# Patient Record
Sex: FEMALE | Race: WHITE | NOT HISPANIC OR LATINO | Employment: FULL TIME | URBAN - METROPOLITAN AREA
[De-identification: names, ages, dates, MRNs, and addresses within clinical notes are randomized per-mention and may not be internally consistent; named-entity substitution may affect disease eponyms.]

---

## 2017-06-26 ENCOUNTER — HOSPITAL ENCOUNTER (EMERGENCY)
Facility: HOSPITAL | Age: 47
Discharge: HOME | End: 2017-06-26
Payer: MEDICAID

## 2017-06-26 VITALS
HEART RATE: 105 BPM | HEIGHT: 63 IN | BODY MASS INDEX: 28.88 KG/M2 | DIASTOLIC BLOOD PRESSURE: 83 MMHG | OXYGEN SATURATION: 97 % | TEMPERATURE: 98 F | SYSTOLIC BLOOD PRESSURE: 166 MMHG | RESPIRATION RATE: 20 BRPM | WEIGHT: 163 LBS

## 2017-06-26 DIAGNOSIS — S20.211A CONTUSION OF RIB ON RIGHT SIDE, INITIAL ENCOUNTER: ICD-10-CM

## 2017-06-26 PROCEDURE — 99283 EMERGENCY DEPT VISIT LOW MDM: CPT

## 2017-06-26 PROCEDURE — 25000003 PHARM REV CODE 250: Performed by: PHYSICIAN ASSISTANT

## 2017-06-26 RX ORDER — IBUPROFEN 600 MG/1
600 TABLET ORAL
Status: COMPLETED | OUTPATIENT
Start: 2017-06-26 | End: 2017-06-26

## 2017-06-26 RX ORDER — ORPHENADRINE CITRATE 100 MG/1
100 TABLET, EXTENDED RELEASE ORAL 2 TIMES DAILY
Qty: 12 TABLET | Refills: 0 | Status: SHIPPED | OUTPATIENT
Start: 2017-06-26 | End: 2018-06-14

## 2017-06-26 RX ORDER — MELOXICAM 7.5 MG/1
7.5 TABLET ORAL DAILY
Qty: 10 TABLET | Refills: 0 | Status: SHIPPED | OUTPATIENT
Start: 2017-06-26 | End: 2018-06-14

## 2017-06-26 RX ADMIN — IBUPROFEN 600 MG: 600 TABLET ORAL at 05:06

## 2017-06-26 NOTE — ED PROVIDER NOTES
"   History      Chief Complaint   Patient presents with    Back Pain     Pt states, "Some boxes fell off af a cart and on to my back at [facility] and it took my breath away and my back is hurting."       Review of patient's allergies indicates:   Allergen Reactions    Morphine      Reports "headache"        HPI   HPI    2017, 5:16 PM   History obtained from the patient      History of Present Illness: Yue Serrato is a 47 y.o. female patient who presents to the Emergency Department for right upper back pain since boxes fell on her at [facility]. Symptoms are constant and moderate in severity.  The patient describes the symptoms as achy.  Denies head injury,  bladder/bowel dysfunction, fever, saddle anesthesia, or focal weakness.  No mitigating or exacerbating factors reported.   No further complaints or concerns at this time.           PCP: MARIYA Altamirano       Past Medical History:  Past Medical History:   Diagnosis Date    Anxiety     Depression     Diabetes mellitus type II     Diverticulitis     Hyperlipidemia     Hypertension          Past Surgical History:  Past Surgical History:   Procedure Laterality Date     SECTION      three    HYSTERECTOMY      2005    LIPOMA RESECTION             Family History:  Family History   Problem Relation Age of Onset    Heart disease Mother      CAD    Diabetes Mother     Hypertension Mother     Hyperlipidemia Mother     COPD Mother     COPD Father     Diabetes Father     Hyperlipidemia Father     Hypertension Father     Heart disease Father      MI           Social History:  Social History     Social History Main Topics    Smoking status: Never Smoker    Smokeless tobacco: Never Used    Alcohol use No    Drug use: No    Sexual activity: Not Currently     Partners: Male       ROS   Review of Systems   Constitutional: Negative for chills and fever.   HENT: Negative for sore throat.    Respiratory: Negative for shortness of breath.  "   Cardiovascular: Negative for chest pain.   Gastrointestinal: Negative for nausea and vomiting.   Genitourinary: Negative for decreased urine volume, difficulty urinating, dysuria and flank pain.   Musculoskeletal: Positive for upper back pain/rib pain. Negative for neck stiffness.   Skin: Negative for rash and wound.   Neurological: Negative for weakness and numbness.   Hematological: Does not bruise/bleed easily.   All other systems reviewed and are negative.    Review of Systems    Physical Exam      Initial Vitals [06/26/17 1653]   BP Pulse Resp Temp SpO2   (!) 166/83 105 20 98.1 °F (36.7 °C) 97 %      MAP       110.67         Physical Exam  Vital signs and nursing notes reviewed.  Constitutional: Patient is in NAD. Awake and alert. Well-developed and well-nourished.  Head: Atraumatic. Normocephalic.  Eyes: PERRL. EOM intact. Conjunctivae nl. No scleral icterus.  ENT: Mucous membranes are moist. Oropharynx is clear.  Neck: Supple. No JVD. No lymphadenopathy.  No meningismus.  Nontender  Cardiovascular: Regular rate and rhythm. No murmurs, rubs, or gallops. Distal pulses are 2+ and symmetric.  Pulmonary/Chest: No respiratory distress. Clear to auscultation bilaterally. No wheezing, rales, or rhonchi.  Abdominal: Soft. Non-distended. No TTP. No rebound, guarding, or rigidity. Good bowel sounds.  Genitourinary: No CVA tenderness  Musculoskeletal: Moves all extremities. No edema.   Non tender c/t/l/s spine.  Right upper back/ribs tender, no erythema, edema, ecchymosis, step or crep.  Skin: Warm and dry.  Neurological: Awake and alert. No acute focal neurological deficits are appreciated.  5/5 x 4 strength.  Strong and equal plantar and dorsiflexion.  Psychiatric: Normal affect. Good eye contact. Appropriate in content.      ED Course      Procedures  ED Vital Signs:  Vitals:    06/26/17 1653   BP: (!) 166/83   Pulse: 105   Resp: 20   Temp: 98.1 °F (36.7 °C)   TempSrc: Oral   SpO2: 97%   Weight: 73.9 kg (163 lb)  "  Height: 5' 2.5" (1.588 m)                 Imaging Results:  Imaging Results          X-Ray Ribs 2 View Right (Final result)  Result time 06/26/17 18:00:05    Final result by Cristina Garcia III, MD (06/26/17 18:00:05)                 Impression:     No evidence of rib fracture or other acute intrathoracic disease.      Electronically signed by: CRISTINA GARCIA MD  Date:     06/26/17  Time:    18:00              Narrative:    XR RIBS 2 VIEW RIGHT    Clinical history: S20.211A Contusion of right front wall of thorax, initial encounter    Findings: No rib fracture identified. No evidence of pneumothorax, effusion or acute infiltrate. Cardiomediastinal silhouette is within normal limits.                                 The Emergency Provider reviewed the vital signs and test results, which are outlined above.    ED Discussion         Medication(s) given in the ER:  Medications   ibuprofen tablet 600 mg (600 mg Oral Given 6/26/17 1740)           Follow-up Information     MARIYA Altamirano In 2 days.    Specialty:  Family Medicine  Contact information:  [address] [place] [address]  [address] [place] [id number] [phone]                       New Prescriptions    MELOXICAM (MOBIC) 7.5 MG TABLET    Take 1 tablet (7.5 mg total) by mouth once daily.    ORPHENADRINE (NORFLEX) 100 MG TABLET    Take 1 tablet (100 mg total) by mouth 2 (two) times daily.          Medical Decision Making        All findings were reviewed with the patient/family in detail.   All remaining questions and concerns were addressed at that time.  Patient/family has been counseled regarding the need for follow-up as well as the indication to return to the emergency room should new or worrisome developments occur.          MDM               Clinical Impression:        ICD-10-CM ICD-9-CM   1. Contusion of rib on right side, initial encounter S20.211A 922.1             Sybil Elias PA-C  06/26/17 1812    "

## 2017-09-16 ENCOUNTER — HOSPITAL ENCOUNTER (EMERGENCY)
Facility: HOSPITAL | Age: 47
Discharge: HOME | End: 2017-09-16
Attending: EMERGENCY MEDICINE
Payer: MEDICAID

## 2017-09-16 VITALS
HEIGHT: 62 IN | WEIGHT: 162 LBS | OXYGEN SATURATION: 100 % | DIASTOLIC BLOOD PRESSURE: 74 MMHG | RESPIRATION RATE: 16 BRPM | SYSTOLIC BLOOD PRESSURE: 139 MMHG | BODY MASS INDEX: 29.81 KG/M2 | TEMPERATURE: 98 F | HEART RATE: 79 BPM

## 2017-09-16 DIAGNOSIS — R73.9 HYPERGLYCEMIA: Primary | ICD-10-CM

## 2017-09-16 DIAGNOSIS — N39.0 URINARY TRACT INFECTION WITHOUT HEMATURIA, SITE UNSPECIFIED: ICD-10-CM

## 2017-09-16 DIAGNOSIS — E10.65 HYPERGLYCEMIA DUE TO TYPE 1 DIABETES MELLITUS: ICD-10-CM

## 2017-09-16 DIAGNOSIS — R53.1 WEAKNESS: ICD-10-CM

## 2017-09-16 LAB
ALBUMIN SERPL BCP-MCNC: 3.6 G/DL
ALLENS TEST: ABNORMAL
ALP SERPL-CCNC: 106 U/L
ALT SERPL W/O P-5'-P-CCNC: 21 U/L
ANION GAP SERPL CALC-SCNC: 13 MMOL/L
AST SERPL-CCNC: 14 U/L
B-OH-BUTYR BLD STRIP-SCNC: 0 MMOL/L
BACTERIA #/AREA URNS HPF: ABNORMAL /HPF
BASOPHILS # BLD AUTO: 0.02 K/UL
BASOPHILS NFR BLD: 0.2 %
BILIRUB SERPL-MCNC: 0.6 MG/DL
BILIRUB UR QL STRIP: NEGATIVE
BUN SERPL-MCNC: 17 MG/DL
CALCIUM SERPL-MCNC: 9.5 MG/DL
CHLORIDE SERPL-SCNC: 103 MMOL/L
CLARITY UR: CLEAR
CO2 SERPL-SCNC: 20 MMOL/L
COLOR UR: YELLOW
CREAT SERPL-MCNC: 1 MG/DL
DELSYS: ABNORMAL
DIFFERENTIAL METHOD: NORMAL
EOSINOPHIL # BLD AUTO: 0.1 K/UL
EOSINOPHIL NFR BLD: 1.3 %
ERYTHROCYTE [DISTWIDTH] IN BLOOD BY AUTOMATED COUNT: 12.5 %
EST. GFR  (AFRICAN AMERICAN): >60 ML/MIN/1.73 M^2
EST. GFR  (NON AFRICAN AMERICAN): >60 ML/MIN/1.73 M^2
GLUCOSE SERPL-MCNC: 428 MG/DL
GLUCOSE UR QL STRIP: ABNORMAL
HCO3 UR-SCNC: 21.8 MMOL/L (ref 24–28)
HCT VFR BLD AUTO: 39 %
HGB BLD-MCNC: 13.8 G/DL
HGB UR QL STRIP: ABNORMAL
KETONES UR QL STRIP: NEGATIVE
LEUKOCYTE ESTERASE UR QL STRIP: NEGATIVE
LIPASE SERPL-CCNC: 76 U/L
LYMPHOCYTES # BLD AUTO: 2.6 K/UL
LYMPHOCYTES NFR BLD: 27.6 %
MCH RBC QN AUTO: 30.4 PG
MCHC RBC AUTO-ENTMCNC: 35.4 G/DL
MCV RBC AUTO: 86 FL
MICROSCOPIC COMMENT: ABNORMAL
MODE: ABNORMAL
MONOCYTES # BLD AUTO: 0.5 K/UL
MONOCYTES NFR BLD: 5.2 %
NEUTROPHILS # BLD AUTO: 6.1 K/UL
NEUTROPHILS NFR BLD: 65.7 %
NITRITE UR QL STRIP: NEGATIVE
PCO2 BLDA: 34.9 MMHG (ref 35–45)
PH SMN: 7.4 [PH] (ref 7.35–7.45)
PH UR STRIP: 5 [PH] (ref 5–8)
PLATELET # BLD AUTO: 211 K/UL
PMV BLD AUTO: 9.9 FL
PO2 BLDA: 111 MMHG (ref 80–100)
POC BE: -3 MMOL/L
POC SATURATED O2: 98 % (ref 95–100)
POCT GLUCOSE: 247 MG/DL (ref 70–110)
POCT GLUCOSE: 389 MG/DL (ref 70–110)
POTASSIUM SERPL-SCNC: 4 MMOL/L
PROT SERPL-MCNC: 7.5 G/DL
PROT UR QL STRIP: NEGATIVE
RBC # BLD AUTO: 4.54 M/UL
RBC #/AREA URNS HPF: 5 /HPF (ref 0–4)
SAMPLE: ABNORMAL
SITE: ABNORMAL
SODIUM SERPL-SCNC: 136 MMOL/L
SP GR UR STRIP: 1.01 (ref 1–1.03)
TROPONIN I SERPL DL<=0.01 NG/ML-MCNC: 0.01 NG/ML
URN SPEC COLLECT METH UR: ABNORMAL
UROBILINOGEN UR STRIP-ACNC: NEGATIVE EU/DL
WBC # BLD AUTO: 9.24 K/UL
WBC #/AREA URNS HPF: 30 /HPF (ref 0–5)
YEAST URNS QL MICRO: ABNORMAL

## 2017-09-16 PROCEDURE — 99284 EMERGENCY DEPT VISIT MOD MDM: CPT | Mod: 25

## 2017-09-16 PROCEDURE — 99900035 HC TECH TIME PER 15 MIN (STAT)

## 2017-09-16 PROCEDURE — 80053 COMPREHEN METABOLIC PANEL: CPT

## 2017-09-16 PROCEDURE — 83690 ASSAY OF LIPASE: CPT

## 2017-09-16 PROCEDURE — 63600175 PHARM REV CODE 636 W HCPCS: Performed by: EMERGENCY MEDICINE

## 2017-09-16 PROCEDURE — 81000 URINALYSIS NONAUTO W/SCOPE: CPT

## 2017-09-16 PROCEDURE — 36600 WITHDRAWAL OF ARTERIAL BLOOD: CPT

## 2017-09-16 PROCEDURE — 87077 CULTURE AEROBIC IDENTIFY: CPT

## 2017-09-16 PROCEDURE — 82962 GLUCOSE BLOOD TEST: CPT

## 2017-09-16 PROCEDURE — 84484 ASSAY OF TROPONIN QUANT: CPT

## 2017-09-16 PROCEDURE — 82010 KETONE BODYS QUAN: CPT

## 2017-09-16 PROCEDURE — 25000003 PHARM REV CODE 250: Performed by: EMERGENCY MEDICINE

## 2017-09-16 PROCEDURE — 87186 SC STD MICRODIL/AGAR DIL: CPT

## 2017-09-16 PROCEDURE — 96361 HYDRATE IV INFUSION ADD-ON: CPT

## 2017-09-16 PROCEDURE — 93005 ELECTROCARDIOGRAM TRACING: CPT

## 2017-09-16 PROCEDURE — 93010 ELECTROCARDIOGRAM REPORT: CPT | Mod: ,,, | Performed by: INTERNAL MEDICINE

## 2017-09-16 PROCEDURE — 96365 THER/PROPH/DIAG IV INF INIT: CPT

## 2017-09-16 PROCEDURE — 87088 URINE BACTERIA CULTURE: CPT

## 2017-09-16 PROCEDURE — 87086 URINE CULTURE/COLONY COUNT: CPT

## 2017-09-16 PROCEDURE — 85025 COMPLETE CBC W/AUTO DIFF WBC: CPT

## 2017-09-16 RX ORDER — ATORVASTATIN CALCIUM 40 MG/1
40 TABLET, FILM COATED ORAL DAILY
COMMUNITY
End: 2018-06-14

## 2017-09-16 RX ORDER — SODIUM CHLORIDE 9 MG/ML
1000 INJECTION, SOLUTION INTRAVENOUS
Status: COMPLETED | OUTPATIENT
Start: 2017-09-16 | End: 2017-09-16

## 2017-09-16 RX ORDER — ESCITALOPRAM OXALATE 10 MG/1
10 TABLET ORAL DAILY
COMMUNITY
End: 2018-06-14

## 2017-09-16 RX ORDER — ACETAMINOPHEN 500 MG
1000 TABLET ORAL
Status: COMPLETED | OUTPATIENT
Start: 2017-09-16 | End: 2017-09-16

## 2017-09-16 RX ORDER — CIPROFLOXACIN 500 MG/1
500 TABLET ORAL 2 TIMES DAILY
Qty: 14 TABLET | Refills: 0 | Status: SHIPPED | OUTPATIENT
Start: 2017-09-16 | End: 2017-09-23

## 2017-09-16 RX ADMIN — ACETAMINOPHEN 1000 MG: 500 TABLET ORAL at 07:09

## 2017-09-16 RX ADMIN — CEFTRIAXONE 1 G: 1 INJECTION, SOLUTION INTRAVENOUS at 07:09

## 2017-09-16 RX ADMIN — SODIUM CHLORIDE 1000 ML: 0.9 INJECTION, SOLUTION INTRAVENOUS at 07:09

## 2017-09-16 NOTE — ED PROVIDER NOTES
"SCRIBE #1 NOTE: I, Shi Jarvis, am scribing for, and in the presence of, Josesito Jarvis Jr., MD. I have scribed the entire note.      History      Chief Complaint   Patient presents with    Hyperglycemia     Pt stated her Blood sugar high.599 at home       Review of patient's allergies indicates:   Allergen Reactions    Morphine      Reports "headache"        HPI   HPI    2017, 6:45 PM   History obtained from the patient      History of Present Illness: Yue Serrato is a 47 y.o. female patient with DM Type II, HTN, who presents to the Emergency Department for hyperglycemia which onset gradually PTA. Symptoms are constant and moderate in severity. She states that her blood glucose level was 599. She states that she then "took her shots" and rechecked her level but it did not register on the meter. No mitigating or exacerbating factors reported. She c/o having abd pain and BLE myalgias. Patient denies fever, chills, N/V, dizziness, CP, SOB, light-headedness, and all other sxs at this time. No further complaints or concerns at this time.       Arrival mode: Personal vehicle    PCP: MARIYA Altamirano       Past Medical History:  Past Medical History:   Diagnosis Date    Anxiety     Depression     Diabetes mellitus type II     Diverticulitis     Hyperlipidemia     Hypertension        Past Surgical History:  Past Surgical History:   Procedure Laterality Date     SECTION      three    HYSTERECTOMY      2005    LIPOMA RESECTION           Family History:  Family History   Problem Relation Age of Onset    Heart disease Mother      CAD    Diabetes Mother     Hypertension Mother     Hyperlipidemia Mother     COPD Mother     COPD Father     Diabetes Father     Hyperlipidemia Father     Hypertension Father     Heart disease Father      MI       Social History:  Social History     Social History Main Topics    Smoking status: Never Smoker    Smokeless tobacco: Never Used    Alcohol use " No    Drug use: No    Sexual activity: Not Currently     Partners: Male       ROS   Review of Systems   Constitutional: Negative for chills and fever.        (+) hyperglycemia   HENT: Negative for sore throat.    Respiratory: Negative for shortness of breath.    Cardiovascular: Negative for chest pain.   Gastrointestinal: Positive for abdominal pain. Negative for constipation, diarrhea, nausea and vomiting.   Genitourinary: Negative for dysuria.   Musculoskeletal: Positive for myalgias (LE). Negative for back pain.   Skin: Negative for rash.   Neurological: Negative for dizziness, syncope, weakness, light-headedness, numbness and headaches.   Hematological: Does not bruise/bleed easily.   All other systems reviewed and are negative.      Physical Exam      Initial Vitals [09/16/17 1842]   BP Pulse Resp Temp SpO2   (!) 145/75 94 18 98.2 °F (36.8 °C) 98 %      MAP       98.33          Physical Exam  Nursing Notes and Vital Signs Reviewed.  Constitutional: Patient is in no acute distress. Awake and alert. Well-developed and well-nourished.  Head: Atraumatic. Normocephalic.  Eyes: PERRL. EOM intact. Conjunctivae are not pale. No scleral icterus.  ENT: Mucous membranes are dry. Oropharynx is clear and symmetric.    Neck: Supple. Full ROM. No lymphadenopathy.  Cardiovascular: Tachycardic. Regular rhythm. No murmurs, rubs, or gallops. Distal pulses are 2+ and symmetric.  Pulmonary/Chest: No respiratory distress. Clear to auscultation bilaterally. No wheezing, rales, or rhonchi.  Abdominal: Soft and non-distended.  There is no tenderness.  No rebound, guarding, or rigidity.  Good bowel sounds.    : No CVA tenderness.  Musculoskeletal: Moves all extremities. No obvious deformities. No edema. No calf tenderness.  Skin: Warm and dry.  Neurological:  Alert, awake, and appropriate.  Normal speech.  No acute focal neurological deficits are appreciated.  Psychiatric: Normal affect. Good eye contact. Appropriate in  "content.    ED Course    Procedures  ED Vital Signs:  Vitals:    09/16/17 1842 09/16/17 1901 09/16/17 1924 09/16/17 2001   BP: (!) 145/75   138/76   Pulse: 94 89 90 85   Resp: 18  20    Temp: 98.2 °F (36.8 °C)      TempSrc: Oral      SpO2: 98%  97% 100%   Weight: 73.5 kg (162 lb)      Height: 5' 2" (1.575 m)          Abnormal Lab Results:  Labs Reviewed   COMPREHENSIVE METABOLIC PANEL - Abnormal; Notable for the following:        Result Value    CO2 20 (*)     Glucose 428 (*)     All other components within normal limits   LIPASE - Abnormal; Notable for the following:     Lipase 76 (*)     All other components within normal limits   URINALYSIS - Abnormal; Notable for the following:     Glucose, UA 3+ (*)     Occult Blood UA Trace (*)     All other components within normal limits   URINALYSIS MICROSCOPIC - Abnormal; Notable for the following:     RBC, UA 5 (*)     WBC, UA 30 (*)     Bacteria, UA Few (*)     All other components within normal limits   POCT GLUCOSE - Abnormal; Notable for the following:     POCT Glucose 389 (*)     All other components within normal limits   ISTAT PROCEDURE - Abnormal; Notable for the following:     POC PCO2 34.9 (*)     POC PO2 111 (*)     POC HCO3 21.8 (*)     All other components within normal limits   POCT GLUCOSE - Abnormal; Notable for the following:     POCT Glucose 247 (*)     All other components within normal limits   CULTURE, URINE   CULTURE, URINE   CBC W/ AUTO DIFFERENTIAL   TROPONIN I   BETA - HYDROXYBUTYRATE, SERUM   POCT GLUCOSE MONITORING CONTINUOUS        All Lab Results:  Results for orders placed or performed during the hospital encounter of 09/16/17   CBC auto differential   Result Value Ref Range    WBC 9.24 3.90 - 12.70 K/uL    RBC 4.54 4.00 - 5.40 M/uL    Hemoglobin 13.8 12.0 - 16.0 g/dL    Hematocrit 39.0 37.0 - 48.5 %    MCV 86 82 - 98 fL    MCH 30.4 27.0 - 31.0 pg    MCHC 35.4 32.0 - 36.0 g/dL    RDW 12.5 11.5 - 14.5 %    Platelets 211 150 - 350 K/uL    MPV " 9.9 9.2 - 12.9 fL    Gran # 6.1 1.8 - 7.7 K/uL    Lymph # 2.6 1.0 - 4.8 K/uL    Mono # 0.5 0.3 - 1.0 K/uL    Eos # 0.1 0.0 - 0.5 K/uL    Baso # 0.02 0.00 - 0.20 K/uL    Gran% 65.7 38.0 - 73.0 %    Lymph% 27.6 18.0 - 48.0 %    Mono% 5.2 4.0 - 15.0 %    Eosinophil% 1.3 0.0 - 8.0 %    Basophil% 0.2 0.0 - 1.9 %    Differential Method Automated    Comprehensive metabolic panel   Result Value Ref Range    Sodium 136 136 - 145 mmol/L    Potassium 4.0 3.5 - 5.1 mmol/L    Chloride 103 95 - 110 mmol/L    CO2 20 (L) 23 - 29 mmol/L    Glucose 428 (H) 70 - 110 mg/dL    BUN, Bld 17 6 - 20 mg/dL    Creatinine 1.0 0.5 - 1.4 mg/dL    Calcium 9.5 8.7 - 10.5 mg/dL    Total Protein 7.5 6.0 - 8.4 g/dL    Albumin 3.6 3.5 - 5.2 g/dL    Total Bilirubin 0.6 0.1 - 1.0 mg/dL    Alkaline Phosphatase 106 55 - 135 U/L    AST 14 10 - 40 U/L    ALT 21 10 - 44 U/L    Anion Gap 13 8 - 16 mmol/L    eGFR if African American >60 >60 mL/min/1.73 m^2    eGFR if non African American >60 >60 mL/min/1.73 m^2   Lipase   Result Value Ref Range    Lipase 76 (H) 4 - 60 U/L   Urinalysis   Result Value Ref Range    Specimen UA Urine, Clean Catch     Color, UA Yellow Yellow, Straw, Naa    Appearance, UA Clear Clear    pH, UA 5.0 5.0 - 8.0    Specific Gravity, UA 1.015 1.005 - 1.030    Protein, UA Negative Negative    Glucose, UA 3+ (A) Negative    Ketones, UA Negative Negative    Bilirubin (UA) Negative Negative    Occult Blood UA Trace (A) Negative    Nitrite, UA Negative Negative    Urobilinogen, UA Negative <2.0 EU/dL    Leukocytes, UA Negative Negative   Troponin I   Result Value Ref Range    Troponin I 0.008 0.000 - 0.026 ng/mL   Beta - Hydroxybutyrate, Serum   Result Value Ref Range    Beta-Hydroxybutyrate 0.0 0.0 - 0.5 mmol/L   Urinalysis Microscopic   Result Value Ref Range    RBC, UA 5 (H) 0 - 4 /hpf    WBC, UA 30 (H) 0 - 5 /hpf    Bacteria, UA Few (A) None-Occ /hpf    Yeast, UA None None    Microscopic Comment SEE COMMENT    POCT glucose   Result Value  Ref Range    POCT Glucose 389 (H) 70 - 110 mg/dL   ISTAT PROCEDURE   Result Value Ref Range    POC PH 7.404 7.35 - 7.45    POC PCO2 34.9 (L) 35 - 45 mmHg    POC PO2 111 (H) 80 - 100 mmHg    POC HCO3 21.8 (L) 24 - 28 mmol/L    POC BE -3 -2 to 2 mmol/L    POC SATURATED O2 98 95 - 100 %    Sample ARTERIAL     Site RR     Allens Test Pass     DelSys Room Air     Mode SPONT    POCT glucose   Result Value Ref Range    POCT Glucose 247 (H) 70 - 110 mg/dL       Imaging Results:  Imaging Results          X-Ray Chest AP Portable (Final result)  Result time 09/16/17 19:28:58    Final result by Ninoska Bueno MD (Timothy) (09/16/17 19:28:58)                 Impression:     Normal sized heart.Clear lungs. No change compared to 01/08/2016.      Electronically signed by: NINOSKA BUENO MD  Date:     09/16/17  Time:    19:28              Narrative:    Chest, 1 view.    Clinical History: Weakness                             The EKG was ordered, reviewed, and independently interpreted by the ED provider.  Interpretation time: 19:01  Rate: 89 BPM  Rhythm: normal sinus rhythm  Interpretation: Possible left atrial enlargement. Anteroseptal infarct. No STEMI.    The Emergency Provider reviewed the vital signs and test results, which are outlined above.    ED Discussion     8:40 PM: Reassessed pt at this time.  Pt's condition has improved at this time.  Her blood glucose level is 247. Discussed with pt all pertinent ED information and results. Discussed pt dx and plan of tx. Gave pt all f/u and return to the ED instructions. All questions and concerns were addressed at this time. Pt expresses understanding of information and instructions, and is comfortable with plan to discharge. Pt is stable for discharge.    I discussed with patient and/or family/caretaker that evaluation in the ED does not suggest any emergent or life threatening medical conditions requiring immediate intervention beyond what was provided in the ED, and I believe  patient is safe for discharge.  Regardless, an unremarkable evaluation in the ED does not preclude the development or presence of a serious of life threatening condition. As such, patient was instructed to return immediately for any worsening or change in current symptoms.    ED Medication(s):  Medications   0.9%  NaCl infusion (0 mLs Intravenous Stopped 9/16/17 2019)   0.9%  NaCl infusion (0 mLs Intravenous Stopped 9/16/17 1949)   cefTRIAXone (ROCEPHIN) 1 g in dextrose 5 % 50 mL IVPB (0 g Intravenous Stopped 9/16/17 2017)   acetaminophen tablet 1,000 mg (1,000 mg Oral Given 9/16/17 1953)       New Prescriptions    CIPROFLOXACIN HCL (CIPRO) 500 MG TABLET    Take 1 tablet (500 mg total) by mouth 2 (two) times daily.       Follow-up Information     MARIYA Altamirano. Call in 2 days.    Specialty:  Family Medicine  Why:  to schedule appt for recheck  Contact information:  [address] [address]  [address] [place] [id number] [phone]                    Medical Decision Making    Medical Decision Making:   Clinical Tests:   Lab Tests: Reviewed and Ordered  Radiological Study: Reviewed and Ordered  Medical Tests: Ordered and Reviewed           Scribe Attestation:   Scribe #1: I performed the above scribed service and the documentation accurately describes the services I performed. I attest to the accuracy of the note.    Attending:   Physician Attestation Statement for Scribe #1: I, Josesito Jarvis Jr., MD, personally performed the services described in this documentation, as scribed by Shi Jarvis, in my presence, and it is both accurate and complete.          Clinical Impression       ICD-10-CM ICD-9-CM   1. Hyperglycemia R73.9 790.29   2. Hyperglycemia due to type 1 diabetes mellitus E10.65 250.01   3. Weakness R53.1 780.79   4. Urinary tract infection without hematuria, site unspecified N39.0 599.0       Disposition:   Disposition: Discharged  Condition: Stable         Josesito Jarvis Jr., MD  09/16/17 2282

## 2017-09-19 LAB — BACTERIA UR CULT: NORMAL

## 2017-09-20 ENCOUNTER — TELEPHONE (OUTPATIENT)
Dept: EMERGENCY MEDICINE | Facility: HOSPITAL | Age: 47
End: 2017-09-20

## 2017-09-20 NOTE — TELEPHONE ENCOUNTER
Spoke to patient about her urine culture sensitivity. Prescription for Bactrim DS 1 PO BIDx 7days has been called into the [facility] in [place].

## 2017-09-20 NOTE — TELEPHONE ENCOUNTER
----- Message from Carl Johnson MD sent at 9/20/2017 11:14 AM CDT -----  Patient with UTI sensitive to Bactrim.  Please call in Bactrim DS 1 po BID for 7 days.

## 2018-06-14 ENCOUNTER — HOSPITAL ENCOUNTER (EMERGENCY)
Facility: HOSPITAL | Age: 48
Discharge: HOME | End: 2018-06-14
Attending: EMERGENCY MEDICINE
Payer: COMMERCIAL

## 2018-06-14 VITALS
RESPIRATION RATE: 18 BRPM | HEART RATE: 74 BPM | HEIGHT: 62 IN | BODY MASS INDEX: 31.28 KG/M2 | TEMPERATURE: 98 F | SYSTOLIC BLOOD PRESSURE: 179 MMHG | WEIGHT: 170 LBS | DIASTOLIC BLOOD PRESSURE: 87 MMHG | OXYGEN SATURATION: 100 %

## 2018-06-14 DIAGNOSIS — R06.00 DYSPNEA, UNSPECIFIED TYPE: Primary | ICD-10-CM

## 2018-06-14 DIAGNOSIS — R06.02 SHORTNESS OF BREATH: ICD-10-CM

## 2018-06-14 DIAGNOSIS — R00.2 PALPITATIONS: ICD-10-CM

## 2018-06-14 LAB
ALBUMIN SERPL BCP-MCNC: 3.8 G/DL
ALP SERPL-CCNC: 114 U/L
ALT SERPL W/O P-5'-P-CCNC: 20 U/L
ANION GAP SERPL CALC-SCNC: 14 MMOL/L
APTT BLDCRRT: 25.7 SEC
AST SERPL-CCNC: 16 U/L
BASOPHILS # BLD AUTO: 0.02 K/UL
BASOPHILS NFR BLD: 0.3 %
BILIRUB SERPL-MCNC: 0.9 MG/DL
BNP SERPL-MCNC: 73 PG/ML
BUN SERPL-MCNC: 11 MG/DL
CALCIUM SERPL-MCNC: 9.8 MG/DL
CHLORIDE SERPL-SCNC: 105 MMOL/L
CO2 SERPL-SCNC: 20 MMOL/L
CREAT SERPL-MCNC: 0.8 MG/DL
D DIMER PPP IA.FEU-MCNC: <0.19 MG/L FEU
DIFFERENTIAL METHOD: NORMAL
EOSINOPHIL # BLD AUTO: 0.2 K/UL
EOSINOPHIL NFR BLD: 2.2 %
ERYTHROCYTE [DISTWIDTH] IN BLOOD BY AUTOMATED COUNT: 12.7 %
EST. GFR  (AFRICAN AMERICAN): >60 ML/MIN/1.73 M^2
EST. GFR  (NON AFRICAN AMERICAN): >60 ML/MIN/1.73 M^2
GLUCOSE SERPL-MCNC: 271 MG/DL
HCT VFR BLD AUTO: 41.5 %
HGB BLD-MCNC: 14.4 G/DL
INR PPP: 0.9
LYMPHOCYTES # BLD AUTO: 2.3 K/UL
LYMPHOCYTES NFR BLD: 33.8 %
MCH RBC QN AUTO: 29.1 PG
MCHC RBC AUTO-ENTMCNC: 34.7 G/DL
MCV RBC AUTO: 84 FL
MONOCYTES # BLD AUTO: 0.4 K/UL
MONOCYTES NFR BLD: 6.1 %
NEUTROPHILS # BLD AUTO: 4 K/UL
NEUTROPHILS NFR BLD: 57.6 %
PLATELET # BLD AUTO: 207 K/UL
PMV BLD AUTO: 9.9 FL
POCT GLUCOSE: 248 MG/DL (ref 70–110)
POTASSIUM SERPL-SCNC: 4.2 MMOL/L
PROT SERPL-MCNC: 7.8 G/DL
PROTHROMBIN TIME: 9.6 SEC
RBC # BLD AUTO: 4.94 M/UL
SODIUM SERPL-SCNC: 139 MMOL/L
TROPONIN I SERPL DL<=0.01 NG/ML-MCNC: <0.006 NG/ML
WBC # BLD AUTO: 6.89 K/UL

## 2018-06-14 PROCEDURE — 93010 ELECTROCARDIOGRAM REPORT: CPT | Mod: ,,, | Performed by: INTERNAL MEDICINE

## 2018-06-14 PROCEDURE — 80053 COMPREHEN METABOLIC PANEL: CPT

## 2018-06-14 PROCEDURE — 85610 PROTHROMBIN TIME: CPT

## 2018-06-14 PROCEDURE — 25000003 PHARM REV CODE 250: Performed by: EMERGENCY MEDICINE

## 2018-06-14 PROCEDURE — 85379 FIBRIN DEGRADATION QUANT: CPT

## 2018-06-14 PROCEDURE — 85025 COMPLETE CBC W/AUTO DIFF WBC: CPT

## 2018-06-14 PROCEDURE — 83880 ASSAY OF NATRIURETIC PEPTIDE: CPT

## 2018-06-14 PROCEDURE — 84484 ASSAY OF TROPONIN QUANT: CPT

## 2018-06-14 PROCEDURE — 82962 GLUCOSE BLOOD TEST: CPT

## 2018-06-14 PROCEDURE — 99284 EMERGENCY DEPT VISIT MOD MDM: CPT | Mod: 25

## 2018-06-14 PROCEDURE — 93005 ELECTROCARDIOGRAM TRACING: CPT

## 2018-06-14 PROCEDURE — 85730 THROMBOPLASTIN TIME PARTIAL: CPT

## 2018-06-14 RX ORDER — ACETAMINOPHEN 325 MG/1
650 TABLET ORAL
Status: COMPLETED | OUTPATIENT
Start: 2018-06-14 | End: 2018-06-14

## 2018-06-14 RX ADMIN — ACETAMINOPHEN 650 MG: 325 TABLET, FILM COATED ORAL at 07:06

## 2018-06-14 NOTE — ED PROVIDER NOTES
"SCRIBE #1 NOTE: I, Matheus Lucero, am scribing for, and in the presence of, Noris De Paz MD. I have scribed the entire note.      History      Chief Complaint   Patient presents with    Shortness of Breath     feels a "fluttering in chest"       Review of patient's allergies indicates:   Allergen Reactions    Morphine      Reports "headache"        HPI   HPI    2018, 6:28 AM   History obtained from the patient      History of Present Illness: Yue Serrato is a 48 y.o. female patient w/ PMhx of Anxiety, DM, and HTN presents to the Emergency Department for SOB which onset gradually last PM, worsening this AM. Symptoms are intermittent and moderate in severity.  No mitigating or exacerbating factors reported. Associated sxs include palpitations, described as "flutering/ racing." Patient denies any chest pain, diaphoresis, cough, n/v/d, focal weakness/ numbness, leg swelling/ pain, and all other sxs at this time. Pt reports that her PCP recently discontinued her BP medication. Pt reports that she has an appointment w/ her cardiologist in July. No further complaints or concerns at this time.       Arrival mode: Personal vehicle     PCP: MARIYA Altamirano       Past Medical History:  Past Medical History:   Diagnosis Date    Anxiety     Depression     Diabetes mellitus type II     Diverticulitis     Hyperlipidemia     Hypertension        Past Surgical History:  Past Surgical History:   Procedure Laterality Date     SECTION      three    HYSTERECTOMY      2005    LIPOMA RESECTION           Family History:  Family History   Problem Relation Age of Onset    Heart disease Mother         CAD    Diabetes Mother     Hypertension Mother     Hyperlipidemia Mother     COPD Mother     COPD Father     Diabetes Father     Hyperlipidemia Father     Hypertension Father     Heart disease Father         MI       Social History:  Social History     Social History Main Topics    Smoking " status: Never Smoker    Smokeless tobacco: Never Used    Alcohol use No    Drug use: No    Sexual activity: Not Currently     Partners: Male       ROS   Review of Systems   Constitutional: Negative for chills, diaphoresis and fever.   Respiratory: Positive for shortness of breath. Negative for cough.    Cardiovascular: Positive for palpitations. Negative for chest pain and leg swelling.   Gastrointestinal: Negative for abdominal pain, diarrhea, nausea and vomiting.   Genitourinary: Negative for difficulty urinating and urgency.   Musculoskeletal:        - leg pain   Neurological: Negative for dizziness, syncope, weakness, light-headedness, numbness and headaches.   All other systems reviewed and are negative.      Physical Exam      Initial Vitals [06/14/18 0614]   BP Pulse Resp Temp SpO2   (!) 172/98 78 18 97.8 °F (36.6 °C) 98 %      MAP       --          Physical Exam  Nursing Notes and Vital Signs Reviewed.  Constitutional: Patient is in no apparent distress. Well-developed and well-nourished.  Head: Atraumatic. Normocephalic.  Eyes: PERRL. EOM intact. Conjunctivae are not pale. No scleral icterus.  ENT: Mucous membranes are moist. Oropharynx is clear and symmetric.    Neck: Supple. Full ROM. No lymphadenopathy.  Cardiovascular: Regular rate. Regular rhythm. No murmurs, rubs, or gallops. Distal pulses are 2+ and symmetric.  Pulmonary/Chest: No respiratory distress. Clear to auscultation bilaterally. No wheezing or rales.  Abdominal: Soft and non-distended.  There is no tenderness.  No rebound, guarding, or rigidity. Good bowel sounds.  Musculoskeletal: Moves all extremities. No obvious deformities. No edema. No calf tenderness.  Skin: Warm and dry.  Neurological:  Alert, awake, and appropriate.  Normal speech.  No acute focal neurological deficits are appreciated.  Psychiatric: Normal affect. Good eye contact. Appropriate in content.    ED Course    Procedures  ED Vital Signs:  Vitals:    06/14/18 0614  "06/14/18 0654 06/14/18 0702 06/14/18 0703   BP: (!) 172/98   (!) 182/83   Pulse: 78 78 78 79   Resp: 18   20   Temp: 97.8 °F (36.6 °C)      TempSrc: Oral      SpO2: 98%   99%   Weight: 77.1 kg (170 lb)      Height: 5' 2" (1.575 m)       06/14/18 0732   BP: (!) 143/77   Pulse: 80   Resp: (!) 21   Temp:    TempSrc:    SpO2: 100%   Weight:    Height:        Abnormal Lab Results:  Labs Reviewed   COMPREHENSIVE METABOLIC PANEL - Abnormal; Notable for the following:        Result Value    CO2 20 (*)     Glucose 271 (*)     All other components within normal limits   POCT GLUCOSE - Abnormal; Notable for the following:     POCT Glucose 248 (*)     All other components within normal limits   CBC W/ AUTO DIFFERENTIAL   TROPONIN I   B-TYPE NATRIURETIC PEPTIDE   PROTIME-INR   APTT   D DIMER, QUANTITATIVE        All Lab Results:  Results for orders placed or performed during the hospital encounter of 06/14/18   CBC auto differential   Result Value Ref Range    WBC 6.89 3.90 - 12.70 K/uL    RBC 4.94 4.00 - 5.40 M/uL    Hemoglobin 14.4 12.0 - 16.0 g/dL    Hematocrit 41.5 37.0 - 48.5 %    MCV 84 82 - 98 fL    MCH 29.1 27.0 - 31.0 pg    MCHC 34.7 32.0 - 36.0 g/dL    RDW 12.7 11.5 - 14.5 %    Platelets 207 150 - 350 K/uL    MPV 9.9 9.2 - 12.9 fL    Gran # (ANC) 4.0 1.8 - 7.7 K/uL    Lymph # 2.3 1.0 - 4.8 K/uL    Mono # 0.4 0.3 - 1.0 K/uL    Eos # 0.2 0.0 - 0.5 K/uL    Baso # 0.02 0.00 - 0.20 K/uL    Gran% 57.6 38.0 - 73.0 %    Lymph% 33.8 18.0 - 48.0 %    Mono% 6.1 4.0 - 15.0 %    Eosinophil% 2.2 0.0 - 8.0 %    Basophil% 0.3 0.0 - 1.9 %    Differential Method Automated    Comprehensive metabolic panel   Result Value Ref Range    Sodium 139 136 - 145 mmol/L    Potassium 4.2 3.5 - 5.1 mmol/L    Chloride 105 95 - 110 mmol/L    CO2 20 (L) 23 - 29 mmol/L    Glucose 271 (H) 70 - 110 mg/dL    BUN, Bld 11 6 - 20 mg/dL    Creatinine 0.8 0.5 - 1.4 mg/dL    Calcium 9.8 8.7 - 10.5 mg/dL    Total Protein 7.8 6.0 - 8.4 g/dL    Albumin 3.8 3.5 - " 5.2 g/dL    Total Bilirubin 0.9 0.1 - 1.0 mg/dL    Alkaline Phosphatase 114 55 - 135 U/L    AST 16 10 - 40 U/L    ALT 20 10 - 44 U/L    Anion Gap 14 8 - 16 mmol/L    eGFR if African American >60 >60 mL/min/1.73 m^2    eGFR if non African American >60 >60 mL/min/1.73 m^2   Troponin I #1   Result Value Ref Range    Troponin I <0.006 0.000 - 0.026 ng/mL   B-Type natriuretic peptide (BNP)   Result Value Ref Range    BNP 73 0 - 99 pg/mL   Protime-INR   Result Value Ref Range    Prothrombin Time 9.6 9.0 - 12.5 sec    INR 0.9 0.8 - 1.2   APTT   Result Value Ref Range    aPTT 25.7 21.0 - 32.0 sec   POCT glucose   Result Value Ref Range    POCT Glucose 248 (H) 70 - 110 mg/dL         Imaging Results:  Imaging Results          X-Ray Chest PA And Lateral (Final result)  Result time 06/14/18 07:28:34    Final result by OCRRY Aquino Sr., MD (06/14/18 07:28:34)                 Impression:      Normal study.      Electronically signed by: Bro Aquino MD  Date:    06/14/2018  Time:    07:28             Narrative:    EXAMINATION:  XR CHEST PA AND LATERAL    CLINICAL HISTORY:  Chest Pain;    COMPARISON:  09/16/2017    FINDINGS:  The size and contour of the heart are normal. The lungs are clear. There is no pneumothorax or pleural effusion.                               The EKG was ordered, reviewed, and independently interpreted by the ED provider.  Interpretation time: 6:39  Rate: 78 BPM  Rhythm: normal sinus rhythm  Interpretation: Possible left atrial enlargement. Anterior infarct. No STEMI.           The Emergency Provider reviewed the vital signs and test results, which are outlined above.    ED Discussion     9:43 AM: Reassessed pt at this time. Instructed patient to f/u with her cardiologist as an outpatient. Patient expresses understanding at this time. Discussed with pt all pertinent ED information and results. Discussed pt dx and plan of tx. Gave pt all f/u and return to the ED instructions. All questions and  concerns were addressed at this time. Pt expresses understanding of information and instructions, and is comfortable with plan to discharge. Pt is stable for discharge.    I discussed with patient and/or family/caretaker that evaluation in the ED does not suggest any emergent or life threatening medical conditions requiring immediate intervention beyond what was provided in the ED, and I believe patient is safe for discharge.  Regardless, an unremarkable evaluation in the ED does not preclude the development or presence of a serious of life threatening condition. As such, patient was instructed to return immediately for any worsening or change in current symptoms.      ED Medication(s):  Medications   acetaminophen tablet 650 mg (650 mg Oral Given 6/14/18 0728)       New Prescriptions    No medications on file       Follow-up Information     MARIYA Altamirano. Schedule an appointment as soon as possible for a visit in 1 day.    Specialty:  Family Medicine  Why:  Return to the Emergency Room, If symptoms worsen  Contact information:  [address]  [address]  [facility] [address]  [phone]                     Medical Decision Making    Medical Decision Making:   Clinical Tests:   Lab Tests: Reviewed and Ordered  Radiological Study: Reviewed and Ordered  Medical Tests: Reviewed and Ordered           Scribe Attestation:   Scribe #1: I performed the above scribed service and the documentation accurately describes the services I performed. I attest to the accuracy of the note.    Attending:   Physician Attestation Statement for Scribe #1: I, Noris De Paz MD, personally performed the services described in this documentation, as scribed by Matheus Lucero, in my presence, and it is both accurate and complete.        Clinical Impression       ICD-10-CM ICD-9-CM   1. Dyspnea, unspecified type R06.00 786.09   2. Shortness of breath R06.02 786.05   3. Palpitations R00.2 785.1       Disposition:   Disposition:  Discharged  Condition: Stable         Noris De Paz MD  06/18/18 6468

## 2019-02-28 ENCOUNTER — HOSPITAL ENCOUNTER (EMERGENCY)
Facility: HOSPITAL | Age: 49
Discharge: HOME | End: 2019-02-28
Attending: EMERGENCY MEDICINE
Payer: COMMERCIAL

## 2019-02-28 VITALS
HEIGHT: 62 IN | HEART RATE: 88 BPM | BODY MASS INDEX: 32.54 KG/M2 | OXYGEN SATURATION: 100 % | DIASTOLIC BLOOD PRESSURE: 70 MMHG | SYSTOLIC BLOOD PRESSURE: 142 MMHG | TEMPERATURE: 99 F | RESPIRATION RATE: 18 BRPM | WEIGHT: 176.81 LBS

## 2019-02-28 DIAGNOSIS — S20.211A RIB CONTUSION, RIGHT, INITIAL ENCOUNTER: Primary | ICD-10-CM

## 2019-02-28 LAB — POCT GLUCOSE: 227 MG/DL (ref 70–110)

## 2019-02-28 PROCEDURE — 82962 GLUCOSE BLOOD TEST: CPT

## 2019-02-28 PROCEDURE — 99284 EMERGENCY DEPT VISIT MOD MDM: CPT | Mod: 25

## 2019-02-28 RX ORDER — ETODOLAC 400 MG/1
400 TABLET, FILM COATED ORAL 2 TIMES DAILY
Qty: 20 TABLET | Refills: 0 | Status: SHIPPED | OUTPATIENT
Start: 2019-02-28 | End: 2019-10-30

## 2019-02-28 RX ORDER — ORPHENADRINE CITRATE 100 MG/1
100 TABLET, EXTENDED RELEASE ORAL 2 TIMES DAILY
Qty: 20 TABLET | Refills: 0 | Status: SHIPPED | OUTPATIENT
Start: 2019-02-28 | End: 2019-03-10

## 2019-03-01 NOTE — ED PROVIDER NOTES
"SCRIBE #1 NOTE: I, So Arnaud, am scribing for, and in the presence of, RICARDO Pandya . I have scribed the entire note.       History     Chief Complaint   Patient presents with    Abdominal Pain     States pain to RUQ radiating to back for several days with increase in pain with cough, deep breath or movement. Tender to palpation     Review of patient's allergies indicates:   Allergen Reactions    Morphine      Reports "headache"         History of Present Illness     HPI    2019, 7:17 PM  History obtained from the patient      History of Present Illness: Yue Serrato is a 49 y.o. female patient with a PMHx of DM, dyslipidemia, HAD, HTN, and obesity and a PSHx of hysterectomy who presents to the Emergency Department for evaluation of R upper rib pain radiating to R lower back which onset gradually x3-4 days ago right after she sneezed. Pt states she has never experienced this pain before. Symptoms are constant and moderate in severity. Sxs mitigated by staying still. Sxs exacerbated by palpation, cough, deep breathe, or movement. No other associated sxs reported. Patient denies any hematuria, dysuria, abd pain, blood in stool, n/v/d, fever, chills, numbness, weakness, and all other sxs at this time. No prior tx reported. No further complaints or concerns at this time.         Arrival mode: Personal vehicle     PCP: MARIYA Altamirano        Past Medical History:  Past Medical History:   Diagnosis Date    Anxiety     Depression     Diabetes mellitus type II     Diverticulitis     Hyperlipidemia     Hypertension        Past Surgical History:  Past Surgical History:   Procedure Laterality Date     SECTION      three    HYSTERECTOMY      2005    LIPOMA RESECTION           Family History:  Family History   Problem Relation Age of Onset    Heart disease Mother         CAD    Diabetes Mother     Hypertension Mother     Hyperlipidemia Mother     COPD Mother     COPD Father     " Diabetes Father     Hyperlipidemia Father     Hypertension Father     Heart disease Father         MI       Social History:  Social History     Tobacco Use    Smoking status: Never Smoker    Smokeless tobacco: Never Used   Substance and Sexual Activity    Alcohol use: No    Drug use: No    Sexual activity: Not Currently     Partners: Male        Review of Systems     Review of Systems   Constitutional: Negative for chills and fever.   HENT: Positive for sneezing. Negative for sore throat.    Respiratory: Negative for shortness of breath.    Cardiovascular: Negative for chest pain.   Gastrointestinal: Negative for abdominal pain, blood in stool, diarrhea, nausea and vomiting.   Genitourinary: Negative for dysuria and hematuria.   Musculoskeletal:        (+) R upper rib pain radiating to R lower back   Skin: Negative for rash.   Neurological: Negative for weakness and numbness.   Hematological: Does not bruise/bleed easily.   All other systems reviewed and are negative.     Physical Exam     Initial Vitals [02/28/19 1912]   BP Pulse Resp Temp SpO2   (!) 146/81 81 20 97.7 °F (36.5 °C) 99 %      MAP       --          Physical Exam  Nursing Notes and Vital Signs Reviewed.  Constitutional: Patient is in no acute distress. Well-developed and well-nourished.  Head: Atraumatic. Normocephalic.  Eyes: PERRL. EOM intact. Conjunctivae are not pale. No scleral icterus.  ENT: Mucous membranes are moist. Oropharynx is clear and symmetric.    Neck: Supple. Full ROM. No lymphadenopathy.  Cardiovascular: Regular rate. Regular rhythm. No murmurs, rubs, or gallops. Distal pulses are 2+ and symmetric.  Pulmonary/Chest: No respiratory distress. Clear to auscultation bilaterally. No wheezing or rales.  Abdominal: Soft and non-distended.  There is no tenderness.  No rebound, guarding, or rigidity. Good bowel sounds.  Genitourinary: No CVA tenderness  Musculoskeletal: Moves all extremities. No obvious deformities. No edema. No calf  "tenderness. R upper rib TTP.  Skin: Warm and dry.  Neurological:  Alert, awake, and appropriate.  Normal speech.  No acute focal neurological deficits are appreciated.  Psychiatric: Normal affect. Good eye contact. Appropriate in content.     ED Course   Procedures  ED Vital Signs:  Vitals:    02/28/19 1912   BP: (!) 146/81   Pulse: 81   Resp: 20   Temp: 97.7 °F (36.5 °C)   TempSrc: Oral   SpO2: 99%   Weight: 80.2 kg (176 lb 12.9 oz)   Height: 5' 2" (1.575 m)       Abnormal Lab Results:  Labs Reviewed   POCT GLUCOSE - Abnormal; Notable for the following components:       Result Value    POCT Glucose 227 (*)     All other components within normal limits   POCT GLUCOSE MONITORING CONTINUOUS        All Lab Results:  Results for orders placed or performed during the hospital encounter of 02/28/19   POCT glucose   Result Value Ref Range    POCT Glucose 227 (H) 70 - 110 mg/dL         Imaging Results:  Imaging Results          XR Ribs Min 3 Views w/PA Chest Right (Final result)  Result time 02/28/19 19:43:36   Procedure changed from XR Ribs Min 3 views w/PA Chest Left     Final result by Vernon Cervantes MD (02/28/19 19:43:36)                 Impression:      As above.      Electronically signed by: Vernon Cervantes  Date:    02/28/2019  Time:    19:43             Narrative:    EXAMINATION:  XR RIBS MIN 3 VIEWS W/ PA CHEST RIGHT    CLINICAL HISTORY:  pain;    TECHNIQUE:  Two views of the right ribs were performed.    COMPARISON:  06/14/2018    FINDINGS:  No displaced rib fracture identified.  No pneumothorax or pleural fluid.                                    The Emergency Provider reviewed the vital signs and test results, which are outlined above.     ED Discussion     8:13 PM: Reassessed pt at this time.  Pt states her condition has improved at this time. Discussed with pt all pertinent ED information and results. Discussed pt dx and plan of tx. Gave pt all f/u and return to the ED instructions. All questions and concerns " were addressed at this time. Pt expresses understanding of information and instructions, and is comfortable with plan to discharge. Pt is stable for discharge.    I discussed with patient and/or family/caretaker that negative X-ray does not rule out occult fracture or other soft tissue injury.  Persistent pain greater than 7-10 days or increased pain requires follow up, specifically with orthopedics.     I discussed with patient and/or family/caretaker that evaluation in the ED does not suggest any emergent or life threatening medical conditions requiring immediate intervention beyond what was provided in the ED, and I believe patient is safe for discharge.  Regardless, an unremarkable evaluation in the ED does not preclude the development or presence of a serious of life threatening condition. As such, patient was instructed to return immediately for any worsening or change in current symptoms.    ED Medication(s):  Medications - No data to display    New Prescriptions    ETODOLAC (LODINE) 400 MG TABLET    Take 1 tablet (400 mg total) by mouth 2 (two) times daily.    ORPHENADRINE (NORFLEX) 100 MG TABLET    Take 1 tablet (100 mg total) by mouth 2 (two) times daily. for 10 days       Follow-up Information     MARIYA Altamirano. Schedule an appointment as soon as possible for a visit in 3 days.    Specialty:  Family Medicine  Why:  If symptoms worsen  Contact information:  [address] [address]  [address] [place] [address]  [phone]                         Medical Decision Making:   Clinical Tests:   Lab Tests: Reviewed and Ordered  Radiological Study: Reviewed and Ordered             Scribe Attestation:   Scribe #1: I performed the above scribed service and the documentation accurately describes the services I performed. I attest to the accuracy of the note.     Attending:   Physician Attestation Statement for Scribe #1: I, RICARDO Pandya , personally performed the services described in this documentation, as  scribed by So Lares, in my presence, and it is both accurate and complete.           Clinical Impression       ICD-10-CM ICD-9-CM   1. Rib contusion, right, initial encounter S20.211A 922.1       Disposition:   Disposition: Discharged  Condition: Stable       RICARDO Schaffer  03/01/19 0133

## 2019-10-19 ENCOUNTER — OFFICE VISIT (OUTPATIENT)
Dept: URGENT CARE | Facility: CLINIC | Age: 49
End: 2019-10-19
Payer: COMMERCIAL

## 2019-10-19 VITALS
HEIGHT: 63 IN | BODY MASS INDEX: 31.23 KG/M2 | SYSTOLIC BLOOD PRESSURE: 145 MMHG | WEIGHT: 176.25 LBS | DIASTOLIC BLOOD PRESSURE: 83 MMHG | TEMPERATURE: 98 F | HEART RATE: 83 BPM

## 2019-10-19 DIAGNOSIS — J02.0 PHARYNGITIS DUE TO STREPTOCOCCUS SPECIES: Primary | ICD-10-CM

## 2019-10-19 DIAGNOSIS — I15.2 HYPERTENSION ASSOCIATED WITH DIABETES: ICD-10-CM

## 2019-10-19 DIAGNOSIS — E11.59 HYPERTENSION ASSOCIATED WITH DIABETES: ICD-10-CM

## 2019-10-19 DIAGNOSIS — R05.9 COUGH: ICD-10-CM

## 2019-10-19 LAB
CTP QC/QA: YES
S PYO RRNA THROAT QL PROBE: NEGATIVE

## 2019-10-19 PROCEDURE — 3077F SYST BP >= 140 MM HG: CPT | Mod: CPTII,S$GLB,, | Performed by: NURSE PRACTITIONER

## 2019-10-19 PROCEDURE — 87880 STREP A ASSAY W/OPTIC: CPT | Mod: QW,S$GLB,, | Performed by: NURSE PRACTITIONER

## 2019-10-19 PROCEDURE — 3008F BODY MASS INDEX DOCD: CPT | Mod: CPTII,S$GLB,, | Performed by: NURSE PRACTITIONER

## 2019-10-19 PROCEDURE — 3077F PR MOST RECENT SYSTOLIC BLOOD PRESSURE >= 140 MM HG: ICD-10-PCS | Mod: CPTII,S$GLB,, | Performed by: NURSE PRACTITIONER

## 2019-10-19 PROCEDURE — 99214 PR OFFICE/OUTPT VISIT, EST, LEVL IV, 30-39 MIN: ICD-10-PCS | Mod: S$GLB,,, | Performed by: NURSE PRACTITIONER

## 2019-10-19 PROCEDURE — 87880 POCT RAPID STREP A: ICD-10-PCS | Mod: QW,S$GLB,, | Performed by: NURSE PRACTITIONER

## 2019-10-19 PROCEDURE — 99999 PR PBB SHADOW E&M-EST. PATIENT-LVL IV: ICD-10-PCS | Mod: PBBFAC,,, | Performed by: NURSE PRACTITIONER

## 2019-10-19 PROCEDURE — 3079F PR MOST RECENT DIASTOLIC BLOOD PRESSURE 80-89 MM HG: ICD-10-PCS | Mod: CPTII,S$GLB,, | Performed by: NURSE PRACTITIONER

## 2019-10-19 PROCEDURE — 99214 OFFICE O/P EST MOD 30 MIN: CPT | Mod: S$GLB,,, | Performed by: NURSE PRACTITIONER

## 2019-10-19 PROCEDURE — 3079F DIAST BP 80-89 MM HG: CPT | Mod: CPTII,S$GLB,, | Performed by: NURSE PRACTITIONER

## 2019-10-19 PROCEDURE — 3008F PR BODY MASS INDEX (BMI) DOCUMENTED: ICD-10-PCS | Mod: CPTII,S$GLB,, | Performed by: NURSE PRACTITIONER

## 2019-10-19 PROCEDURE — 99999 PR PBB SHADOW E&M-EST. PATIENT-LVL IV: CPT | Mod: PBBFAC,,, | Performed by: NURSE PRACTITIONER

## 2019-10-19 RX ORDER — INSULIN DEGLUDEC 200 U/ML
INJECTION, SOLUTION SUBCUTANEOUS
Refills: 5 | COMMUNITY
Start: 2019-10-06

## 2019-10-19 RX ORDER — DULAGLUTIDE 1.5 MG/.5ML
INJECTION, SOLUTION SUBCUTANEOUS
Refills: 5 | COMMUNITY
Start: 2019-09-28

## 2019-10-19 RX ORDER — AMOXICILLIN 875 MG/1
875 TABLET, FILM COATED ORAL 2 TIMES DAILY
Refills: 0 | COMMUNITY
Start: 2019-10-14 | End: 2021-09-16

## 2019-10-19 RX ORDER — PROMETHAZINE HYDROCHLORIDE AND DEXTROMETHORPHAN HYDROBROMIDE 6.25; 15 MG/5ML; MG/5ML
5 SYRUP ORAL
Qty: 120 ML | Refills: 0 | Status: SHIPPED | OUTPATIENT
Start: 2019-10-19 | End: 2021-09-16

## 2019-10-19 RX ORDER — FENOFIBRATE 48 MG/1
48 TABLET, FILM COATED ORAL
COMMUNITY
End: 2021-09-16

## 2019-10-19 NOTE — PROGRESS NOTES
CHIEF COMPLAINT/REASON FOR VISIT: Sore throat, cough, body aches     HISTORY OF PRESENT ILLNESS:   49  year-old  female complains of sore throat, nasal congestion, cough and body aches onset Monday.  Seen and treated per primary care provider at [facility]  with a diagnosis of strep throat, given amoxicillin with no relief.  Patient insisting on an injection and changing antibiotics.  Discussed with patient the need for further evaluation with exam and repeat strep screen.  Patient agrees with plan of therapy.  Denies chest pain, shortness of breath, nausea, vomiting, diarrhea, fever.  Denies trying to telephone or reach out to primary care provider.          Past Medical History:   Diagnosis Date    Anxiety     Depression     Diabetes mellitus type II     Diverticulitis     Hyperlipidemia     Hypertension          .  Past Surgical History:   Procedure Laterality Date     SECTION      three    HYSTERECTOMY      2005    LIPOMA RESECTION           Social History     Socioeconomic History    Marital status:      Spouse name: Not on file    Number of children: 3    Years of education: Not on file    Highest education level: Not on file   Occupational History     Employer: [employer]    Social Needs    Financial resource strain: Not on file    Food insecurity:     Worry: Not on file     Inability: Not on file    Transportation needs:     Medical: Not on file     Non-medical: Not on file   Tobacco Use    Smoking status: Never Smoker    Smokeless tobacco: Never Used   Substance and Sexual Activity    Alcohol use: No    Drug use: No    Sexual activity: Not Currently     Partners: Male   Lifestyle    Physical activity:     Days per week: Not on file     Minutes per session: Not on file    Stress: Not on file   Relationships    Social connections:     Talks on phone: Not on file     Gets together: Not on file     Attends [employer] service: Not on file     Active member of club or  organization: Not on file     Attends meetings of clubs or organizations: Not on file     Relationship status: Not on file   Other Topics Concern    Not on file   Social History Narrative    Not on file       Family History   Problem Relation Age of Onset    Heart disease Mother         CAD    Diabetes Mother     Hypertension Mother     Hyperlipidemia Mother     COPD Mother     COPD Father     Diabetes Father     Hyperlipidemia Father     Hypertension Father     Heart disease Father         MI         ROS:  GENERAL:  Body aches  SKIN: No rashes, itching or changes in color or texture of skin.   HEENT:  Reports sore  throat,  nasal congestion, headache  NODES: No masses or lesions. Denies swollen glands.   CHEST: reports cough and sputum production.   CARDIOVASCULAR: Denies chest pain, shortness of breath.  ABDOMEN: Appetite fine. No weight loss. Denies diarrhea, abdominal pain.  MUSCULOSKELETAL: No joint stiffness or swelling. Denies back pain.  NEUROLOGIC: No history of seizures, paralysis, alteration of gait or coordination.  PSYCHIATRIC: Denies mood swings, depression or suicidal thoughts.    PE:   APPEARANCE: Well nourished, well developed, in mild distress.   V/S: Reviewed.  SKIN: Normal skin turgor, no lesions.  HEENT: Turbinates injected, minimal red pharynx. TM's poor light reflex bilateral,   minimal facial tenderness.  CHEST: Lungs clear to auscultation.  No wheezing  CARDIOVASCULAR: Regular rate and rhythm.  NEUROLOGIC: No sensory deficits. Gait & Posture: Normal, No cerebellar signs.  MENTAL STATUS: Patient alert, oriented x 3 & conversant.    PLAN: Lab work POCT rapid strep screen, positive strep on Monday a non [facility]  Advise increase p.o. fluids-- water/juice & rest  Meds:  Phenergan DM  / no refills  Advise complete antibiotics  Simply saline nasal wash to irrigate sinuses and for congestion/runny nose.  Cool mist humidifier/vaporizer.  Practice good handwashing.  Advise hot tea  with honey  Tylenol or Ibuprofen for fever, headache and body aches.  Warm salt water gargles for throat comfort.  Chloraseptic spray or lozenges for throat comfort.  Advise follow up with PCP in 1-2 days for recheck  Advise go to ER if symptoms worsen or fail to improve with treatment.  AVS provided and reviewed with patient including supportive care, follow up, and red flag symptoms.   Patient verbalizes understanding and agrees with treatment plan. Discharged from Urgent Care in stable condition.      DIAGNOSIS:  Cough  Streptococcal Pharyngitis  Hypertension associated with type 2 diabetes

## 2019-10-30 ENCOUNTER — OFFICE VISIT (OUTPATIENT)
Dept: URGENT CARE | Facility: CLINIC | Age: 49
End: 2019-10-30
Payer: COMMERCIAL

## 2019-10-30 ENCOUNTER — HOSPITAL ENCOUNTER (OUTPATIENT)
Dept: RADIOLOGY | Facility: HOSPITAL | Age: 49
Discharge: HOME | End: 2019-10-30
Attending: PHYSICIAN ASSISTANT
Payer: COMMERCIAL

## 2019-10-30 VITALS
HEIGHT: 63 IN | DIASTOLIC BLOOD PRESSURE: 74 MMHG | SYSTOLIC BLOOD PRESSURE: 137 MMHG | WEIGHT: 176.56 LBS | OXYGEN SATURATION: 99 % | BODY MASS INDEX: 31.29 KG/M2 | HEART RATE: 104 BPM | TEMPERATURE: 100 F | RESPIRATION RATE: 16 BRPM

## 2019-10-30 DIAGNOSIS — M71.9 BURSITIS, UNSPECIFIED SITE: ICD-10-CM

## 2019-10-30 DIAGNOSIS — N30.01 ACUTE CYSTITIS WITH HEMATURIA: ICD-10-CM

## 2019-10-30 DIAGNOSIS — M25.552 LEFT HIP PAIN: ICD-10-CM

## 2019-10-30 DIAGNOSIS — R35.0 URINARY FREQUENCY: Primary | ICD-10-CM

## 2019-10-30 PROCEDURE — 3075F SYST BP GE 130 - 139MM HG: CPT | Mod: CPTII,S$GLB,, | Performed by: PHYSICIAN ASSISTANT

## 2019-10-30 PROCEDURE — 99214 PR OFFICE/OUTPT VISIT, EST, LEVL IV, 30-39 MIN: ICD-10-PCS | Mod: S$GLB,,, | Performed by: PHYSICIAN ASSISTANT

## 2019-10-30 PROCEDURE — 3008F PR BODY MASS INDEX (BMI) DOCUMENTED: ICD-10-PCS | Mod: CPTII,S$GLB,, | Performed by: PHYSICIAN ASSISTANT

## 2019-10-30 PROCEDURE — 3078F PR MOST RECENT DIASTOLIC BLOOD PRESSURE < 80 MM HG: ICD-10-PCS | Mod: CPTII,S$GLB,, | Performed by: PHYSICIAN ASSISTANT

## 2019-10-30 PROCEDURE — 73502 XR HIP 2 VIEW LEFT: ICD-10-PCS | Mod: 26,LT,, | Performed by: RADIOLOGY

## 2019-10-30 PROCEDURE — 99214 OFFICE O/P EST MOD 30 MIN: CPT | Mod: S$GLB,,, | Performed by: PHYSICIAN ASSISTANT

## 2019-10-30 PROCEDURE — 3078F DIAST BP <80 MM HG: CPT | Mod: CPTII,S$GLB,, | Performed by: PHYSICIAN ASSISTANT

## 2019-10-30 PROCEDURE — 99999 PR PBB SHADOW E&M-EST. PATIENT-LVL IV: ICD-10-PCS | Mod: PBBFAC,,, | Performed by: PHYSICIAN ASSISTANT

## 2019-10-30 PROCEDURE — 73502 X-RAY EXAM HIP UNI 2-3 VIEWS: CPT | Mod: TC,PO,LT

## 2019-10-30 PROCEDURE — 73502 X-RAY EXAM HIP UNI 2-3 VIEWS: CPT | Mod: 26,LT,, | Performed by: RADIOLOGY

## 2019-10-30 PROCEDURE — 3008F BODY MASS INDEX DOCD: CPT | Mod: CPTII,S$GLB,, | Performed by: PHYSICIAN ASSISTANT

## 2019-10-30 PROCEDURE — 3075F PR MOST RECENT SYSTOLIC BLOOD PRESS GE 130-139MM HG: ICD-10-PCS | Mod: CPTII,S$GLB,, | Performed by: PHYSICIAN ASSISTANT

## 2019-10-30 PROCEDURE — 99999 PR PBB SHADOW E&M-EST. PATIENT-LVL IV: CPT | Mod: PBBFAC,,, | Performed by: PHYSICIAN ASSISTANT

## 2019-10-30 RX ORDER — NAPROXEN 500 MG/1
500 TABLET ORAL 2 TIMES DAILY WITH MEALS
Qty: 20 TABLET | Refills: 0 | Status: SHIPPED | OUTPATIENT
Start: 2019-10-30 | End: 2020-02-01 | Stop reason: SDUPTHER

## 2019-10-30 RX ORDER — NITROFURANTOIN (MACROCRYSTALS) 100 MG/1
100 CAPSULE ORAL EVERY 12 HOURS
Qty: 14 CAPSULE | Refills: 0 | Status: SHIPPED | OUTPATIENT
Start: 2019-10-30 | End: 2019-11-06

## 2019-10-30 NOTE — PATIENT INSTRUCTIONS
Macrobid sent to pharmacy.  Naproxen sent to pharmacy.  Follow up with Primary care physician and/or ortho if symptoms do not resolve.  Report to ER with new or worsening symptoms.    Bursitis  You have bursitis. This is an inflammation of the bursa. These are small, fluid-filled sacs that surround the larger joints of the body. The bursa help the muscles and tendons move smoothly over the joints.  Bursitis often happens in the shoulder. But it can also affect the elbows, hips, pelvis, knees, toes, and heels. Bursitis can be caused by injury, overuse of the joint, or infection of the bursa. Symptoms include pain and tenderness over a joint. Symptoms get worse with movement.  Bursitis is treated with an anti-inflammatory medicine and by resting the joint. More severe cases require injection of medicine directly into the bursa.    Home care  · Rest the painful joint and protect it from movement. This will allow the inflammation to heal faster.  · Apply an ice pack over the injured area for no more than 15 to 20 minutes. Do this every 3 to 6 hours for the first 24 to 48 hours. Keep using ice packs 3 to 4 times a day until the pain and swelling improves.   · To make an ice pack, put ice cubes in a sealed plastic zip-lock bag. Wrap the bag in a clean, thin towel or cloth. Never put ice or an ice pack directly on the skin. As the ice melts, be careful to avoid getting any wrap or splint wet.  · You may take over-the-counter pain medicine to treat pain and inflammation, unless another medicine was prescribed. Anti-inflammatory pain medicines may be more effective. Talk with your provider beforeusing these medicines if you have chronic liver or kidney disease, or ever had a stomach ulcer or GI (gastrointestinal) bleeding.  · As your symptoms improve, slowly begin to move the joint. Do not overuse the joint. This may cause the symptoms to flare up again.  When to seek medical advice  Call your healthcare provider right away  if any of these occur:  · Redness over the painful area  · Increasing pain or swelling at the joint  · Fever of 100.4°F (38°C) or above lasting for 24 to 48 hours  Date Last Reviewed: 11/21/2015  © 9229-1767 [employer]. [address] [place], [place] [address]. All rights reserved. This information is not intended as a substitute for professional medical care. Always follow your healthcare professional's instructions.

## 2019-10-30 NOTE — PROGRESS NOTES
"Yue Serrato is a 49 year old female who presents today with complaints of left hip pain for a few weeks.  No fall or injury.  No history of arthritis.  No numbness or tingling.  She states the pain is located to the lateral aspect of hip and is a sharp and shooting pain.  She is able to ambulate but states bending movements and prolonged standing worsen the pain.  She has tried tylenol and Aleve with minimal relief.  She also started to experience abnormal smelling urine and urinary frequency that started this weekend.    All areas of patients chart reviewed including past medical history, past surgical history, medications, allergies, family history, and social history.    Review of Systems   Constitutional: Negative for fever.   HENT: Negative for sore throat.    Respiratory: Negative for shortness of breath.    Cardiovascular: Negative for chest pain.   Gastrointestinal: Negative for abdominal pain and nausea.   Genitourinary: Positive for frequency. Negative for dysuria, flank pain, hematuria and urgency.   Musculoskeletal: Positive for joint pain. Negative for back pain and falls.   Neurological: Negative for headaches.   All other systems reviewed and are negative.    Objective:  /74 (BP Location: Left arm)   Pulse 104   Temp 99.6 °F (37.6 °C)   Resp 16   Ht 5' 3" (1.6 m)   Wt 80.1 kg (176 lb 9.4 oz)   SpO2 99%   BMI 31.28 kg/m²   Physical Exam   Constitutional: She is oriented to person, place, and time and well-developed, well-nourished, and in no distress.   HENT:   Head: Normocephalic.   Right Ear: External ear normal.   Left Ear: External ear normal.   Mouth/Throat: No oropharyngeal exudate.   Neck: Normal range of motion.   Cardiovascular: Normal rate and normal heart sounds.   Pulmonary/Chest: Effort normal and breath sounds normal. No respiratory distress.   Abdominal: Soft. Normal appearance. There is no tenderness. There is no CVA tenderness.   Musculoskeletal:        Left hip: She " exhibits tenderness. She exhibits normal range of motion, normal strength and no deformity.        Legs:  Neurological: She is alert and oriented to person, place, and time.   Skin: Skin is warm.   Psychiatric: Affect normal.     Assessment:  Encounter Diagnoses   Name Primary?    Urinary frequency Yes    Bursitis, unspecified site     Left hip pain      Plan:  U/A shows 1+ leukocytes.  Nitrite negative.  Trace blood.  Xray shows no acute findings on wet read.  Patient declined Toradol injection.  Macrobid and naproxen sent to pharmacy.    Follow up with Primary care physician and/or ortho if symptoms do not resolve.  Report to ER with new or worsening symptoms.

## 2020-02-01 ENCOUNTER — OFFICE VISIT (OUTPATIENT)
Dept: URGENT CARE | Facility: CLINIC | Age: 50
End: 2020-02-01
Payer: COMMERCIAL

## 2020-02-01 ENCOUNTER — HOSPITAL ENCOUNTER (OUTPATIENT)
Dept: RADIOLOGY | Facility: HOSPITAL | Age: 50
Discharge: HOME | End: 2020-02-01
Attending: PHYSICIAN ASSISTANT
Payer: COMMERCIAL

## 2020-02-01 VITALS
WEIGHT: 178.56 LBS | DIASTOLIC BLOOD PRESSURE: 65 MMHG | SYSTOLIC BLOOD PRESSURE: 129 MMHG | BODY MASS INDEX: 31.64 KG/M2 | HEART RATE: 93 BPM | OXYGEN SATURATION: 99 % | HEIGHT: 63 IN | TEMPERATURE: 99 F

## 2020-02-01 DIAGNOSIS — M25.552 PAIN OF LEFT HIP JOINT: ICD-10-CM

## 2020-02-01 DIAGNOSIS — M71.9 BURSITIS, UNSPECIFIED SITE: ICD-10-CM

## 2020-02-01 DIAGNOSIS — M54.32 SCIATICA OF LEFT SIDE: Primary | ICD-10-CM

## 2020-02-01 PROCEDURE — 99999 PR PBB SHADOW E&M-EST. PATIENT-LVL III: CPT | Mod: PBBFAC,,, | Performed by: PHYSICIAN ASSISTANT

## 2020-02-01 PROCEDURE — 3008F BODY MASS INDEX DOCD: CPT | Mod: CPTII,S$GLB,, | Performed by: PHYSICIAN ASSISTANT

## 2020-02-01 PROCEDURE — 99214 OFFICE O/P EST MOD 30 MIN: CPT | Mod: S$GLB,,, | Performed by: PHYSICIAN ASSISTANT

## 2020-02-01 PROCEDURE — 3078F DIAST BP <80 MM HG: CPT | Mod: CPTII,S$GLB,, | Performed by: PHYSICIAN ASSISTANT

## 2020-02-01 PROCEDURE — 73502 XR HIP 2 VIEW LEFT: ICD-10-PCS | Mod: 26,LT,, | Performed by: RADIOLOGY

## 2020-02-01 PROCEDURE — 73502 X-RAY EXAM HIP UNI 2-3 VIEWS: CPT | Mod: TC,PO,LT

## 2020-02-01 PROCEDURE — 3008F PR BODY MASS INDEX (BMI) DOCUMENTED: ICD-10-PCS | Mod: CPTII,S$GLB,, | Performed by: PHYSICIAN ASSISTANT

## 2020-02-01 PROCEDURE — 3074F PR MOST RECENT SYSTOLIC BLOOD PRESSURE < 130 MM HG: ICD-10-PCS | Mod: CPTII,S$GLB,, | Performed by: PHYSICIAN ASSISTANT

## 2020-02-01 PROCEDURE — 99214 PR OFFICE/OUTPT VISIT, EST, LEVL IV, 30-39 MIN: ICD-10-PCS | Mod: S$GLB,,, | Performed by: PHYSICIAN ASSISTANT

## 2020-02-01 PROCEDURE — 3074F SYST BP LT 130 MM HG: CPT | Mod: CPTII,S$GLB,, | Performed by: PHYSICIAN ASSISTANT

## 2020-02-01 PROCEDURE — 99999 PR PBB SHADOW E&M-EST. PATIENT-LVL III: ICD-10-PCS | Mod: PBBFAC,,, | Performed by: PHYSICIAN ASSISTANT

## 2020-02-01 PROCEDURE — 3078F PR MOST RECENT DIASTOLIC BLOOD PRESSURE < 80 MM HG: ICD-10-PCS | Mod: CPTII,S$GLB,, | Performed by: PHYSICIAN ASSISTANT

## 2020-02-01 PROCEDURE — 73502 X-RAY EXAM HIP UNI 2-3 VIEWS: CPT | Mod: 26,LT,, | Performed by: RADIOLOGY

## 2020-02-01 RX ORDER — FAMOTIDINE 20 MG/1
20 TABLET, FILM COATED ORAL
COMMUNITY
Start: 2019-12-23 | End: 2021-10-19 | Stop reason: ALTCHOICE

## 2020-02-01 RX ORDER — NAPROXEN 500 MG/1
500 TABLET ORAL 2 TIMES DAILY WITH MEALS
Qty: 20 TABLET | Refills: 0 | Status: SHIPPED | OUTPATIENT
Start: 2020-02-01 | End: 2020-02-01

## 2020-02-01 NOTE — PROGRESS NOTES
"Yue Serrato is a 49 year old female who presents with complaints of left leg/hip pain that started a few days ago when she leaned over and heard a popping noise.  She is experiencing pain that starts at her left low back/buttocks and radiates down the back of her leg.  She wants an xray today to evaluate for any injury of her hip.  She is able to ambulate on the leg but it does reproduce pain.  No numbness/tingling sensations.    All areas of patients chart reviewed including past medical history, past surgical history, medications, allergies, family history, and social history.    Review of Systems   Constitutional: Negative for fever.   HENT: Negative for sore throat.    Respiratory: Negative for shortness of breath.    Cardiovascular: Negative for chest pain.   Gastrointestinal: Negative for abdominal pain and nausea.   Genitourinary: Negative for dysuria and frequency.   Musculoskeletal: Positive for back pain and joint pain.   Neurological: Negative for headaches.   All other systems reviewed and are negative.    Objective:  /65   Pulse 93   Temp 98.8 °F (37.1 °C) (Tympanic)   Ht 5' 3" (1.6 m)   Wt 81 kg (178 lb 9.2 oz)   SpO2 99%   BMI 31.63 kg/m²   Physical Exam   Constitutional: She is oriented to person, place, and time and well-developed, well-nourished, and in no distress.   HENT:   Head: Normocephalic.   Right Ear: External ear normal.   Left Ear: External ear normal.   Mouth/Throat: No oropharyngeal exudate.   Neck: Normal range of motion.   Cardiovascular: Normal rate and normal heart sounds.   Pulmonary/Chest: Effort normal and breath sounds normal. No respiratory distress.   Musculoskeletal:        Left hip: Normal. She exhibits normal range of motion, normal strength, no tenderness, no bony tenderness and no deformity.   Neurovascularly intact   Neurological: She is alert and oriented to person, place, and time.   Skin: Skin is warm.   Psychiatric: Affect normal. "     Assessment:  1. Pain of left hip joint  - X-Ray Hip 2 View Left; Future  - naproxen (EC NAPROSYN) 500 MG EC tablet; Take 1 tablet (500 mg total) by mouth 2 (two) times daily with meals.  Dispense: 20 tablet; Refill: 0    2. Sciatica of left side  - naproxen (EC NAPROSYN) 500 MG EC tablet; Take 1 tablet (500 mg total) by mouth 2 (two) times daily with meals.  Dispense: 20 tablet; Refill: 0    3. Bursitis, unspecified site      Plan:  Patient Instructions   Naproxen sent to pharmacy for pain.  Follow up with physical therapy for treatment.  Rest of the leg when at home.  Heating pad to back as needed for 20 minute intervals.  Report to ER with new or worsening symptoms.

## 2020-02-01 NOTE — PATIENT INSTRUCTIONS
Naproxen sent to pharmacy for pain.  Follow up with physical therapy for treatment.  Rest of the leg when at home.  Heating pad to back as needed for 20 minute intervals.  Report to ER with new or worsening symptoms.

## 2021-05-06 ENCOUNTER — PATIENT MESSAGE (OUTPATIENT)
Dept: RESEARCH | Facility: HOSPITAL | Age: 51
End: 2021-05-06

## 2021-09-15 ENCOUNTER — TELEPHONE (OUTPATIENT)
Dept: HEPATOLOGY | Facility: CLINIC | Age: 51
End: 2021-09-15

## 2021-09-16 ENCOUNTER — OFFICE VISIT (OUTPATIENT)
Dept: GASTROENTEROLOGY | Facility: CLINIC | Age: 51
End: 2021-09-16
Payer: MEDICAID

## 2021-09-16 VITALS
WEIGHT: 172.06 LBS | DIASTOLIC BLOOD PRESSURE: 80 MMHG | HEART RATE: 96 BPM | OXYGEN SATURATION: 98 % | SYSTOLIC BLOOD PRESSURE: 150 MMHG | BODY MASS INDEX: 30.49 KG/M2 | HEIGHT: 63 IN

## 2021-09-16 DIAGNOSIS — R13.19 ESOPHAGEAL DYSPHAGIA: Primary | ICD-10-CM

## 2021-09-16 DIAGNOSIS — K21.9 GASTROESOPHAGEAL REFLUX DISEASE, UNSPECIFIED WHETHER ESOPHAGITIS PRESENT: ICD-10-CM

## 2021-09-16 PROCEDURE — 99999 PR PBB SHADOW E&M-EST. PATIENT-LVL III: CPT | Mod: PBBFAC,,, | Performed by: PHYSICIAN ASSISTANT

## 2021-09-16 PROCEDURE — 99204 PR OFFICE/OUTPT VISIT, NEW, LEVL IV, 45-59 MIN: ICD-10-PCS | Mod: S$PBB,,, | Performed by: PHYSICIAN ASSISTANT

## 2021-09-16 PROCEDURE — 99213 OFFICE O/P EST LOW 20 MIN: CPT | Mod: PBBFAC | Performed by: PHYSICIAN ASSISTANT

## 2021-09-16 PROCEDURE — 99999 PR PBB SHADOW E&M-EST. PATIENT-LVL III: ICD-10-PCS | Mod: PBBFAC,,, | Performed by: PHYSICIAN ASSISTANT

## 2021-09-16 PROCEDURE — 99204 OFFICE O/P NEW MOD 45 MIN: CPT | Mod: S$PBB,,, | Performed by: PHYSICIAN ASSISTANT

## 2021-09-16 RX ORDER — PANTOPRAZOLE SODIUM 40 MG/1
40 TABLET, DELAYED RELEASE ORAL DAILY
COMMUNITY
End: 2021-10-19 | Stop reason: SDUPTHER

## 2021-09-16 RX ORDER — GABAPENTIN 300 MG/1
300 CAPSULE ORAL 2 TIMES DAILY
COMMUNITY
End: 2024-02-07

## 2021-09-20 ENCOUNTER — ANESTHESIA (OUTPATIENT)
Dept: ENDOSCOPY | Facility: HOSPITAL | Age: 51
End: 2021-09-20
Payer: MEDICAID

## 2021-09-20 ENCOUNTER — ANESTHESIA EVENT (OUTPATIENT)
Dept: ENDOSCOPY | Facility: HOSPITAL | Age: 51
End: 2021-09-20
Payer: MEDICAID

## 2021-09-20 ENCOUNTER — HOSPITAL ENCOUNTER (OUTPATIENT)
Facility: HOSPITAL | Age: 51
Discharge: HOME | End: 2021-09-20
Attending: INTERNAL MEDICINE | Admitting: INTERNAL MEDICINE
Payer: MEDICAID

## 2021-09-20 DIAGNOSIS — R13.12 OROPHARYNGEAL DYSPHAGIA: Primary | ICD-10-CM

## 2021-09-20 LAB — POCT GLUCOSE: 178 MG/DL (ref 70–110)

## 2021-09-20 PROCEDURE — 00731 ANES UPR GI NDSC PX NOS: CPT | Performed by: INTERNAL MEDICINE

## 2021-09-20 PROCEDURE — 63600175 PHARM REV CODE 636 W HCPCS: Performed by: STUDENT IN AN ORGANIZED HEALTH CARE EDUCATION/TRAINING PROGRAM

## 2021-09-20 PROCEDURE — 88312 SPECIAL STAINS GROUP 1: CPT | Performed by: PATHOLOGY

## 2021-09-20 PROCEDURE — 43239 EGD BIOPSY SINGLE/MULTIPLE: CPT | Mod: ,,, | Performed by: INTERNAL MEDICINE

## 2021-09-20 PROCEDURE — 27200946 HC BRUSH, CYTOLOGY: Performed by: INTERNAL MEDICINE

## 2021-09-20 PROCEDURE — 88112 PR  CYTOPATH, CELL ENHANCE TECH: ICD-10-PCS | Mod: 26,,, | Performed by: PATHOLOGY

## 2021-09-20 PROCEDURE — 43239 EGD BIOPSY SINGLE/MULTIPLE: CPT | Performed by: INTERNAL MEDICINE

## 2021-09-20 PROCEDURE — 88112 CYTOPATH CELL ENHANCE TECH: CPT | Performed by: PATHOLOGY

## 2021-09-20 PROCEDURE — 88312 PR  SPECIAL STAINS,GROUP I: ICD-10-PCS | Mod: 26,,, | Performed by: PATHOLOGY

## 2021-09-20 PROCEDURE — 88112 CYTOPATH CELL ENHANCE TECH: CPT | Mod: 26,,, | Performed by: PATHOLOGY

## 2021-09-20 PROCEDURE — 43239 PR EGD, FLEX, W/BIOPSY, SGL/MULTI: ICD-10-PCS | Mod: ,,, | Performed by: INTERNAL MEDICINE

## 2021-09-20 PROCEDURE — 88305 TISSUE EXAM BY PATHOLOGIST: ICD-10-PCS | Mod: 26,,, | Performed by: PATHOLOGY

## 2021-09-20 PROCEDURE — 25000003 PHARM REV CODE 250: Performed by: STUDENT IN AN ORGANIZED HEALTH CARE EDUCATION/TRAINING PROGRAM

## 2021-09-20 PROCEDURE — 88312 SPECIAL STAINS GROUP 1: CPT | Mod: 26,,, | Performed by: PATHOLOGY

## 2021-09-20 PROCEDURE — 37000008 HC ANESTHESIA 1ST 15 MINUTES: Performed by: INTERNAL MEDICINE

## 2021-09-20 PROCEDURE — 27201012 HC FORCEPS, HOT/COLD, DISP: Performed by: INTERNAL MEDICINE

## 2021-09-20 PROCEDURE — 88305 TISSUE EXAM BY PATHOLOGIST: CPT | Performed by: PATHOLOGY

## 2021-09-20 PROCEDURE — 88305 TISSUE EXAM BY PATHOLOGIST: CPT | Mod: 26,,, | Performed by: PATHOLOGY

## 2021-09-20 RX ORDER — SODIUM CHLORIDE, SODIUM LACTATE, POTASSIUM CHLORIDE, CALCIUM CHLORIDE 600; 310; 30; 20 MG/100ML; MG/100ML; MG/100ML; MG/100ML
INJECTION, SOLUTION INTRAVENOUS CONTINUOUS
Status: DISCONTINUED | OUTPATIENT
Start: 2021-09-20 | End: 2021-09-20 | Stop reason: HOSPADM

## 2021-09-20 RX ORDER — PROPOFOL 10 MG/ML
VIAL (ML) INTRAVENOUS
Status: DISCONTINUED | OUTPATIENT
Start: 2021-09-20 | End: 2021-09-20

## 2021-09-20 RX ORDER — SODIUM CHLORIDE, SODIUM LACTATE, POTASSIUM CHLORIDE, CALCIUM CHLORIDE 600; 310; 30; 20 MG/100ML; MG/100ML; MG/100ML; MG/100ML
INJECTION, SOLUTION INTRAVENOUS CONTINUOUS PRN
Status: DISCONTINUED | OUTPATIENT
Start: 2021-09-20 | End: 2021-09-20

## 2021-09-20 RX ORDER — LIDOCAINE HYDROCHLORIDE 10 MG/ML
INJECTION, SOLUTION EPIDURAL; INFILTRATION; INTRACAUDAL; PERINEURAL
Status: DISCONTINUED | OUTPATIENT
Start: 2021-09-20 | End: 2021-09-20

## 2021-09-20 RX ORDER — FENOFIBRATE 145 MG/1
48 TABLET, FILM COATED ORAL DAILY
COMMUNITY
End: 2023-02-14 | Stop reason: SDUPTHER

## 2021-09-20 RX ADMIN — PROPOFOL 50 MG: 10 INJECTION, EMULSION INTRAVENOUS at 10:09

## 2021-09-20 RX ADMIN — SODIUM CHLORIDE, SODIUM LACTATE, POTASSIUM CHLORIDE, AND CALCIUM CHLORIDE: 600; 310; 30; 20 INJECTION, SOLUTION INTRAVENOUS at 10:09

## 2021-09-20 RX ADMIN — PROPOFOL 100 MG: 10 INJECTION, EMULSION INTRAVENOUS at 10:09

## 2021-09-20 RX ADMIN — LIDOCAINE HYDROCHLORIDE 50 MG: 10 INJECTION, SOLUTION EPIDURAL; INFILTRATION; INTRACAUDAL; PERINEURAL at 10:09

## 2021-09-22 LAB
FINAL PATHOLOGIC DIAGNOSIS: NORMAL
GROSS: NORMAL
Lab: NORMAL

## 2021-09-23 VITALS
BODY MASS INDEX: 31.65 KG/M2 | OXYGEN SATURATION: 95 % | SYSTOLIC BLOOD PRESSURE: 120 MMHG | TEMPERATURE: 97 F | HEART RATE: 74 BPM | DIASTOLIC BLOOD PRESSURE: 72 MMHG | WEIGHT: 172 LBS | HEIGHT: 62 IN | RESPIRATION RATE: 16 BRPM

## 2021-09-24 LAB
FINAL PATHOLOGIC DIAGNOSIS: NORMAL
Lab: NORMAL

## 2021-10-02 DIAGNOSIS — B37.81 CANDIDA ESOPHAGITIS: Primary | ICD-10-CM

## 2021-10-02 RX ORDER — FLUCONAZOLE 100 MG/1
100 TABLET ORAL DAILY
Qty: 14 TABLET | Refills: 0 | Status: SHIPPED | OUTPATIENT
Start: 2021-10-02 | End: 2021-10-16

## 2021-10-19 ENCOUNTER — OFFICE VISIT (OUTPATIENT)
Dept: GASTROENTEROLOGY | Facility: CLINIC | Age: 51
End: 2021-10-19
Payer: MEDICAID

## 2021-10-19 VITALS
HEART RATE: 86 BPM | OXYGEN SATURATION: 97 % | DIASTOLIC BLOOD PRESSURE: 76 MMHG | SYSTOLIC BLOOD PRESSURE: 140 MMHG | HEIGHT: 62 IN | BODY MASS INDEX: 31.46 KG/M2 | WEIGHT: 170.94 LBS

## 2021-10-19 DIAGNOSIS — K21.00 GASTROESOPHAGEAL REFLUX DISEASE WITH ESOPHAGITIS WITHOUT HEMORRHAGE: Primary | ICD-10-CM

## 2021-10-19 DIAGNOSIS — B37.81 CANDIDA INFECTION, ESOPHAGEAL: ICD-10-CM

## 2021-10-19 PROCEDURE — 99999 PR PBB SHADOW E&M-EST. PATIENT-LVL IV: CPT | Mod: PBBFAC,,, | Performed by: PHYSICIAN ASSISTANT

## 2021-10-19 PROCEDURE — 99213 PR OFFICE/OUTPT VISIT, EST, LEVL III, 20-29 MIN: ICD-10-PCS | Mod: S$PBB,,, | Performed by: PHYSICIAN ASSISTANT

## 2021-10-19 PROCEDURE — 99214 OFFICE O/P EST MOD 30 MIN: CPT | Mod: PBBFAC | Performed by: PHYSICIAN ASSISTANT

## 2021-10-19 PROCEDURE — 99999 PR PBB SHADOW E&M-EST. PATIENT-LVL IV: ICD-10-PCS | Mod: PBBFAC,,, | Performed by: PHYSICIAN ASSISTANT

## 2021-10-19 PROCEDURE — 99213 OFFICE O/P EST LOW 20 MIN: CPT | Mod: S$PBB,,, | Performed by: PHYSICIAN ASSISTANT

## 2021-10-19 RX ORDER — PANTOPRAZOLE SODIUM 40 MG/1
40 TABLET, DELAYED RELEASE ORAL DAILY
Qty: 30 TABLET | Refills: 3 | Status: SHIPPED | OUTPATIENT
Start: 2021-10-19

## 2021-12-27 ENCOUNTER — TELEPHONE (OUTPATIENT)
Dept: SPORTS MEDICINE | Facility: CLINIC | Age: 51
End: 2021-12-27
Payer: MEDICAID

## 2021-12-28 ENCOUNTER — TELEPHONE (OUTPATIENT)
Dept: SPORTS MEDICINE | Facility: CLINIC | Age: 51
End: 2021-12-28
Payer: MEDICAID

## 2021-12-28 ENCOUNTER — TELEPHONE (OUTPATIENT)
Dept: GASTROENTEROLOGY | Facility: CLINIC | Age: 51
End: 2021-12-28
Payer: MEDICAID

## 2021-12-28 ENCOUNTER — TELEPHONE (OUTPATIENT)
Dept: PODIATRY | Facility: CLINIC | Age: 51
End: 2021-12-28
Payer: MEDICAID

## 2021-12-28 ENCOUNTER — HOSPITAL ENCOUNTER (EMERGENCY)
Facility: HOSPITAL | Age: 51
Discharge: HOME | End: 2021-12-28
Attending: EMERGENCY MEDICINE
Payer: MEDICAID

## 2021-12-28 VITALS
HEART RATE: 82 BPM | DIASTOLIC BLOOD PRESSURE: 74 MMHG | OXYGEN SATURATION: 95 % | TEMPERATURE: 98 F | BODY MASS INDEX: 31.83 KG/M2 | SYSTOLIC BLOOD PRESSURE: 158 MMHG | WEIGHT: 174 LBS

## 2021-12-28 DIAGNOSIS — S92.302G: Primary | ICD-10-CM

## 2021-12-28 PROCEDURE — 99281 EMR DPT VST MAYX REQ PHY/QHP: CPT

## 2021-12-28 NOTE — ED PROVIDER NOTES
"Encounter Date: 2021       History     Chief Complaint   Patient presents with    Foot Injury     Pt has broken bones in her foot. She needs her foot evaluated and treatment for the pain.     Pt presents with broken foot requesting referral to podiatry.  Left foot broken on .  Denies any new injury.  No distress noted the son.        Review of patient's allergies indicates:   Allergen Reactions    Morphine      Reports "headache"     Past Medical History:   Diagnosis Date    Anxiety     Depression     Diabetes mellitus type II     Diverticulitis     Hyperlipidemia     Hypertension      Past Surgical History:   Procedure Laterality Date     SECTION      three    ESOPHAGOGASTRODUODENOSCOPY N/A 2021    Procedure: EGD (ESOPHAGOGASTRODUODENOSCOPY);  Surgeon: Prerna Poole MD;  Location[facility];  Service: Endoscopy;  Laterality: N/A;    HYSTERECTOMY      2005    LIPOMA RESECTION       Family History   Problem Relation Age of Onset    Heart disease Mother         CAD    Diabetes Mother     Hypertension Mother     Hyperlipidemia Mother     COPD Mother     COPD Father     Diabetes Father     Hyperlipidemia Father     Hypertension Father     Heart disease Father         MI    Colon cancer Neg Hx      Social History     Tobacco Use    Smoking status: Never Smoker    Smokeless tobacco: Never Used   Substance Use Topics    Alcohol use: No     Comment: occaisionally    Drug use: No     Review of Systems   Constitutional: Negative for fever.   HENT: Negative for sore throat.    Respiratory: Negative for shortness of breath.    Cardiovascular: Negative for chest pain.   Gastrointestinal: Negative for nausea.   Genitourinary: Negative for dysuria.   Musculoskeletal: Positive for arthralgias (Left foot). Negative for back pain.   Skin: Negative for rash.   Neurological: Negative for weakness.   Hematological: Does not bruise/bleed easily.       Physical Exam     Initial " Vitals [12/28/21 1408]   BP Pulse Resp Temp SpO2   (!) 158/74 82 -- 97.9 °F (36.6 °C) 95 %      MAP       --         Physical Exam    Nursing note and vitals reviewed.  Constitutional: She appears well-developed and well-nourished.   HENT:   Head: Normocephalic and atraumatic.   Eyes: EOM are normal. Pupils are equal, round, and reactive to light.   Neck: Neck supple.   Normal range of motion.  Cardiovascular: Normal rate, regular rhythm, normal heart sounds and intact distal pulses.   Pulmonary/Chest: Breath sounds normal.   Abdominal: Abdomen is soft. Bowel sounds are normal.   Musculoskeletal:         General: Tenderness (Left foot) present. Normal range of motion.      Cervical back: Normal range of motion and neck supple.     Neurological: She is alert and oriented to person, place, and time. She has normal strength and normal reflexes.   Skin: Skin is warm and dry.         ED Course   Procedures  Labs Reviewed - No data to display       Imaging Results    None          Medications - No data to display  Medical Decision Making:   ED Management:  Patient given referral to podiatrist.  Patient instructed to return to the ER with any worsening symptoms.  No distress noted at this time.                      Clinical Impression:   Final diagnoses:  [S92.302G] Closed avulsion fracture of metatarsal bone of left foot with delayed healing, subsequent encounter (Primary)          ED Disposition Condition    Discharge Stable        ED Prescriptions     None        Follow-up Information     Follow up With Specialties Details Why Contact Info    Homero Clark DPM Podiatry In 2 days  [address] [place] [address] [address] FUNG [id number]  [phone]             Saravanan Louise NP  12/31/21 2040

## 2021-12-29 DIAGNOSIS — M79.672 LEFT FOOT PAIN: Primary | ICD-10-CM

## 2021-12-30 ENCOUNTER — HOSPITAL ENCOUNTER (EMERGENCY)
Facility: HOSPITAL | Age: 51
Discharge: HOME | End: 2021-12-30
Attending: EMERGENCY MEDICINE
Payer: MEDICAID

## 2021-12-30 VITALS
TEMPERATURE: 98 F | BODY MASS INDEX: 30.83 KG/M2 | DIASTOLIC BLOOD PRESSURE: 76 MMHG | SYSTOLIC BLOOD PRESSURE: 140 MMHG | HEIGHT: 62 IN | RESPIRATION RATE: 16 BRPM | WEIGHT: 167.56 LBS | OXYGEN SATURATION: 99 % | HEART RATE: 99 BPM

## 2021-12-30 DIAGNOSIS — N93.9 VAGINAL BLEEDING: ICD-10-CM

## 2021-12-30 DIAGNOSIS — S39.93XA VAGINAL TRAUMA, INITIAL ENCOUNTER: Primary | ICD-10-CM

## 2021-12-30 PROCEDURE — 63600175 PHARM REV CODE 636 W HCPCS: Performed by: EMERGENCY MEDICINE

## 2021-12-30 PROCEDURE — 99285 EMERGENCY DEPT VISIT HI MDM: CPT | Mod: 25

## 2021-12-30 PROCEDURE — 96372 THER/PROPH/DIAG INJ SC/IM: CPT

## 2021-12-30 PROCEDURE — 25000003 PHARM REV CODE 250: Performed by: EMERGENCY MEDICINE

## 2021-12-30 RX ORDER — HYDROCODONE BITARTRATE AND ACETAMINOPHEN 5; 325 MG/1; MG/1
1 TABLET ORAL EVERY 4 HOURS PRN
Qty: 15 TABLET | Refills: 0 | Status: SHIPPED | OUTPATIENT
Start: 2021-12-30 | End: 2022-01-04

## 2021-12-30 RX ORDER — ONDANSETRON 4 MG/1
4 TABLET, ORALLY DISINTEGRATING ORAL
Status: COMPLETED | OUTPATIENT
Start: 2021-12-30 | End: 2021-12-30

## 2021-12-30 RX ORDER — HYDROMORPHONE HYDROCHLORIDE 1 MG/ML
1 INJECTION, SOLUTION INTRAMUSCULAR; INTRAVENOUS; SUBCUTANEOUS
Status: COMPLETED | OUTPATIENT
Start: 2021-12-30 | End: 2021-12-30

## 2021-12-30 RX ADMIN — HYDROMORPHONE HYDROCHLORIDE 1 MG: 1 INJECTION, SOLUTION INTRAMUSCULAR; INTRAVENOUS; SUBCUTANEOUS at 07:12

## 2021-12-30 RX ADMIN — ONDANSETRON 4 MG: 4 TABLET, ORALLY DISINTEGRATING ORAL at 07:12

## 2021-12-30 NOTE — Clinical Note
"Yue "Chele Serrato was seen and treated in our emergency department on 12/30/2021.  She may return to work on 01/03/2022.       If you have any questions or concerns, please don't hesitate to call.      Scooby Kelly MD"

## 2021-12-30 NOTE — ED PROVIDER NOTES
"SCRIBE #1 NOTE: I, Alicja Muhammad, am scribing for, and in the presence of, Scooby Kelly MD. I have scribed the entire note.      History      Chief Complaint   Patient presents with    Vaginal Bleeding     Pt CO vag bleeding x1 day. Pt hysterectomy approx 15 yrs prior       Review of patient's allergies indicates:   Allergen Reactions    Morphine      Reports "headache"        HPI   HPI    2021, 7:40 AM   History obtained from the patient      History of Present Illness: Yue Serrato is a 51 y.o. female patient with PMHx of DM, HLD, HTN, and diverticulitis who presents to the Emergency Department for vaginal bleeding which onset gradually 24 hours ago. Pt has had significant bleeding and has soil 3 pads in the last 10 hours. Pt has also had suprapubic abdominal pain. The pain does not radiate. Symptoms are constant and moderate in severity. No mitigating or exacerbating factors reported. Patient denies any fever, chills, SOB, CP, N/V/D, HA, numbness, weakness,flank pain, dysuria, hematuria, rectal bleeding, hematochezia, vaginal discharge , and all other sxs at this time. Pt states have placed an object within the vagina 2-3 weeks ago and has not been removed. Prior to the onset of sxs pt was sexual activity and partner notice an object placed inside. Pt has a hysterectomy 15 years ago. No further complaints or concerns at this time.        Arrival mode: Personal vehicle     PCP: MARIYA Altamirano       Past Medical History:  Past Medical History:   Diagnosis Date    Anxiety     Depression     Diabetes mellitus type II     Diverticulitis     Hyperlipidemia     Hypertension        Past Surgical History:  Past Surgical History:   Procedure Laterality Date     SECTION      three    ESOPHAGOGASTRODUODENOSCOPY N/A 2021    Procedure: EGD (ESOPHAGOGASTRODUODENOSCOPY);  Surgeon: Prerna Poole MD;  Location: [facility];  Service: Endoscopy;  Laterality: N/A;    HYSTERECTOMY      " 11/2005    LIPOMA RESECTION           Family History:  Family History   Problem Relation Age of Onset    Heart disease Mother         CAD    Diabetes Mother     Hypertension Mother     Hyperlipidemia Mother     COPD Mother     COPD Father     Diabetes Father     Hyperlipidemia Father     Hypertension Father     Heart disease Father         MI    Colon cancer Neg Hx        Social History:  Social History     Tobacco Use    Smoking status: Never Smoker    Smokeless tobacco: Never Used   Substance and Sexual Activity    Alcohol use: No     Comment: occaisionally    Drug use: No    Sexual activity: Not Currently     Partners: Male       ROS   Review of Systems   Constitutional: Negative for chills and fever.   HENT: Negative for congestion and sore throat.    Eyes: Negative for visual disturbance.   Respiratory: Negative for cough and shortness of breath.    Cardiovascular: Negative for chest pain.   Gastrointestinal: Positive for abdominal pain (suprapubic). Negative for anal bleeding, blood in stool, diarrhea, nausea and vomiting.   Genitourinary: Positive for vaginal bleeding and vaginal pain. Negative for dysuria, flank pain and hematuria.   Musculoskeletal: Negative for back pain.   Skin: Negative for rash.   Neurological: Negative for dizziness, weakness, numbness and headaches.   Hematological: Does not bruise/bleed easily.       Physical Exam      Initial Vitals [12/30/21 0423]   BP Pulse Resp Temp SpO2   (!) 184/82 103 12 97.7 °F (36.5 °C) 97 %      MAP       --          Physical Exam  Nursing Notes and Vital Signs Reviewed.  Constitutional: Patient is in no acute distress. Well-developed and well-nourished.  Head: Atraumatic. Normocephalic.  Eyes: PERRL. EOM intact. Conjunctivae are not pale. No scleral icterus.  ENT: Mucous membranes are moist. Oropharynx is clear and symmetric.    Neck: Supple. Full ROM.   Cardiovascular: Regular rate. Regular rhythm. No murmurs, rubs, or gallops. Distal  "pulses are 2+ and symmetric.  Pulmonary/Chest: No respiratory distress. Clear to auscultation bilaterally. No wheezing or rales.  Abdominal: Soft and non-distended.  There is no tenderness.  No rebound, guarding, or rigidity.Genitourinary: No CVA tenderness  Pelvic: A female chaperone was present for this examination. Nl external inspection. No lesions or abnormalities were visible on the labia majora or minora. There is moderate amount of blood in the vaginal vault. Moderate sized blood clot removed. No foreign body palpated or visualized upon speculum exam. No discharge.   Musculoskeletal: Moves all extremities. No obvious deformities. No edema.   Skin: Warm and dry.  Neurological:  Alert, awake, and appropriate.  Normal speech.  No acute focal neurological deficits are appreciated.  Psychiatric: Normal affect. Good eye contact. Appropriate in content.    ED Course    Procedures  ED Vital Signs:  Vitals:    12/30/21 0423 12/30/21 0700 12/30/21 0745 12/30/21 0800   BP: (!) 184/82 (!) 176/80  (!) 146/85   Pulse: 103 101  98   Resp: 12 20 20 18   Temp: 97.7 °F (36.5 °C)      TempSrc: Oral      SpO2: 97% 98%  100%   Weight: 76 kg (167 lb 8.8 oz)      Height: 5' 2" (1.575 m)       12/30/21 0900   BP: (!) 140/76   Pulse: 99   Resp: 16   Temp: 98.1 °F (36.7 °C)   TempSrc: Oral   SpO2: 99%   Weight:    Height:        Abnormal Lab Results:  Labs Reviewed - No data to display             Imaging Results:  Imaging Results          CT Abdomen Pelvis  Without Contrast (Final result)  Result time 12/30/21 08:35:48    Final result by Esvin Perrin MD (12/30/21 08:35:48)                 Impression:      No foreign body is seen within the vagina.    All CT scans at this facility use dose modulation, iterative reconstruction, and/or weight based dosing when appropriate to reduce radiation dose to as low as reasonable achievable.      Electronically signed by: Esvin Perrin MD  Date:    12/30/2021  Time:    08:35             " Narrative:    EXAMINATION:  CT ABDOMEN PELVIS WITHOUT CONTRAST    CLINICAL HISTORY:  possible foreign body deep vaginal placement, not seen on speculum exam;    TECHNIQUE:  Low dose axial images, sagittal and coronal reformations were obtained from the lung bases to the pubic symphysis.  Oral contrast was not administered.    COMPARISON:  09/21/2016    FINDINGS:  Heart: Normal size. No effusion.    Lung Bases: Clear.    Liver: Normal size.  Decreased attenuation liver consistent with hepatic steatosis.  Multiple small granuloma are noted..  No focal lesions.    Gallbladder: No calcified gallstones.    Bile Ducts: No dilatation.    Pancreas: No obvious mass. No peripancreatic fat stranding.    Spleen: Normal.    Adrenals: Normal.    Kidneys/Ureters: No mass, hydroureteronephrosis, or nephroureterolithiasis.    Bladder: Mild nonspecific wall thickening.  Air within the bladder thought to reflect instrumentation.    Reproductive organs: Post hysterectomy.    GI Tract/Mesentery: No evidence of bowel obstruction or inflammation.  Diverticulosis throughout the large bowel.  Mild constipation normal appendix.    Peritoneal Space: No ascites or free air.    Retroperitoneum: No significant adenopathy.    Abdominal wall: Small fat containing umbilical hernia.    Vasculature: No aneurysm. Aorta demonstrates atherosclerotic disease.    Bones: No acute fracture.  Mild degenerative changes lower lumbar spine.  No suspicious lytic or sclerotic lesions.                                            The Emergency Provider reviewed the vital signs and test results, which are outlined above.    ED Discussion     9:01 AM: Reassessed pt at this time.   Discussed with pt all pertinent ED information and results. Discussed pt dx and plan of tx. Gave pt all f/u and return to the ED instructions. All questions and concerns were addressed at this time. Pt expresses understanding of information and instructions, and is comfortable with plan to  discharge. Pt is stable for discharge.    I discussed with patient and/or family/caretaker that evaluation in the ED does not suggest any emergent or life threatening medical conditions requiring immediate intervention beyond what was provided in the ED, and I believe patient is safe for discharge.  Regardless, an unremarkable evaluation in the ED does not preclude the development or presence of a serious of life threatening condition. As such, patient was instructed to return immediately for any worsening or change in current symptoms.           ED Medication(s):  Medications   HYDROmorphone injection 1 mg (1 mg Intramuscular Given 12/30/21 0745)   ondansetron disintegrating tablet 4 mg (4 mg Oral Given 12/30/21 0744)     Discharge Medication List as of 12/30/2021  8:44 AM          Follow-up Information     Inocencia Garcia MD. Schedule an appointment as soon as possible for a visit in 2 days.    Specialties: Obstetrics, Obstetrics and Gynecology  Contact information:  [address]  [place] [address]  [phone]             [facility] - Emergency Dept..    Specialty: Emergency Medicine  Why: As needed, If symptoms worsen  Contact information:  [address] [facility] [id number] [phone]                         Medical Decision Making    Medical Decision Making:   Clinical Tests:   Radiological Study: Ordered and Reviewed           Scribe Attestation:   Scribe #1: I performed the above scribed service and the documentation accurately describes the services I performed. I attest to the accuracy of the note.    Attending:   Physician Attestation Statement for Scribe #1: I, Scooby Kelly MD, personally performed the services described in this documentation, as scribed by Alicja Muhammad, in my presence, and it is both accurate and complete.          Clinical Impression       ICD-10-CM ICD-9-CM   1. Vaginal trauma, initial encounter  S39.93XA 959.14   2. Vaginal bleeding  N93.9  623.8       Disposition:   Disposition: Discharged  Condition: Stable         Scooby Kelly MD  12/31/21 0804

## 2022-01-12 ENCOUNTER — PES CALL (OUTPATIENT)
Dept: ADMINISTRATIVE | Facility: CLINIC | Age: 52
End: 2022-01-12
Payer: MEDICAID

## 2022-01-21 ENCOUNTER — PES CALL (OUTPATIENT)
Dept: ADMINISTRATIVE | Facility: CLINIC | Age: 52
End: 2022-01-21
Payer: MEDICAID

## 2022-02-16 ENCOUNTER — HOSPITAL ENCOUNTER (EMERGENCY)
Facility: HOSPITAL | Age: 52
Discharge: HOME | End: 2022-02-16
Attending: EMERGENCY MEDICINE
Payer: MEDICAID

## 2022-02-16 VITALS
SYSTOLIC BLOOD PRESSURE: 142 MMHG | RESPIRATION RATE: 20 BRPM | OXYGEN SATURATION: 99 % | BODY MASS INDEX: 29.84 KG/M2 | HEART RATE: 74 BPM | HEIGHT: 63 IN | DIASTOLIC BLOOD PRESSURE: 67 MMHG | WEIGHT: 168.44 LBS | TEMPERATURE: 98 F

## 2022-02-16 DIAGNOSIS — R19.7 NAUSEA VOMITING AND DIARRHEA: Primary | ICD-10-CM

## 2022-02-16 DIAGNOSIS — R11.2 NAUSEA VOMITING AND DIARRHEA: Primary | ICD-10-CM

## 2022-02-16 DIAGNOSIS — K57.90 DIVERTICULOSIS: ICD-10-CM

## 2022-02-16 DIAGNOSIS — R10.9 ABDOMINAL PAIN: ICD-10-CM

## 2022-02-16 LAB
ALBUMIN SERPL BCP-MCNC: 4 G/DL (ref 3.5–5.2)
ALP SERPL-CCNC: 59 U/L (ref 55–135)
ALT SERPL W/O P-5'-P-CCNC: 21 U/L (ref 10–44)
ANION GAP SERPL CALC-SCNC: 8 MMOL/L (ref 8–16)
AST SERPL-CCNC: 20 U/L (ref 10–40)
BASOPHILS # BLD AUTO: 0.02 K/UL (ref 0–0.2)
BASOPHILS NFR BLD: 0.3 % (ref 0–1.9)
BILIRUB SERPL-MCNC: 0.5 MG/DL (ref 0.1–1)
BILIRUB UR QL STRIP: NEGATIVE
BUN SERPL-MCNC: 22 MG/DL (ref 6–20)
CALCIUM SERPL-MCNC: 9.5 MG/DL (ref 8.7–10.5)
CHLORIDE SERPL-SCNC: 108 MMOL/L (ref 95–110)
CLARITY UR: CLEAR
CO2 SERPL-SCNC: 25 MMOL/L (ref 23–29)
COLOR UR: YELLOW
CREAT SERPL-MCNC: 0.8 MG/DL (ref 0.5–1.4)
CTP QC/QA: YES
DIFFERENTIAL METHOD: ABNORMAL
EOSINOPHIL # BLD AUTO: 0.2 K/UL (ref 0–0.5)
EOSINOPHIL NFR BLD: 3 % (ref 0–8)
ERYTHROCYTE [DISTWIDTH] IN BLOOD BY AUTOMATED COUNT: 12.4 % (ref 11.5–14.5)
EST. GFR  (AFRICAN AMERICAN): >60 ML/MIN/1.73 M^2
EST. GFR  (NON AFRICAN AMERICAN): >60 ML/MIN/1.73 M^2
GLUCOSE SERPL-MCNC: 73 MG/DL (ref 70–110)
GLUCOSE UR QL STRIP: NEGATIVE
HCT VFR BLD AUTO: 39.6 % (ref 37–48.5)
HGB BLD-MCNC: 13.1 G/DL (ref 12–16)
HGB UR QL STRIP: ABNORMAL
IMM GRANULOCYTES # BLD AUTO: 0.06 K/UL (ref 0–0.04)
IMM GRANULOCYTES NFR BLD AUTO: 0.8 % (ref 0–0.5)
KETONES UR QL STRIP: NEGATIVE
LEUKOCYTE ESTERASE UR QL STRIP: NEGATIVE
LIPASE SERPL-CCNC: 60 U/L (ref 4–60)
LYMPHOCYTES # BLD AUTO: 2 K/UL (ref 1–4.8)
LYMPHOCYTES NFR BLD: 25.9 % (ref 18–48)
MCH RBC QN AUTO: 29.2 PG (ref 27–31)
MCHC RBC AUTO-ENTMCNC: 33.1 G/DL (ref 32–36)
MCV RBC AUTO: 88 FL (ref 82–98)
MONOCYTES # BLD AUTO: 0.5 K/UL (ref 0.3–1)
MONOCYTES NFR BLD: 6.8 % (ref 4–15)
NEUTROPHILS # BLD AUTO: 4.9 K/UL (ref 1.8–7.7)
NEUTROPHILS NFR BLD: 63.2 % (ref 38–73)
NITRITE UR QL STRIP: NEGATIVE
NRBC BLD-RTO: 0 /100 WBC
PH UR STRIP: 6 [PH] (ref 5–8)
PLATELET # BLD AUTO: 300 K/UL (ref 150–450)
PMV BLD AUTO: 9.2 FL (ref 9.2–12.9)
POTASSIUM SERPL-SCNC: 4 MMOL/L (ref 3.5–5.1)
PROT SERPL-MCNC: 7.6 G/DL (ref 6–8.4)
PROT UR QL STRIP: NEGATIVE
RBC # BLD AUTO: 4.49 M/UL (ref 4–5.4)
SARS-COV-2 RDRP RESP QL NAA+PROBE: NEGATIVE
SODIUM SERPL-SCNC: 141 MMOL/L (ref 136–145)
SP GR UR STRIP: 1.02 (ref 1–1.03)
URN SPEC COLLECT METH UR: ABNORMAL
UROBILINOGEN UR STRIP-ACNC: NEGATIVE EU/DL
WBC # BLD AUTO: 7.69 K/UL (ref 3.9–12.7)

## 2022-02-16 PROCEDURE — 93005 ELECTROCARDIOGRAM TRACING: CPT

## 2022-02-16 PROCEDURE — 96360 HYDRATION IV INFUSION INIT: CPT

## 2022-02-16 PROCEDURE — 25000003 PHARM REV CODE 250: Performed by: EMERGENCY MEDICINE

## 2022-02-16 PROCEDURE — 93010 EKG 12-LEAD: ICD-10-PCS | Mod: ,,, | Performed by: INTERNAL MEDICINE

## 2022-02-16 PROCEDURE — 93010 ELECTROCARDIOGRAM REPORT: CPT | Mod: ,,, | Performed by: INTERNAL MEDICINE

## 2022-02-16 PROCEDURE — U0002 COVID-19 LAB TEST NON-CDC: HCPCS | Performed by: EMERGENCY MEDICINE

## 2022-02-16 PROCEDURE — 83690 ASSAY OF LIPASE: CPT | Performed by: EMERGENCY MEDICINE

## 2022-02-16 PROCEDURE — 85025 COMPLETE CBC W/AUTO DIFF WBC: CPT | Performed by: EMERGENCY MEDICINE

## 2022-02-16 PROCEDURE — 99285 EMERGENCY DEPT VISIT HI MDM: CPT | Mod: 25

## 2022-02-16 PROCEDURE — 80053 COMPREHEN METABOLIC PANEL: CPT | Performed by: EMERGENCY MEDICINE

## 2022-02-16 PROCEDURE — 81003 URINALYSIS AUTO W/O SCOPE: CPT | Performed by: EMERGENCY MEDICINE

## 2022-02-16 RX ORDER — ONDANSETRON HYDROCHLORIDE 4 MG/5ML
4 SOLUTION ORAL ONCE
Status: COMPLETED | OUTPATIENT
Start: 2022-02-16 | End: 2022-02-16

## 2022-02-16 RX ORDER — DICYCLOMINE HYDROCHLORIDE 20 MG/1
20 TABLET ORAL
Status: COMPLETED | OUTPATIENT
Start: 2022-02-16 | End: 2022-02-16

## 2022-02-16 RX ORDER — ONDANSETRON 4 MG/1
4 TABLET, ORALLY DISINTEGRATING ORAL EVERY 6 HOURS PRN
Qty: 12 TABLET | Refills: 0 | Status: SHIPPED | OUTPATIENT
Start: 2022-02-16 | End: 2023-02-02

## 2022-02-16 RX ORDER — DICYCLOMINE HYDROCHLORIDE 20 MG/1
20 TABLET ORAL 4 TIMES DAILY
Qty: 16 TABLET | Refills: 0 | Status: SHIPPED | OUTPATIENT
Start: 2022-02-16 | End: 2023-02-02

## 2022-02-16 RX ADMIN — SODIUM CHLORIDE 1000 ML: 0.9 INJECTION, SOLUTION INTRAVENOUS at 09:02

## 2022-02-16 RX ADMIN — DICYCLOMINE HYDROCHLORIDE 20 MG: 20 TABLET ORAL at 09:02

## 2022-02-16 RX ADMIN — ONDANSETRON 4 MG: 4 SOLUTION ORAL at 09:02

## 2022-02-16 NOTE — Clinical Note
"Yue "Yue" Ama was seen and treated in our emergency department on 2/16/2022.  She may return to work on 02/16/2022.       If you have any questions or concerns, please don't hesitate to call.      Fartun Song, DO"

## 2022-02-16 NOTE — DISCHARGE INSTRUCTIONS
Take frequent sips of Pedialyte, Powerade, Gatorade.  Popsicles.  Sublette diet, bananas, breads, rice.  Avoid red sauces, fruit juices, and dairy products as can irritate your stomach.  If drinking water, be sure to have something salty such as crackers to help restore electrolytes.  Return to emergency department for: Weakness, passing out, blood in stool, blood in vomit, fever, worsening abdominal pain, or other concerns.

## 2022-02-16 NOTE — Clinical Note
"Yue "Yue" Ama was seen and treated in our emergency department on 2/16/2022.  She may return to work on 02/17/2022.       If you have any questions or concerns, please don't hesitate to call.      Fartun Song, DO"

## 2022-02-16 NOTE — ED PROVIDER NOTES
"SCRIBE #1 NOTE: I, Monica Sepulveda, am scribing for, and in the presence of, Fartun Song DO. I have scribed the entire note.      History      Chief Complaint   Patient presents with    Abdominal Pain     Pt PMH diverticulosis. CO Abd pain since  becoming worse till now. Reports N/V/D since Sun       Review of patient's allergies indicates:   Allergen Reactions    Morphine      Reports "headache"        HPI   HPI    2022, 8:46 AM   History obtained from the patient      History of Present Illness: Yue Serrato is a 51 y.o. female patient with a PMHx of DM2, diverticulitis, HSV infection, HLD, and HTN who presents to the Emergency Department for generalized abdominal pain which onset gradually 2 days PTA. She describes the pain as "gas pains." The pt reports that her pain is similar to when she was hospitalized for diverticulosis in . Symptoms are constant and moderate in severity. No mitigating or exacerbating factors reported. Associated sxs include N/V/D, fatigue, generalized myalgias. She reports that she has had 2 episodes of vomiting and 3 episodes of diarrhea since . Patient denies any hematemesis, blood in stool, fever, loss of taste, loss of smell, cough, CP, dysuria, urgency, SOB, weakness, headaches, and all other sxs at this time. Prior Tx includes Tylenol and Ibuprofen with no relief of sxs. The pt reports that she was on abx last week for a bladder infection, and she denies  sxs at this time. She denies EtOH usage. No further complaints or concerns at this time.         Arrival mode: Personal vehicle    PCP: MARIYA Altamirano       Past Medical History:  Past Medical History:   Diagnosis Date    Anxiety     Depression     Diabetes mellitus type II     Diverticulitis     HSV infection     Hyperlipidemia     Hypertension        Past Surgical History:  Past Surgical History:   Procedure Laterality Date     SECTION      x3    COLONOSCOPY  2019    normal "    ESOPHAGOGASTRODUODENOSCOPY N/A 9/20/2021    Procedure: EGD (ESOPHAGOGASTRODUODENOSCOPY);  Surgeon: Prerna Poole MD;  Location: [facility];  Service: Endoscopy;  Laterality: N/A;    HYSTERECTOMY  11/2005    with BSO, dysplasia    LIPOMA RESECTION           Family History:  Family History   Problem Relation Age of Onset    Heart disease Mother         CAD    Diabetes Mother     Hypertension Mother     Hyperlipidemia Mother     COPD Mother     COPD Father     Diabetes Father     Hyperlipidemia Father     Hypertension Father     Heart disease Father         MI    Colon cancer Neg Hx        Social History:  Social History     Tobacco Use    Smoking status: Never Smoker    Smokeless tobacco: Never Used   Substance and Sexual Activity    Alcohol use: No     Comment: occasionally    Drug use: No    Sexual activity: Not Currently     Partners: Male     Birth control/protection: See Surgical Hx     Comment: NIGEL/BSO       ROS   Review of Systems   Constitutional: Positive for fatigue. Negative for chills and fever.   HENT: Negative for sore throat.         (-) loss of taste  (-) loss of smell   Respiratory: Negative for cough and shortness of breath.    Cardiovascular: Negative for chest pain.   Gastrointestinal: Positive for abdominal pain (generalized), diarrhea, nausea and vomiting. Negative for blood in stool.        (-) hematemesis   Genitourinary: Negative for dysuria and urgency.   Musculoskeletal: Positive for myalgias (generalized). Negative for back pain.   Skin: Negative for rash.   Neurological: Negative for weakness and headaches.   Hematological: Does not bruise/bleed easily.   All other systems reviewed and are negative.    Physical Exam      Initial Vitals [02/16/22 0630]   BP Pulse Resp Temp SpO2   (!) 148/73 83 16 97.9 °F (36.6 °C) 97 %      MAP       --          Physical Exam  Nursing Notes and Vital Signs Reviewed.  Constitutional: Patient is in no acute distress. Well-developed  "and well-nourished.  Head: Atraumatic. Normocephalic.  Eyes: PERRL. EOM intact. Conjunctivae are not pale. No scleral icterus.  ENT: Mucous membranes are moist. Oropharynx is clear and symmetric.    Neck: Supple. Full ROM. No lymphadenopathy.  Cardiovascular: Regular rate. Regular rhythm. No murmurs, rubs, or gallops. Distal pulses are 2+ and symmetric.  Pulmonary/Chest: No respiratory distress. Clear to auscultation bilaterally. No wheezing or rales.  Abdominal: Soft and non-distended.  Negative McBurney's sign. Negative Murrieta's sign. Hyperactive bowel sounds.  Musculoskeletal: Moves all extremities. No obvious deformities. No edema.  Skin: Warm and dry.  Neurological:  Alert, awake, and appropriate.  Normal speech.  No acute focal neurological deficits are appreciated.  Psychiatric: Normal affect. Good eye contact. Appropriate in content.    ED Course    Procedures  ED Vital Signs:  Vitals:    02/16/22 0630 02/16/22 0830 02/16/22 1035   BP: (!) 148/73 (!) 152/70 131/67   Pulse: 83 80 74   Resp: 16 20 20   Temp: 97.9 °F (36.6 °C) 98 °F (36.7 °C)    TempSrc: Oral Oral    SpO2: 97% 100% 100%   Weight: 76.4 kg (168 lb 6.9 oz)     Height: 5' 3" (1.6 m)         Abnormal Lab Results:  Labs Reviewed   CBC W/ AUTO DIFFERENTIAL - Abnormal; Notable for the following components:       Result Value    Immature Granulocytes 0.8 (*)     Immature Grans (Abs) 0.06 (*)     All other components within normal limits   COMPREHENSIVE METABOLIC PANEL - Abnormal; Notable for the following components:    BUN 22 (*)     All other components within normal limits   URINALYSIS, REFLEX TO URINE CULTURE - Abnormal; Notable for the following components:    Occult Blood UA Trace (*)     All other components within normal limits    Narrative:     Specimen Source->Urine   LIPASE   SARS-COV-2 RDRP GENE    Narrative:     This test utilizes isothermal nucleic acid amplification   technology to detect the SARS-CoV-2 RdRp nucleic acid segment.   The " analytical sensitivity (limit of detection) is 125 genome   equivalents/mL.   A POSITIVE result implies infection with the SARS-CoV-2 virus;   the patient is presumed to be contagious.     A NEGATIVE result means that SARS-CoV-2 nucleic acids are not   present above the limit of detection. A NEGATIVE result should be   treated as presumptive. It does not rule out the possibility of   COVID-19 and should not be the sole basis for treatment decisions.   If COVID-19 is strongly suspected based on clinical and exposure   history, re-testing using an alternate molecular assay should be   considered.   This test is only for use under the Food and Drug   Administration s Emergency Use Authorization (EUA).   Commercial kits are provided by [employer].   Performance characteristics of the EUA have been independently   verified by [facility] Department of   Pathology and Laboratory Medicine.   _________________________________________________________________   The authorized Fact Sheet for Healthcare Providers and the authorized Fact   Sheet for Patients of the ID NOW COVID-19 are available on the FDA   website:     https://www.fda.gov/media/546211/download  https://www.fda.gov/media/003950/download              All Lab Results:  Results for orders placed or performed during the hospital encounter of 02/16/22   CBC W/ AUTO DIFFERENTIAL   Result Value Ref Range    WBC 7.69 3.90 - 12.70 K/uL    RBC 4.49 4.00 - 5.40 M/uL    Hemoglobin 13.1 12.0 - 16.0 g/dL    Hematocrit 39.6 37.0 - 48.5 %    MCV 88 82 - 98 fL    MCH 29.2 27.0 - 31.0 pg    MCHC 33.1 32.0 - 36.0 g/dL    RDW 12.4 11.5 - 14.5 %    Platelets 300 150 - 450 K/uL    MPV 9.2 9.2 - 12.9 fL    Immature Granulocytes 0.8 (H) 0.0 - 0.5 %    Gran # (ANC) 4.9 1.8 - 7.7 K/uL    Immature Grans (Abs) 0.06 (H) 0.00 - 0.04 K/uL    Lymph # 2.0 1.0 - 4.8 K/uL    Mono # 0.5 0.3 - 1.0 K/uL    Eos # 0.2 0.0 - 0.5 K/uL    Baso # 0.02 0.00 - 0.20 K/uL    nRBC 0 0 /100  WBC    Gran % 63.2 38.0 - 73.0 %    Lymph % 25.9 18.0 - 48.0 %    Mono % 6.8 4.0 - 15.0 %    Eosinophil % 3.0 0.0 - 8.0 %    Basophil % 0.3 0.0 - 1.9 %    Differential Method Automated    Comp. Metabolic Panel   Result Value Ref Range    Sodium 141 136 - 145 mmol/L    Potassium 4.0 3.5 - 5.1 mmol/L    Chloride 108 95 - 110 mmol/L    CO2 25 23 - 29 mmol/L    Glucose 73 70 - 110 mg/dL    BUN 22 (H) 6 - 20 mg/dL    Creatinine 0.8 0.5 - 1.4 mg/dL    Calcium 9.5 8.7 - 10.5 mg/dL    Total Protein 7.6 6.0 - 8.4 g/dL    Albumin 4.0 3.5 - 5.2 g/dL    Total Bilirubin 0.5 0.1 - 1.0 mg/dL    Alkaline Phosphatase 59 55 - 135 U/L    AST 20 10 - 40 U/L    ALT 21 10 - 44 U/L    Anion Gap 8 8 - 16 mmol/L    eGFR if African American >60 >60 mL/min/1.73 m^2    eGFR if non African American >60 >60 mL/min/1.73 m^2   Lipase   Result Value Ref Range    Lipase 60 4 - 60 U/L   Urinalysis, Reflex to Urine Culture Urine, Clean Catch    Specimen: Urine   Result Value Ref Range    Specimen UA Urine, Clean Catch     Color, UA Yellow Yellow, Straw, Naa    Appearance, UA Clear Clear    pH, UA 6.0 5.0 - 8.0    Specific Gravity, UA 1.025 1.005 - 1.030    Protein, UA Negative Negative    Glucose, UA Negative Negative    Ketones, UA Negative Negative    Bilirubin (UA) Negative Negative    Occult Blood UA Trace (A) Negative    Nitrite, UA Negative Negative    Urobilinogen, UA Negative <2.0 EU/dL    Leukocytes, UA Negative Negative   POCT COVID-19 Rapid Screening   Result Value Ref Range    POC Rapid COVID Negative Negative     Acceptable Yes          Imaging Results:  Imaging Results          CT Abdomen Pelvis  Without Contrast (Final result)  Result time 02/16/22 09:19:33    Final result by Vernon Cervantes MD (02/16/22 09:19:33)                 Impression:      Small volume fluid in the small bowel raising question of enteritis or impending diarrheal illness. Moderate large volume stool throughout the colon, sigmoid, and rectum.   Descending colonic and sigmoid diverticulosis without evidence of diverticulitis.      Electronically signed by: Vernon Cervantes  Date:    02/16/2022  Time:    09:19             Narrative:    EXAMINATION:  CT ABDOMEN PELVIS WITHOUT CONTRAST    CLINICAL HISTORY:  Nausea/vomiting;    TECHNIQUE:  Low dose axial images, sagittal and coronal reformations were obtained from the lung bases to the pubic symphysis.  Contrast was not administered.    All CT scans at this location are performed using dose modulation techniques as appropriate to a performed exam including the following: Automated exposure control; adjustment of the mA and/or kV according to patient size.    COMPARISON:  12/30/2021    FINDINGS:  Heart: Normal in size. No pericardial effusion.    Lung Bases: Well aerated, without consolidation or pleural fluid.    Liver: Calcified granulomas in the liver    Gallbladder: No calcified gallstones.    Bile Ducts: No evidence of dilated ducts.    Pancreas: No mass or peripancreatic fat stranding.    Spleen: Unremarkable.    Adrenals: Unremarkable.    Kidneys/ Ureters: Unremarkable.    Bladder: No evidence of wall thickening.    Reproductive organs: Uterus and ovaries surgically absent    GI Tract/Mesentery: Distal esophagus, stomach, duodenum, unremarkable.  Small volume fluid in the small bowel raising question of enteritis or impending diarrheal illness.  Moderate large volume stool throughout the colon, sigmoid, and rectum.  Descending colonic and sigmoid diverticulosis without evidence of diverticulitis..  Normal appendix.    Peritoneal Space: No ascites. No free air.    Retroperitoneum: No significant adenopathy.    Abdominal wall: Unremarkable.    Vasculature: No significant atherosclerosis or aneurysm.    Bones: No acute fracture.                               The EKG was ordered, reviewed, and independently interpreted by the ED provider.  Interpretation time: 7:48  Rate: 77 BPM  Rhythm: normal sinus  rhythm  Interpretation: Anteroseptal infarct (cited on or before 08-JAN-2016). No STEMI.  When compared to EKG performed on 6/14/2018, there are no significant changes.             The Emergency Provider reviewed the vital signs and test results, which are outlined above.    ED Discussion   Due to the hospital being at capacity, there may be a delay in seeing and treating patients.    10:03 AM: Re-evaluated pt. Pt is resting comfortably and is in no acute distress.  D/w pt all pertinent results. D/w pt any concerns expressed at this time. Answered all questions. Pt expresses understanding at this time.  Abdominal: Soft. No rebound. No guarding. No masses.    11:29 AM: Reassessed pt at this time. Discussed with pt all pertinent ED information and results. Discussed pt dx and plan of tx. Gave pt all f/u and return to the ED instructions. All questions and concerns were addressed at this time. Pt expresses understanding of information and instructions, and is comfortable with plan to discharge. Pt is stable for discharge.    I discussed with patient and/or family/caretaker that evaluation in the ED does not suggest any emergent or life threatening medical conditions requiring immediate intervention beyond what was provided in the ED, and I believe patient is safe for discharge.  Regardless, an unremarkable evaluation in the ED does not preclude the development or presence of a serious of life threatening condition. As such, patient was instructed to return immediately for any worsening or change in current symptoms.           ED Medication(s):  Medications   ondansetron 4 mg/5 mL solution 4 mg (4 mg Oral Given 2/16/22 0937)   dicyclomine tablet 20 mg (20 mg Oral Given 2/16/22 0937)   sodium chloride 0.9% bolus 1,000 mL (0 mLs Intravenous Stopped 2/16/22 1045)        Follow-up Information     MARIYA Altamirano In 2 days.    Specialty: Family Medicine  Why: Return to emergency department for worsening weakness, nausea,  vomiting, abdominal pain, fever, or other concerns.  Contact information:  [address]  [address] [place] [id number]  [phone]                         New Prescriptions    DICYCLOMINE (BENTYL) 20 MG TABLET    Take 1 tablet (20 mg total) by mouth 4 (four) times daily.    ONDANSETRON (ZOFRAN-ODT) 4 MG TBDL    Take 1 tablet (4 mg total) by mouth every 6 (six) hours as needed (nausea).        Medication List      START taking these medications    dicyclomine 20 mg tablet  Commonly known as: BENTYL  Take 1 tablet (20 mg total) by mouth 4 (four) times daily.     ondansetron 4 MG Tbdl  Commonly known as: ZOFRAN-ODT  Take 1 tablet (4 mg total) by mouth every 6 (six) hours as needed (nausea).        ASK your doctor about these medications    atorvastatin 10 MG tablet  Commonly known as: LIPITOR     blood sugar diagnostic Strp  Commonly known as: ACCU-CHEK SMARTVIEW TEST STRIP  Check BS fastin and 2 hrs after biggest meal     estradioL 1 MG tablet  Commonly known as: ESTRACE  Take 1 tablet (1 mg total) by mouth once daily.     fenofibrate 145 MG tablet  Commonly known as: TRICOR     fluticasone propionate 50 mcg/actuation nasal spray  Commonly known as: FLONASE     gabapentin 300 MG capsule  Commonly known as: NEURONTIN     * lancets Misc  Commonly known as: ACCU-CHEK SOFTCLIX LANCETS  Check BS fastin and 2 hrs after biggest meal     * ONETOUCH DELICA PLUS LANCET 33 gauge Misc  Generic drug: lancets     naproxen sodium 220 MG tablet  Commonly known as: ANAPROX     ONETOUCH VERIO FLEX METER Misc  Generic drug: blood-glucose meter     pantoprazole 40 MG tablet  Commonly known as: PROTONIX  Take 1 tablet (40 mg total) by mouth once daily.     PROZAC ORAL     TRESIBA FLEXTOUCH U-200 200 unit/mL (3 mL) insulin pen  Generic drug: insulin degludec     TRULICITY 1.5 mg/0.5 mL pen injector  Generic drug: dulaglutide     valACYclovir 1000 MG tablet  Commonly known as: VALTREX         * This list has 2 medication(s) that  are the same as other medications prescribed for you. Read the directions carefully, and ask your doctor or other care provider to review them with you.               Where to Get Your Medications      You can get these medications from any pharmacy    Bring a paper prescription for each of these medications  · dicyclomine 20 mg tablet  · ondansetron 4 MG Tbdl           Medical Decision Making    Medical Decision Making:   Clinical Tests:   Lab Tests: Ordered and Reviewed  Radiological Study: Ordered and Reviewed  Medical Tests: Ordered and Reviewed           Scribe Attestation:   Scribe #1: I performed the above scribed service and the documentation accurately describes the services I performed. I attest to the accuracy of the note.    Attending:   Physician Attestation Statement for Scribe #1: I, Fartun Song DO, personally performed the services described in this documentation, as scribed by Monica Sepulveda, in my presence, and it is both accurate and complete.          Clinical Impression       ICD-10-CM ICD-9-CM   1. Nausea vomiting and diarrhea  R11.2 787.91    R19.7 787.01   2. Abdominal pain  R10.9 789.00       Disposition:   Disposition: Discharged  Condition: Stable         Fartun Song DO  02/16/22 1648

## 2022-06-24 ENCOUNTER — HOSPITAL ENCOUNTER (EMERGENCY)
Facility: HOSPITAL | Age: 52
Discharge: HOME | End: 2022-06-24
Attending: EMERGENCY MEDICINE
Payer: MEDICAID

## 2022-06-24 VITALS
OXYGEN SATURATION: 97 % | TEMPERATURE: 98 F | HEART RATE: 86 BPM | DIASTOLIC BLOOD PRESSURE: 74 MMHG | BODY MASS INDEX: 30.44 KG/M2 | WEIGHT: 165.44 LBS | RESPIRATION RATE: 18 BRPM | SYSTOLIC BLOOD PRESSURE: 179 MMHG | HEIGHT: 62 IN

## 2022-06-24 DIAGNOSIS — J10.1 INFLUENZA A: Primary | ICD-10-CM

## 2022-06-24 DIAGNOSIS — R05.9 COUGH: ICD-10-CM

## 2022-06-24 DIAGNOSIS — B34.9 VIRAL SYNDROME: ICD-10-CM

## 2022-06-24 DIAGNOSIS — M79.10 MYALGIA: ICD-10-CM

## 2022-06-24 DIAGNOSIS — R52 BODY ACHES: ICD-10-CM

## 2022-06-24 LAB
CTP QC/QA: YES
CTP QC/QA: YES
POC MOLECULAR INFLUENZA A AGN: POSITIVE
POC MOLECULAR INFLUENZA B AGN: NEGATIVE
SARS-COV-2 RDRP RESP QL NAA+PROBE: NEGATIVE

## 2022-06-24 PROCEDURE — 99284 EMERGENCY DEPT VISIT MOD MDM: CPT | Mod: 25

## 2022-06-24 PROCEDURE — U0002 COVID-19 LAB TEST NON-CDC: HCPCS | Performed by: EMERGENCY MEDICINE

## 2022-06-24 PROCEDURE — 87502 INFLUENZA DNA AMP PROBE: CPT

## 2022-06-24 RX ORDER — PROMETHAZINE HYDROCHLORIDE AND DEXTROMETHORPHAN HYDROBROMIDE 6.25; 15 MG/5ML; MG/5ML
5 SYRUP ORAL EVERY 6 HOURS PRN
Qty: 120 ML | Refills: 0 | Status: SHIPPED | OUTPATIENT
Start: 2022-06-24 | End: 2022-07-04

## 2022-06-24 RX ORDER — OSELTAMIVIR PHOSPHATE 75 MG/1
75 CAPSULE ORAL 2 TIMES DAILY
Qty: 10 CAPSULE | Refills: 0 | Status: SHIPPED | OUTPATIENT
Start: 2022-06-24 | End: 2022-06-29

## 2022-06-24 NOTE — Clinical Note
"Yue "Yuepat Serrato was seen and treated in our emergency department on 6/24/2022.  She may return to work on 06/27/2022.       If you have any questions or concerns, please don't hesitate to call.      Carl Johnson MD"

## 2022-06-24 NOTE — ED PROVIDER NOTES
"SCRIBE #1 NOTE: I, Monica Sepulveda, am scribing for, and in the presence of, Carl Johnson MD. I have scribed the entire note.      History      Chief Complaint   Patient presents with    Generalized Body Aches     X several days, cough cold congestion fatigue and sore throat        Review of patient's allergies indicates:   Allergen Reactions    Morphine      Reports "headache"        HPI   HPI    2022, 10:48 AM   History obtained from the patient      History of Present Illness: Yue Serrato is a 52 y.o. female patient who presents to the Emergency Department for generalized myalgias which onset gradually 5 days PTA. Symptoms are constant and moderate in severity. No mitigating or exacerbating factors reported. Associated sxs include headache, cough, congestion, fatigue, and sore throat. Patient denies any fever, chills, CP, SOB, weakness, dizziness, and all other sxs at this time. No prior Tx reported. When asked if she had any sick contacts, the pt reported that she recently got back from a trip to [place] [facility]. No further complaints or concerns at this time.         Arrival mode: Personal vehicle     PCP: MARIYA Altamirano       Past Medical History:  Past Medical History:   Diagnosis Date    Anxiety     Depression     Diabetes mellitus type II     Diverticulitis     History of abnormal cervical Pap smear     HSV infection     Hyperlipidemia     Hypertension        Past Surgical History:  Past Surgical History:   Procedure Laterality Date     SECTION      x3    COLONOSCOPY  2019    normal    ESOPHAGOGASTRODUODENOSCOPY N/A 2021    Procedure: EGD (ESOPHAGOGASTRODUODENOSCOPY);  Surgeon: Prerna Poole MD;  [facility];  Service: Endoscopy;  Laterality: N/A;    LIPOMA RESECTION      TOTAL ABDOMINAL HYSTERECTOMY W/ BILATERAL SALPINGOOPHORECTOMY Bilateral 2005    dyplasia         Family History:  Family History   Problem Relation Age of Onset    Heart " disease Mother         CAD    Diabetes Mother     Hypertension Mother     Hyperlipidemia Mother     COPD Mother     COPD Father     Diabetes Father     Hyperlipidemia Father     Hypertension Father     Heart disease Father         MI    Colon cancer Neg Hx        Social History:  Social History     Tobacco Use    Smoking status: Never Smoker    Smokeless tobacco: Never Used   Substance and Sexual Activity    Alcohol use: No     Comment: occasionally    Drug use: No    Sexual activity: Not Currently     Partners: Male     Birth control/protection: See Surgical Hx     Comment: NIGEL/BSO       ROS   Review of Systems   Constitutional: Positive for fatigue. Negative for chills and fever.   HENT: Positive for congestion and sore throat.    Respiratory: Positive for cough. Negative for shortness of breath.    Cardiovascular: Negative for chest pain.   Gastrointestinal: Negative for nausea.   Genitourinary: Negative for dysuria.   Musculoskeletal: Positive for myalgias (generalized). Negative for back pain.   Skin: Negative for rash.   Neurological: Positive for headaches. Negative for dizziness and weakness.   Hematological: Does not bruise/bleed easily.   All other systems reviewed and are negative.    Physical Exam      Initial Vitals [06/24/22 1044]   BP Pulse Resp Temp SpO2   (!) 179/74 86 18 98.4 °F (36.9 °C) 97 %      MAP       --          Physical Exam  Nursing Notes and Vital Signs Reviewed.  Constitutional: Patient is in no acute distress. Well-developed and well-nourished.  Head: Atraumatic. Normocephalic.  Eyes: PERRL. EOM intact. Conjunctivae are not pale. No scleral icterus.  ENT: Mucous membranes are moist. Oropharynx is clear and symmetric.    Neck: Supple. Full ROM. No lymphadenopathy.  Cardiovascular: Regular rate. Regular rhythm. No murmurs, rubs, or gallops. Distal pulses are 2+ and symmetric.  Pulmonary/Chest: No respiratory distress. Clear to auscultation bilaterally. No wheezing or  "rales.  Abdominal: Soft and non-distended.  There is no tenderness.  No rebound, guarding, or rigidity.   Musculoskeletal: Moves all extremities. No obvious deformities. No edema.   Skin: Warm and dry.  Neurological:  Alert, awake, and appropriate.  Normal speech.  No acute focal neurological deficits are appreciated.  Psychiatric: Normal affect. Good eye contact. Appropriate in content.    ED Course    Procedures  ED Vital Signs:  Vitals:    06/24/22 1044   BP: (!) 179/74   Pulse: 86   Resp: 18   Temp: 98.4 °F (36.9 °C)   TempSrc: Oral   SpO2: 97%   Weight: 75 kg (165 lb 7.3 oz)   Height: 5' 2" (1.575 m)       Abnormal Lab Results:  Labs Reviewed   POCT INFLUENZA A/B MOLECULAR - Abnormal; Notable for the following components:       Result Value    POC Molecular Influenza A Ag Positive (*)     All other components within normal limits   SARS-COV-2 RDRP GENE    Narrative:     This test utilizes isothermal nucleic acid amplification   technology to detect the SARS-CoV-2 RdRp nucleic acid segment.   The analytical sensitivity (limit of detection) is 125 genome   equivalents/mL.   A POSITIVE result implies infection with the SARS-CoV-2 virus;   the patient is presumed to be contagious.     A NEGATIVE result means that SARS-CoV-2 nucleic acids are not   present above the limit of detection. A NEGATIVE result should be   treated as presumptive. It does not rule out the possibility of   COVID-19 and should not be the sole basis for treatment decisions.   If COVID-19 is strongly suspected based on clinical and exposure   history, re-testing using an alternate molecular assay should be   considered.   This test is only for use under the Food and Drug   Administration s Emergency Use Authorization (EUA).   Commercial kits are provided by [employer].   Performance characteristics of the EUA have been independently   verified by [facility] Department of   Pathology and Laboratory Medicine. "   _________________________________________________________________   The authorized Fact Sheet for Healthcare Providers and the authorized Fact   Sheet for Patients of the ID NOW COVID-19 are available on the FDA   website:     https://www.fda.gov/media/172780/download  https://www.fda.gov/media/777399/download               All Lab Results:  Results for orders placed or performed during the hospital encounter of 06/24/22   POCT COVID-19 Rapid Screening   Result Value Ref Range    POC Rapid COVID Negative Negative     Acceptable Yes    POCT Influenza A/B Molecular   Result Value Ref Range    POC Molecular Influenza A Ag Positive (A) Negative, Not Reported    POC Molecular Influenza B Ag Negative Negative, Not Reported     Acceptable Yes          Imaging Results:  Imaging Results    None                 The Emergency Provider reviewed the vital signs and test results, which are outlined above.    ED Discussion     11:36 AM: Reassessed pt at this time. Discussed with pt all pertinent ED information and results. Discussed pt dx and plan of tx. Gave pt all f/u and return to the ED instructions. All questions and concerns were addressed at this time. Pt expresses understanding of information and instructions, and is comfortable with plan to discharge. Pt is stable for discharge.    I discussed with patient and/or family/caretaker that evaluation in the ED does not suggest any emergent or life threatening medical conditions requiring immediate intervention beyond what was provided in the ED, and I believe patient is safe for discharge.  Regardless, an unremarkable evaluation in the ED does not preclude the development or presence of a serious of life threatening condition. As such, patient was instructed to return immediately for any worsening or change in current symptoms.           ED Medication(s):  Medications - No data to display     Follow-up Information     MARIYA Altamirano.     Specialty: Family Medicine  Contact information:  [address]  [address]  [facility] [address]  [phone]                        Discharge Medication List as of 6/24/2022 11:32 AM      START taking these medications    Details   oseltamivir (TAMIFLU) 75 MG capsule Take 1 capsule (75 mg total) by mouth 2 (two) times daily. for 5 days, Starting Fri 6/24/2022, Until Wed 6/29/2022, Normal      promethazine-dextromethorphan (PROMETHAZINE-DM) 6.25-15 mg/5 mL Syrp Take 5 mLs by mouth every 6 (six) hours as needed., Starting Fri 6/24/2022, Until Mon 7/4/2022 at 2359, Normal                  Medication List      START taking these medications    oseltamivir 75 MG capsule  Commonly known as: TAMIFLU  Take 1 capsule (75 mg total) by mouth 2 (two) times daily. for 5 days     promethazine-dextromethorphan 6.25-15 mg/5 mL Syrp  Commonly known as: PROMETHAZINE-DM  Take 5 mLs by mouth every 6 (six) hours as needed.        ASK your doctor about these medications    acetaminophen 500 MG tablet  Commonly known as: TYLENOL     albuterol 90 mcg/actuation inhaler  Commonly known as: PROVENTIL/VENTOLIN HFA     atorvastatin 10 MG tablet  Commonly known as: LIPITOR     blood sugar diagnostic Strp  Commonly known as: ACCU-CHEK SMARTVIEW TEST STRIP  Check BS fastin and 2 hrs after biggest meal     busPIRone 7.5 MG tablet  Commonly known as: BUSPAR     dicyclomine 20 mg tablet  Commonly known as: BENTYL  Take 1 tablet (20 mg total) by mouth 4 (four) times daily.     ERLOTINIB ORAL     estradioL 1 MG tablet  Commonly known as: ESTRACE  Take 1 tablet (1 mg total) by mouth once daily.     fenofibrate 145 MG tablet  Commonly known as: TRICOR     fenofibrate 160 MG Tab     fluconazole 150 MG Tab  Commonly known as: DIFLUCAN  Take 1 tablet by mouth today and again in 3 days     fluticasone propionate 50 mcg/actuation nasal spray  Commonly known as: FLONASE     gabapentin 300 MG capsule  Commonly known as: NEURONTIN     * lancets  Misc  Commonly known as: ACCU-CHEK SOFTCLIX LANCETS  Check BS fastin and 2 hrs after biggest meal     * ONETOUCH DELICA PLUS LANCET 33 gauge Misc  Generic drug: lancets     naproxen sodium 220 MG tablet  Commonly known as: ANAPROX     nystatin cream  Commonly known as: MYCOSTATIN  Apply topically 2 (two) times daily. for 7 days     ondansetron 4 MG Tbdl  Commonly known as: ZOFRAN-ODT  Take 1 tablet (4 mg total) by mouth every 6 (six) hours as needed (nausea).     ONETOUCH VERIO FLEX METER Misc  Generic drug: blood-glucose meter     pantoprazole 40 MG tablet  Commonly known as: PROTONIX  Take 1 tablet (40 mg total) by mouth once daily.     PROZAC ORAL     TRESIBA FLEXTOUCH U-200 200 unit/mL (3 mL) insulin pen  Generic drug: insulin degludec     TRULICITY 1.5 mg/0.5 mL pen injector  Generic drug: dulaglutide     valACYclovir 1000 MG tablet  Commonly known as: VALTREX         * This list has 2 medication(s) that are the same as other medications prescribed for you. Read the directions carefully, and ask your doctor or other care provider to review them with you.               Where to Get Your Medications      These medications were sent to [facility] [address] [place] [address]    Phone: [phone]   · oseltamivir 75 MG capsule  · promethazine-dextromethorphan 6.25-15 mg/5 mL Syrp           Medical Decision Making    Medical Decision Making:   Clinical Tests:   Lab Tests: Ordered and Reviewed           Scribe Attestation:   Scribe #1: I performed the above scribed service and the documentation accurately describes the services I performed. I attest to the accuracy of the note.    Attending:   Physician Attestation Statement for Scribe #1: I, Carl Johnson MD, personally performed the services described in this documentation, as scribed by Monica Sepulveda, in my presence, and it is both accurate and complete.          Clinical Impression        ICD-10-CM ICD-9-CM   1. Influenza A  J10.1 487.1   2. Viral syndrome  B34.9 079.99   3. Cough  R05.9 786.2   4. Myalgia  M79.10 729.1   5. Body aches  R52 780.96       Disposition:   Disposition: Discharged  Condition: Stable         Carl Johnson MD  06/24/22 [id number]

## 2022-09-20 ENCOUNTER — HOSPITAL ENCOUNTER (EMERGENCY)
Facility: HOSPITAL | Age: 52
Discharge: HOME | End: 2022-09-20
Attending: EMERGENCY MEDICINE
Payer: MEDICAID

## 2022-09-20 VITALS
BODY MASS INDEX: 31.05 KG/M2 | DIASTOLIC BLOOD PRESSURE: 68 MMHG | OXYGEN SATURATION: 97 % | SYSTOLIC BLOOD PRESSURE: 144 MMHG | HEART RATE: 85 BPM | WEIGHT: 175.25 LBS | TEMPERATURE: 98 F | RESPIRATION RATE: 17 BRPM

## 2022-09-20 DIAGNOSIS — R10.84 GENERALIZED ABDOMINAL PAIN: Primary | ICD-10-CM

## 2022-09-20 DIAGNOSIS — R19.7 DIARRHEA, UNSPECIFIED TYPE: ICD-10-CM

## 2022-09-20 DIAGNOSIS — N39.0 URINARY TRACT INFECTION WITHOUT HEMATURIA, SITE UNSPECIFIED: ICD-10-CM

## 2022-09-20 LAB
ALBUMIN SERPL BCP-MCNC: 4.2 G/DL (ref 3.5–5.2)
ALP SERPL-CCNC: 81 U/L (ref 55–135)
ALT SERPL W/O P-5'-P-CCNC: 25 U/L (ref 10–44)
ANION GAP SERPL CALC-SCNC: 14 MMOL/L (ref 8–16)
AST SERPL-CCNC: 19 U/L (ref 10–40)
BACTERIA #/AREA URNS HPF: ABNORMAL /HPF
BASOPHILS # BLD AUTO: 0.07 K/UL (ref 0–0.2)
BASOPHILS NFR BLD: 0.8 % (ref 0–1.9)
BILIRUB SERPL-MCNC: 0.6 MG/DL (ref 0.1–1)
BILIRUB UR QL STRIP: NEGATIVE
BUN SERPL-MCNC: 14 MG/DL (ref 6–20)
CALCIUM SERPL-MCNC: 9.8 MG/DL (ref 8.7–10.5)
CHLORIDE SERPL-SCNC: 102 MMOL/L (ref 95–110)
CLARITY UR: CLEAR
CO2 SERPL-SCNC: 22 MMOL/L (ref 23–29)
COLOR UR: YELLOW
CREAT SERPL-MCNC: 0.8 MG/DL (ref 0.5–1.4)
DIFFERENTIAL METHOD: ABNORMAL
EOSINOPHIL # BLD AUTO: 0.2 K/UL (ref 0–0.5)
EOSINOPHIL NFR BLD: 2 % (ref 0–8)
ERYTHROCYTE [DISTWIDTH] IN BLOOD BY AUTOMATED COUNT: 11.9 % (ref 11.5–14.5)
EST. GFR  (NO RACE VARIABLE): >60 ML/MIN/1.73 M^2
GLUCOSE SERPL-MCNC: 285 MG/DL (ref 70–110)
GLUCOSE UR QL STRIP: ABNORMAL
HCT VFR BLD AUTO: 38.1 % (ref 37–48.5)
HCV AB SERPL QL IA: NEGATIVE
HEP C VIRUS HOLD SPECIMEN: NORMAL
HGB BLD-MCNC: 13.1 G/DL (ref 12–16)
HGB UR QL STRIP: NEGATIVE
HIV 1+2 AB+HIV1 P24 AG SERPL QL IA: NEGATIVE
IMM GRANULOCYTES # BLD AUTO: 0.17 K/UL (ref 0–0.04)
IMM GRANULOCYTES NFR BLD AUTO: 1.9 % (ref 0–0.5)
KETONES UR QL STRIP: NEGATIVE
LEUKOCYTE ESTERASE UR QL STRIP: ABNORMAL
LIPASE SERPL-CCNC: 47 U/L (ref 4–60)
LYMPHOCYTES # BLD AUTO: 3.1 K/UL (ref 1–4.8)
LYMPHOCYTES NFR BLD: 34.1 % (ref 18–48)
MCH RBC QN AUTO: 29 PG (ref 27–31)
MCHC RBC AUTO-ENTMCNC: 34.4 G/DL (ref 32–36)
MCV RBC AUTO: 85 FL (ref 82–98)
MICROSCOPIC COMMENT: ABNORMAL
MONOCYTES # BLD AUTO: 0.6 K/UL (ref 0.3–1)
MONOCYTES NFR BLD: 6.7 % (ref 4–15)
NEUTROPHILS # BLD AUTO: 4.9 K/UL (ref 1.8–7.7)
NEUTROPHILS NFR BLD: 54.5 % (ref 38–73)
NITRITE UR QL STRIP: NEGATIVE
NRBC BLD-RTO: 0 /100 WBC
PH UR STRIP: 6 [PH] (ref 5–8)
PLATELET # BLD AUTO: 236 K/UL (ref 150–450)
PMV BLD AUTO: 9.5 FL (ref 9.2–12.9)
POTASSIUM SERPL-SCNC: 4 MMOL/L (ref 3.5–5.1)
PROT SERPL-MCNC: 7.9 G/DL (ref 6–8.4)
PROT UR QL STRIP: NEGATIVE
RBC # BLD AUTO: 4.51 M/UL (ref 4–5.4)
RBC #/AREA URNS HPF: 2 /HPF (ref 0–4)
SODIUM SERPL-SCNC: 138 MMOL/L (ref 136–145)
SP GR UR STRIP: 1.02 (ref 1–1.03)
SQUAMOUS #/AREA URNS HPF: 3 /HPF
URN SPEC COLLECT METH UR: ABNORMAL
UROBILINOGEN UR STRIP-ACNC: NEGATIVE EU/DL
WBC # BLD AUTO: 9.04 K/UL (ref 3.9–12.7)
WBC #/AREA URNS HPF: 8 /HPF (ref 0–5)
YEAST URNS QL MICRO: ABNORMAL

## 2022-09-20 PROCEDURE — 96361 HYDRATE IV INFUSION ADD-ON: CPT

## 2022-09-20 PROCEDURE — 96374 THER/PROPH/DIAG INJ IV PUSH: CPT | Mod: 59

## 2022-09-20 PROCEDURE — 87389 HIV-1 AG W/HIV-1&-2 AB AG IA: CPT | Performed by: EMERGENCY MEDICINE

## 2022-09-20 PROCEDURE — 83690 ASSAY OF LIPASE: CPT | Performed by: NURSE PRACTITIONER

## 2022-09-20 PROCEDURE — 25000003 PHARM REV CODE 250: Performed by: EMERGENCY MEDICINE

## 2022-09-20 PROCEDURE — 80053 COMPREHEN METABOLIC PANEL: CPT | Performed by: NURSE PRACTITIONER

## 2022-09-20 PROCEDURE — 86803 HEPATITIS C AB TEST: CPT | Performed by: EMERGENCY MEDICINE

## 2022-09-20 PROCEDURE — 25500020 PHARM REV CODE 255: Performed by: EMERGENCY MEDICINE

## 2022-09-20 PROCEDURE — 81000 URINALYSIS NONAUTO W/SCOPE: CPT | Performed by: NURSE PRACTITIONER

## 2022-09-20 PROCEDURE — 96375 TX/PRO/DX INJ NEW DRUG ADDON: CPT

## 2022-09-20 PROCEDURE — 63600175 PHARM REV CODE 636 W HCPCS: Performed by: EMERGENCY MEDICINE

## 2022-09-20 PROCEDURE — 25000003 PHARM REV CODE 250: Performed by: NURSE PRACTITIONER

## 2022-09-20 PROCEDURE — 85025 COMPLETE CBC W/AUTO DIFF WBC: CPT | Performed by: NURSE PRACTITIONER

## 2022-09-20 PROCEDURE — 99285 EMERGENCY DEPT VISIT HI MDM: CPT | Mod: 25

## 2022-09-20 RX ORDER — PROMETHAZINE HYDROCHLORIDE 25 MG/1
25 TABLET ORAL EVERY 6 HOURS PRN
COMMUNITY
Start: 2022-08-17 | End: 2024-02-07

## 2022-09-20 RX ORDER — CEFDINIR 300 MG/1
300 CAPSULE ORAL 2 TIMES DAILY
Qty: 20 CAPSULE | Refills: 0 | Status: SHIPPED | OUTPATIENT
Start: 2022-09-20 | End: 2022-09-30

## 2022-09-20 RX ORDER — CEFDINIR 300 MG/1
300 CAPSULE ORAL EVERY 12 HOURS
Status: DISCONTINUED | OUTPATIENT
Start: 2022-09-20 | End: 2022-09-21 | Stop reason: HOSPADM

## 2022-09-20 RX ORDER — ONDANSETRON 2 MG/ML
4 INJECTION INTRAMUSCULAR; INTRAVENOUS
Status: COMPLETED | OUTPATIENT
Start: 2022-09-20 | End: 2022-09-20

## 2022-09-20 RX ORDER — ACETAMINOPHEN 500 MG
1 TABLET ORAL DAILY
Qty: 30 CAPSULE | Refills: 0 | Status: SHIPPED | OUTPATIENT
Start: 2022-09-20 | End: 2024-02-07

## 2022-09-20 RX ORDER — KETOROLAC TROMETHAMINE 30 MG/ML
15 INJECTION, SOLUTION INTRAMUSCULAR; INTRAVENOUS
Status: COMPLETED | OUTPATIENT
Start: 2022-09-20 | End: 2022-09-20

## 2022-09-20 RX ADMIN — KETOROLAC TROMETHAMINE 15 MG: 30 INJECTION, SOLUTION INTRAMUSCULAR; INTRAVENOUS at 09:09

## 2022-09-20 RX ADMIN — CEFDINIR 300 MG: 300 CAPSULE ORAL at 10:09

## 2022-09-20 RX ADMIN — IOHEXOL 100 ML: 350 INJECTION, SOLUTION INTRAVENOUS at 08:09

## 2022-09-20 RX ADMIN — ONDANSETRON 4 MG: 2 INJECTION INTRAMUSCULAR; INTRAVENOUS at 09:09

## 2022-09-20 RX ADMIN — SODIUM CHLORIDE 1000 ML: 0.9 INJECTION, SOLUTION INTRAVENOUS at 07:09

## 2022-09-20 NOTE — FIRST PROVIDER EVALUATION
Medical screening examination initiated.  I have conducted a focused provider triage encounter, findings are as follows:    Brief history of present illness:  Patient presents with right upper and epigastric abdominal pain.  States that she has been having abdominal issues for 5-6 weeks but the pain started about 2 days ago.    Vitals:    09/20/22 1754 09/20/22 1756   BP:  (!) 187/81   BP Location:  Right arm   Patient Position:  Sitting   Pulse:  105   Resp:  16   Temp:  97.5 °F (36.4 °C)   TempSrc:  Oral   SpO2:  99%   Weight: 175 lb 4.3 oz (79.5 kg)        Pertinent physical exam:      Brief workup plan:      Preliminary workup initiated; this workup will be continued and followed by the physician or advanced practice provider that is assigned to the patient when roomed.

## 2022-09-21 NOTE — ED PROVIDER NOTES
"SCRIBE #1 NOTE: I, Dario Shi, am scribing for, and in the presence of, Joyce Lockhart Do, MD. I have scribed the entire note.       History     Chief Complaint   Patient presents with    Abdominal Pain     pt has been having diarrhea and gas for 5-6 weeks, pain to L side abdomen started 2 days ago. Pt has US scheduled for next week. Denies n/v. Hx diverticulitis      Review of patient's allergies indicates:   Allergen Reactions    Morphine      Reports "headache"  Other reaction(s): HEADACHE         History of Present Illness     HPI    2022, 8:40 PM  History obtained from the patient      History of Present Illness: Yue Serrato is a 52 y.o. female patient with a PMHx of Type II DM, Diverticulitis, HLD, and HTN who presents to the Emergency Department for evaluation of abdominal pain which onset gradually 2 days ago. Pt reports that she has been having intermittent diarrhea for 2 weeks now. She has seen her PCP and has an ultrasound scheduled for next week. Pt is now having worsening abdominal pain. Symptoms are constant and moderate in severity. No mitigating or exacerbating factors reported. Associated sxs include abdominal distention. Patient denies any fever, nausea, vomiting, cough, CP, blood in stool, and all other sxs at this time. Pt's last colonoscopy was approximately 5 years ago. No further complaints or concerns at this time.       Arrival mode: Personal vehicle    PCP: MARIYA Altamirano        Past Medical History:  Past Medical History:   Diagnosis Date    Anxiety     Depression     Diabetes mellitus type II     Diverticulitis 2012    History of abnormal cervical Pap smear     HSV infection     Hyperlipidemia     Hypertension        Past Surgical History:  Past Surgical History:   Procedure Laterality Date     SECTION      x3    COLONOSCOPY  2019    normal    ESOPHAGOGASTRODUODENOSCOPY N/A 2021    Procedure: EGD (ESOPHAGOGASTRODUODENOSCOPY);  Surgeon: Prerna Poole MD;  " Location: [facility];  Service: Endoscopy;  Laterality: N/A;    LIPOMA RESECTION      TOTAL ABDOMINAL HYSTERECTOMY W/ BILATERAL SALPINGOOPHORECTOMY Bilateral 11/2005    dyplasia         Family History:  Family History   Problem Relation Age of Onset    Heart disease Mother         CAD    Diabetes Mother     Hypertension Mother     Hyperlipidemia Mother     COPD Mother     COPD Father     Diabetes Father     Hyperlipidemia Father     Hypertension Father     Heart disease Father         MI    Colon cancer Neg Hx        Social History:  Social History     Tobacco Use    Smoking status: Never    Smokeless tobacco: Never   Substance and Sexual Activity    Alcohol use: Yes     Comment: occasionally    Drug use: No    Sexual activity: Yes     Partners: Male     Birth control/protection: See Surgical Hx     Comment: NIGEL/BSO        Review of Systems     Review of Systems   Constitutional:  Negative for fever.   HENT:  Negative for sore throat.    Respiratory:  Negative for cough and shortness of breath.    Cardiovascular:  Negative for chest pain.   Gastrointestinal:  Positive for abdominal distention, abdominal pain and diarrhea. Negative for blood in stool, nausea and vomiting.   Genitourinary:  Negative for dysuria.   Musculoskeletal:  Negative for back pain.   Skin:  Negative for rash.   Neurological:  Negative for weakness.   Hematological:  Does not bruise/bleed easily.   All other systems reviewed and are negative.     Physical Exam     Initial Vitals [09/20/22 1756]   BP Pulse Resp Temp SpO2   (!) 187/81 105 16 97.5 °F (36.4 °C) 99 %      MAP       --          Physical Exam  Nursing Notes and Vital Signs Reviewed.  Constitutional: Patient is in no acute distress. Well-developed and well-nourished.  Head: Atraumatic. Normocephalic.  Eyes: EOM intact. Conjunctivae are not pale. No scleral icterus.  ENT: Mucous membranes are moist. Oropharynx is clear and symmetric.    Neck: Supple. Full ROM.  Cardiovascular: Regular  rate. Regular rhythm. No murmurs, rubs, or gallops. Distal pulses are 2+ and symmetric.  Pulmonary/Chest: No respiratory distress. Clear to auscultation bilaterally. No wheezing or rales.  Abdominal: Distended but not tense. Generalized tenderness.  No rebound, guarding, or rigidity. Hyperactive bowel sounds  Musculoskeletal: Moves all extremities. No obvious deformities. No edema.   Skin: Warm and dry.  Neurological:  Alert, awake, and appropriate.  Normal speech.  No acute focal neurological deficits are appreciated.  Psychiatric: Normal affect. Good eye contact. Appropriate in content.     ED Course   Procedures  ED Vital Signs:  Vitals:    09/20/22 1754 09/20/22 1756 09/20/22 1954 09/20/22 2032   BP:  (!) 187/81  (!) 154/70   Pulse:  105 91 91   Resp:  16     Temp:  97.5 °F (36.4 °C)     TempSrc:  Oral     SpO2:  99%  99%   Weight: 175 lb 4.3 oz (79.5 kg)       09/20/22 2120 09/20/22 2201 09/20/22 2232   BP: (!) 164/75 (!) 152/72 (!) 144/68   Pulse: 87 86 85   Resp:   17   Temp:   97.9 °F (36.6 °C)   TempSrc:   Oral   SpO2: 99% 96% 97%   Weight:          Abnormal Lab Results:  Labs Reviewed   CBC W/ AUTO DIFFERENTIAL - Abnormal; Notable for the following components:       Result Value    Immature Granulocytes 1.9 (*)     Immature Grans (Abs) 0.17 (*)     All other components within normal limits    Narrative:     Release to patient->Immediate   COMPREHENSIVE METABOLIC PANEL - Abnormal; Notable for the following components:    CO2 22 (*)     Glucose 285 (*)     All other components within normal limits    Narrative:     Release to patient->Immediate   URINALYSIS, REFLEX TO URINE CULTURE - Abnormal; Notable for the following components:    Glucose, UA 4+ (*)     Leukocytes, UA Trace (*)     All other components within normal limits    Narrative:     Specimen Source->Urine   URINALYSIS MICROSCOPIC - Abnormal; Notable for the following components:    WBC, UA 8 (*)     All other components within normal limits     Narrative:     Specimen Source->Urine   HIV 1 / 2 ANTIBODY    Narrative:     Release to patient->Immediate   HEPATITIS C ANTIBODY    Narrative:     Release to patient->Immediate   HEP C VIRUS HOLD SPECIMEN    Narrative:     Release to patient->Immediate   LIPASE    Narrative:     Release to patient->Immediate        All Lab Results:  Results for orders placed or performed during the hospital encounter of 09/20/22   HIV 1/2 Ag/Ab (4th Gen)   Result Value Ref Range    HIV 1/2 Ag/Ab Negative Negative   Hepatitis C Antibody   Result Value Ref Range    Hepatitis C Ab Negative Negative   HCV Virus Hold Specimen   Result Value Ref Range    HEP C Virus Hold Specimen Hold for HCV sendout    CBC auto differential   Result Value Ref Range    WBC 9.04 3.90 - 12.70 K/uL    RBC 4.51 4.00 - 5.40 M/uL    Hemoglobin 13.1 12.0 - 16.0 g/dL    Hematocrit 38.1 37.0 - 48.5 %    MCV 85 82 - 98 fL    MCH 29.0 27.0 - 31.0 pg    MCHC 34.4 32.0 - 36.0 g/dL    RDW 11.9 11.5 - 14.5 %    Platelets 236 150 - 450 K/uL    MPV 9.5 9.2 - 12.9 fL    Immature Granulocytes 1.9 (H) 0.0 - 0.5 %    Gran # (ANC) 4.9 1.8 - 7.7 K/uL    Immature Grans (Abs) 0.17 (H) 0.00 - 0.04 K/uL    Lymph # 3.1 1.0 - 4.8 K/uL    Mono # 0.6 0.3 - 1.0 K/uL    Eos # 0.2 0.0 - 0.5 K/uL    Baso # 0.07 0.00 - 0.20 K/uL    nRBC 0 0 /100 WBC    Gran % 54.5 38.0 - 73.0 %    Lymph % 34.1 18.0 - 48.0 %    Mono % 6.7 4.0 - 15.0 %    Eosinophil % 2.0 0.0 - 8.0 %    Basophil % 0.8 0.0 - 1.9 %    Differential Method Automated    Comprehensive metabolic panel   Result Value Ref Range    Sodium 138 136 - 145 mmol/L    Potassium 4.0 3.5 - 5.1 mmol/L    Chloride 102 95 - 110 mmol/L    CO2 22 (L) 23 - 29 mmol/L    Glucose 285 (H) 70 - 110 mg/dL    BUN 14 6 - 20 mg/dL    Creatinine 0.8 0.5 - 1.4 mg/dL    Calcium 9.8 8.7 - 10.5 mg/dL    Total Protein 7.9 6.0 - 8.4 g/dL    Albumin 4.2 3.5 - 5.2 g/dL    Total Bilirubin 0.6 0.1 - 1.0 mg/dL    Alkaline Phosphatase 81 55 - 135 U/L    AST 19 10 - 40  U/L    ALT 25 10 - 44 U/L    Anion Gap 14 8 - 16 mmol/L    eGFR >60 >60 mL/min/1.73 m^2   Lipase   Result Value Ref Range    Lipase 47 4 - 60 U/L   Urinalysis, Reflex to Urine Culture Urine, Clean Catch    Specimen: Urine   Result Value Ref Range    Specimen UA Urine, Clean Catch     Color, UA Yellow Yellow, Straw, Naa    Appearance, UA Clear Clear    pH, UA 6.0 5.0 - 8.0    Specific Gravity, UA 1.025 1.005 - 1.030    Protein, UA Negative Negative    Glucose, UA 4+ (A) Negative    Ketones, UA Negative Negative    Bilirubin (UA) Negative Negative    Occult Blood UA Negative Negative    Nitrite, UA Negative Negative    Urobilinogen, UA Negative <2.0 EU/dL    Leukocytes, UA Trace (A) Negative   Urinalysis Microscopic   Result Value Ref Range    RBC, UA 2 0 - 4 /hpf    WBC, UA 8 (H) 0 - 5 /hpf    Bacteria Rare None-Occ /hpf    Yeast, UA None None    Squam Epithel, UA 3 /hpf    Microscopic Comment SEE COMMENT          Imaging Results:  Imaging Results              CT Abdomen Pelvis With Contrast (Final result)  Result time 09/20/22 21:34:00      Final result by Tyrell Nix MD (09/20/22 21:34:00)                   Impression:      No definite acute abnormality identified.  Clinical correlation and further evaluation as warranted.  Specifically no inflammatory stranding about the colon or findings to specifically suggest ongoing diarrheal illness.    All CT scans at this facility are performed  using dose modulation techniques as appropriate to performed exam including the following:  automated exposure control; adjustment of mA and/or kV according to the patients size (this includes techniques or standardized protocols for targeted exams where dose is matched to indication/reason for exam: i.e. extremities or head);  iterative reconstruction technique.      Electronically signed by: Tyrell Nix  Date:    09/20/2022  Time:    21:34               Narrative:    EXAMINATION:  CT ABDOMEN PELVIS WITH  CONTRAST    CLINICAL HISTORY:  Abdominal pain, acute, nonlocalized;    TECHNIQUE:  Low dose axial images, sagittal and coronal reformations were obtained from the lung bases to the pubic symphysis.  Contrast was administered.  Images acquired after the administration 100 mL Omnipaque 350 IV contrast.    COMPARISON:  Multiple priors.    FINDINGS:  Heart: Normal in size. No pericardial effusion.    Lung Bases: Well aerated, without consolidation or pleural fluid.    Liver: Normal in size and attenuation, with no focal hepatic lesions.    Gallbladder: No calcified gallstones.    Bile Ducts: No evidence of dilated ducts.    Pancreas: No mass or peripancreatic fat stranding.    Spleen: Unremarkable.    Adrenals: Unremarkable.    Kidneys/ Ureters: No hydronephrosis.  No obstructive uropathy.  No nonobstructive nephrolithiasis.  Symmetric enhancement.    Bladder: No evidence of wall thickening.    Reproductive organs: Uterus is surgically absent.    GI Tract/Mesentery: No evidence of bowel obstruction or inflammation.  Diverticulosis without diverticulitis.  No evidence of appendicitis.    Peritoneal Space: No ascites. No free air.    Retroperitoneum: No significant adenopathy.    Abdominal wall: Unremarkable.    Vasculature: No significant atherosclerosis or aneurysm.    Bones: No acute fracture.                                              The Emergency Provider reviewed the vital signs and test results, which are outlined above.     ED Discussion       10:21 PM: Reassessed pt at this time. Discussed with pt all pertinent ED information and results. Discussed pt dx and plan of tx. Gave pt all f/u and return to the ED instructions. All questions and concerns were addressed at this time. Pt expresses understanding of information and instructions, and is comfortable with plan to discharge. Pt is stable for discharge.    I discussed with patient and/or family/caretaker that evaluation in the ED does not suggest any emergent  or life threatening medical conditions requiring immediate intervention beyond what was provided in the ED, and I believe patient is safe for discharge.  Regardless, an unremarkable evaluation in the ED does not preclude the development or presence of a serious of life threatening condition. As such, patient was instructed to return immediately for any worsening or change in current symptoms.      ED Course as of 09/21/22 0344   Tue Sep 20, 2022   2225 No acute findings on CT scan.  Will discharge her with antibiotics for UTI.  And refer her to Gastroenterology ambulatory care [TD]      ED Course User Index  [TD] Joyce Lockhart Do, MD     Medical Decision Making:   Clinical Tests:   Lab Tests: Ordered and Reviewed  Radiological Study: Ordered and Reviewed         ED Medication(s):  Medications   sodium chloride 0.9% bolus 1,000 mL (0 mLs Intravenous Stopped 9/20/22 2112)   ketorolac injection 15 mg (15 mg Intravenous Given 9/20/22 2114)   ondansetron injection 4 mg (4 mg Intravenous Given 9/20/22 2114)   iohexoL (OMNIPAQUE 350) injection 100 mL (100 mLs Intravenous Given 9/20/22 2055)       Discharge Medication List as of 9/20/2022 10:28 PM        START taking these medications    Details   cefdinir (OMNICEF) 300 MG capsule Take 1 capsule (300 mg total) by mouth 2 (two) times daily. for 10 days, Starting Tue 9/20/2022, Until Fri 9/30/2022, Normal      Lactobacillus acidophilus (PROBIOTIC) 10 billion cell Cap Take 1 capsule by mouth once daily., Starting Tue 9/20/2022, Normal              Follow-up Information       MARIYA Altamirano In 1 day.    Specialty: Family Medicine  Contact information:  [address]  [address]  [facility] [address]  [phone]                                 Scribe Attestation:   Scribe #1: I performed the above scribed service and the documentation accurately describes the services I performed. I attest to the accuracy of the note.     Attending:   Physician Attestation Statement for  Scribe #1: I, Joyce Lockhart Do, MD, personally performed the services described in this documentation, as scribed by Dario Shi, in my presence, and it is both accurate and complete.           Clinical Impression       ICD-10-CM ICD-9-CM   1. Generalized abdominal pain  R10.84 789.07   2. Diarrhea, unspecified type  R19.7 787.91   3. Urinary tract infection without hematuria, site unspecified  N39.0 599.0       Disposition:   Disposition: Discharged  Condition: Stable       Joyce Lockhart Do, MD  09/21/22 0344

## 2022-10-12 ENCOUNTER — HOSPITAL ENCOUNTER (EMERGENCY)
Facility: HOSPITAL | Age: 52
Discharge: HOME | End: 2022-10-12
Attending: EMERGENCY MEDICINE
Payer: MEDICAID

## 2022-10-12 VITALS
RESPIRATION RATE: 18 BRPM | TEMPERATURE: 98 F | BODY MASS INDEX: 30.66 KG/M2 | SYSTOLIC BLOOD PRESSURE: 156 MMHG | DIASTOLIC BLOOD PRESSURE: 77 MMHG | OXYGEN SATURATION: 97 % | HEART RATE: 82 BPM | WEIGHT: 173.06 LBS

## 2022-10-12 DIAGNOSIS — B96.89 BACTERIAL VAGINOSIS: Primary | ICD-10-CM

## 2022-10-12 DIAGNOSIS — N30.01 ACUTE CYSTITIS WITH HEMATURIA: ICD-10-CM

## 2022-10-12 DIAGNOSIS — N76.0 BACTERIAL VAGINOSIS: Primary | ICD-10-CM

## 2022-10-12 DIAGNOSIS — R73.9 HYPERGLYCEMIA: ICD-10-CM

## 2022-10-12 LAB
ALBUMIN SERPL BCP-MCNC: 4.2 G/DL (ref 3.5–5.2)
ALP SERPL-CCNC: 113 U/L (ref 55–135)
ALT SERPL W/O P-5'-P-CCNC: 29 U/L (ref 10–44)
ANION GAP SERPL CALC-SCNC: 13 MMOL/L (ref 8–16)
AST SERPL-CCNC: 21 U/L (ref 10–40)
BACTERIA #/AREA URNS HPF: ABNORMAL /HPF
BACTERIA GENITAL QL WET PREP: ABNORMAL
BACTERIA GENITAL QL WET PREP: ABNORMAL
BASOPHILS # BLD AUTO: 0.06 K/UL (ref 0–0.2)
BASOPHILS NFR BLD: 0.8 % (ref 0–1.9)
BILIRUB SERPL-MCNC: 0.7 MG/DL (ref 0.1–1)
BILIRUB UR QL STRIP: NEGATIVE
BUN SERPL-MCNC: 16 MG/DL (ref 6–20)
CALCIUM SERPL-MCNC: 10.1 MG/DL (ref 8.7–10.5)
CHLORIDE SERPL-SCNC: 100 MMOL/L (ref 95–110)
CLARITY UR: CLEAR
CLUE CELLS VAG QL WET PREP: ABNORMAL
CLUE CELLS VAG QL WET PREP: ABNORMAL
CO2 SERPL-SCNC: 22 MMOL/L (ref 23–29)
COLOR UR: COLORLESS
CREAT SERPL-MCNC: 0.9 MG/DL (ref 0.5–1.4)
DIFFERENTIAL METHOD: ABNORMAL
EOSINOPHIL # BLD AUTO: 0.1 K/UL (ref 0–0.5)
EOSINOPHIL NFR BLD: 1.7 % (ref 0–8)
ERYTHROCYTE [DISTWIDTH] IN BLOOD BY AUTOMATED COUNT: 12.2 % (ref 11.5–14.5)
EST. GFR  (NO RACE VARIABLE): >60 ML/MIN/1.73 M^2
FILAMENT FUNGI VAG WET PREP-#/AREA: ABNORMAL
FILAMENT FUNGI VAG WET PREP-#/AREA: ABNORMAL
GLUCOSE SERPL-MCNC: 381 MG/DL (ref 70–110)
GLUCOSE UR QL STRIP: ABNORMAL
HCT VFR BLD AUTO: 39.4 % (ref 37–48.5)
HGB BLD-MCNC: 13.6 G/DL (ref 12–16)
HGB UR QL STRIP: ABNORMAL
IMM GRANULOCYTES # BLD AUTO: 0.07 K/UL (ref 0–0.04)
IMM GRANULOCYTES NFR BLD AUTO: 0.9 % (ref 0–0.5)
KETONES UR QL STRIP: NEGATIVE
LEUKOCYTE ESTERASE UR QL STRIP: ABNORMAL
LYMPHOCYTES # BLD AUTO: 2 K/UL (ref 1–4.8)
LYMPHOCYTES NFR BLD: 26.1 % (ref 18–48)
MCH RBC QN AUTO: 28.8 PG (ref 27–31)
MCHC RBC AUTO-ENTMCNC: 34.5 G/DL (ref 32–36)
MCV RBC AUTO: 84 FL (ref 82–98)
MICROSCOPIC COMMENT: ABNORMAL
MONOCYTES # BLD AUTO: 0.5 K/UL (ref 0.3–1)
MONOCYTES NFR BLD: 7 % (ref 4–15)
NEUTROPHILS # BLD AUTO: 4.8 K/UL (ref 1.8–7.7)
NEUTROPHILS NFR BLD: 63.5 % (ref 38–73)
NITRITE UR QL STRIP: NEGATIVE
NRBC BLD-RTO: 0 /100 WBC
PH UR STRIP: 5 [PH] (ref 5–8)
PLATELET # BLD AUTO: 237 K/UL (ref 150–450)
PMV BLD AUTO: 10.2 FL (ref 9.2–12.9)
POTASSIUM SERPL-SCNC: 4.5 MMOL/L (ref 3.5–5.1)
PROT SERPL-MCNC: 8.2 G/DL (ref 6–8.4)
PROT UR QL STRIP: NEGATIVE
RBC # BLD AUTO: 4.72 M/UL (ref 4–5.4)
RBC #/AREA URNS HPF: 9 /HPF (ref 0–4)
SODIUM SERPL-SCNC: 135 MMOL/L (ref 136–145)
SP GR UR STRIP: 1.03 (ref 1–1.03)
SPECIMEN SOURCE: ABNORMAL
SPECIMEN SOURCE: ABNORMAL
SQUAMOUS #/AREA URNS HPF: 6 /HPF
T VAGINALIS GENITAL QL WET PREP: ABNORMAL
T VAGINALIS GENITAL QL WET PREP: ABNORMAL
URN SPEC COLLECT METH UR: ABNORMAL
UROBILINOGEN UR STRIP-ACNC: NEGATIVE EU/DL
WBC # BLD AUTO: 7.55 K/UL (ref 3.9–12.7)
WBC #/AREA URNS HPF: 35 /HPF (ref 0–5)
WBC #/AREA VAG WET PREP: ABNORMAL
WBC #/AREA VAG WET PREP: ABNORMAL
WBC CLUMPS URNS QL MICRO: ABNORMAL
YEAST GENITAL QL WET PREP: ABNORMAL
YEAST GENITAL QL WET PREP: ABNORMAL
YEAST URNS QL MICRO: 0

## 2022-10-12 PROCEDURE — 80053 COMPREHEN METABOLIC PANEL: CPT | Performed by: NURSE PRACTITIONER

## 2022-10-12 PROCEDURE — 96365 THER/PROPH/DIAG IV INF INIT: CPT

## 2022-10-12 PROCEDURE — 87086 URINE CULTURE/COLONY COUNT: CPT | Performed by: NURSE PRACTITIONER

## 2022-10-12 PROCEDURE — 87210 SMEAR WET MOUNT SALINE/INK: CPT | Mod: 91 | Performed by: NURSE PRACTITIONER

## 2022-10-12 PROCEDURE — 63600175 PHARM REV CODE 636 W HCPCS: Performed by: NURSE PRACTITIONER

## 2022-10-12 PROCEDURE — 25000003 PHARM REV CODE 250: Performed by: NURSE PRACTITIONER

## 2022-10-12 PROCEDURE — 99284 EMERGENCY DEPT VISIT MOD MDM: CPT | Mod: 25

## 2022-10-12 PROCEDURE — 85025 COMPLETE CBC W/AUTO DIFF WBC: CPT | Performed by: NURSE PRACTITIONER

## 2022-10-12 PROCEDURE — 81000 URINALYSIS NONAUTO W/SCOPE: CPT | Performed by: NURSE PRACTITIONER

## 2022-10-12 RX ORDER — METRONIDAZOLE 500 MG/1
500 TABLET ORAL 3 TIMES DAILY
Qty: 21 TABLET | Refills: 0 | Status: SHIPPED | OUTPATIENT
Start: 2022-10-12 | End: 2022-10-19

## 2022-10-12 RX ORDER — CEPHALEXIN 500 MG/1
500 CAPSULE ORAL EVERY 12 HOURS
Qty: 20 CAPSULE | Refills: 0 | Status: SHIPPED | OUTPATIENT
Start: 2022-10-12 | End: 2022-10-17

## 2022-10-12 RX ADMIN — CEFTRIAXONE 1 G: 1 INJECTION, POWDER, FOR SOLUTION INTRAMUSCULAR; INTRAVENOUS at 02:10

## 2022-10-12 RX ADMIN — SODIUM CHLORIDE 1000 ML: 9 INJECTION, SOLUTION INTRAVENOUS at 02:10

## 2022-10-12 NOTE — ED PROVIDER NOTES
"Encounter Date: 10/12/2022       History     Chief Complaint   Patient presents with    Vaginal Itching     Burning, stinging, pain. States has yeast infection.      Patient complains of vaginal burning for several days.  Has not taken anything for this problem    The history is provided by the patient.   Review of patient's allergies indicates:   Allergen Reactions    Morphine      Reports "headache"  Other reaction(s): HEADACHE     Past Medical History:   Diagnosis Date    Anxiety     Depression     Diabetes mellitus type II     Diverticulitis 2012    History of abnormal cervical Pap smear     HSV infection     Hyperlipidemia     Hypertension      Past Surgical History:   Procedure Laterality Date     SECTION      x3    COLONOSCOPY  2019    normal    ESOPHAGOGASTRODUODENOSCOPY N/A 2021    Procedure: EGD (ESOPHAGOGASTRODUODENOSCOPY);  Surgeon: Prerna Poole MD;  Location: [facility];  Service: Endoscopy;  Laterality: N/A;    LIPOMA RESECTION      TOTAL ABDOMINAL HYSTERECTOMY W/ BILATERAL SALPINGOOPHORECTOMY Bilateral 2005    dyplasia     Family History   Problem Relation Age of Onset    Heart disease Mother         CAD    Diabetes Mother     Hypertension Mother     Hyperlipidemia Mother     COPD Mother     COPD Father     Diabetes Father     Hyperlipidemia Father     Hypertension Father     Heart disease Father         MI    Colon cancer Neg Hx      Social History     Tobacco Use    Smoking status: Never    Smokeless tobacco: Never   Substance Use Topics    Alcohol use: Yes     Comment: occasionally    Drug use: No     Review of Systems   Constitutional:  Negative for fever.   HENT:  Negative for sore throat.    Respiratory:  Negative for shortness of breath.    Cardiovascular:  Negative for chest pain.   Gastrointestinal:  Negative for nausea.   Genitourinary:  Negative for dysuria.   Musculoskeletal:  Negative for back pain.   Skin:  Negative for rash.   Neurological:  Negative for weakness. "   Hematological:  Does not bruise/bleed easily.     Physical Exam     Initial Vitals [10/12/22 1241]   BP Pulse Resp Temp SpO2   (!) 170/79 98 16 98.1 °F (36.7 °C) 97 %      MAP       --         Physical Exam    Nursing note and vitals reviewed.  Constitutional: She appears well-developed and well-nourished. She is not diaphoretic. She is active.  Non-toxic appearance. No distress.   HENT:   Head: Normocephalic and atraumatic.   Eyes: Conjunctivae are normal. Right eye exhibits no discharge. Left eye exhibits no discharge. No scleral icterus.   Neck:   Normal range of motion.  Cardiovascular:  Normal rate, regular rhythm and intact distal pulses.           No murmur heard.  Pulmonary/Chest: Breath sounds normal. No respiratory distress. She has no wheezes.   Abdominal: She exhibits no distension.   Musculoskeletal:         General: No tenderness. Normal range of motion.      Cervical back: Normal range of motion.     Neurological: She is alert and oriented to person, place, and time. No cranial nerve deficit. GCS score is 15. GCS eye subscore is 4. GCS verbal subscore is 5. GCS motor subscore is 6.   Skin: Skin is warm and dry. Capillary refill takes less than 2 seconds. No rash noted.   Psychiatric: She has a normal mood and affect. Her behavior is normal. Judgment and thought content normal.       ED Course   Procedures  Labs Reviewed   VAGINAL SCREEN - Abnormal; Notable for the following components:       Result Value    WBC - Vaginal Screen Rare (*)     Bacteria - Vaginal Screen Moderate (*)     All other components within normal limits    Narrative:     Release to patient->Immediate   VAGINAL SCREEN - Abnormal; Notable for the following components:    Clue Cells Rare (*)     WBC - Vaginal Screen Many (*)     Bacteria - Vaginal Screen Many (*)     All other components within normal limits    Narrative:     Initial self swab done incorrectly  Release to patient->Immediate   URINALYSIS, REFLEX TO URINE CULTURE -  Abnormal; Notable for the following components:    Color, UA Colorless (*)     Glucose, UA 4+ (*)     Occult Blood UA Trace (*)     Leukocytes, UA 3+ (*)     All other components within normal limits    Narrative:     Specimen Source->Urine   URINALYSIS MICROSCOPIC - Abnormal; Notable for the following components:    RBC, UA 9 (*)     WBC, UA 35 (*)     WBC Clumps, UA Few (*)     All other components within normal limits    Narrative:     Specimen Source->Urine   CBC W/ AUTO DIFFERENTIAL - Abnormal; Notable for the following components:    Immature Granulocytes 0.9 (*)     Immature Grans (Abs) 0.07 (*)     All other components within normal limits   COMPREHENSIVE METABOLIC PANEL - Abnormal; Notable for the following components:    Sodium 135 (*)     CO2 22 (*)     Glucose 381 (*)     All other components within normal limits   CULTURE, URINE    Narrative:     Specimen Source->Urine          Imaging Results    None          Medications   sodium chloride 0.9% bolus 1,000 mL (0 mLs Intravenous Stopped 10/12/22 1531)   cefTRIAXone (ROCEPHIN) 1 g/50 mL D5W IVPB (0 g Intravenous Stopped 10/12/22 1501)     Medical Decision Making:   Explained to the patient that her blood sugars are running too high and that she uses see her primary care as soon as possible for further diabetes management                        Clinical Impression:   Final diagnoses:  [N76.0, B96.89] Bacterial vaginosis (Primary)  [R73.9] Hyperglycemia  [N30.01] Acute cystitis with hematuria        ED Disposition Condition    Discharge Stable          ED Prescriptions       Medication Sig Dispense Start Date End Date Auth. Provider    cephALEXin (KEFLEX) 500 MG capsule () Take 1 capsule (500 mg total) by mouth every 12 (twelve) hours. for 5 days 20 capsule 10/12/2022 10/17/2022 Jimi Hickey NP    metroNIDAZOLE (FLAGYL) 500 MG tablet () Take 1 tablet (500 mg total) by mouth 3 (three) times daily. for 7 days 21 tablet 10/12/2022  10/19/2022 Jimi Hickey NP          Follow-up Information       Follow up With Specialties Details Why Contact Info    [facility] Schedule an appointment as soon as possible for a visit today  [address] [address] [address]  [phone]               Jimi Hickey NP  10/12/22 1600       Jimi Hickey NP  01/04/23 1207

## 2022-10-12 NOTE — Clinical Note
"Yue "Yuepat Serrato was seen and treated in our emergency department on 10/12/2022.  She may return to work on 10/13/2022.       If you have any questions or concerns, please don't hesitate to call.      Jimi Hickey NP"

## 2022-10-14 LAB
BACTERIA UR CULT: NORMAL
BACTERIA UR CULT: NORMAL

## 2022-11-28 ENCOUNTER — NURSE TRIAGE (OUTPATIENT)
Dept: ADMINISTRATIVE | Facility: CLINIC | Age: 52
End: 2022-11-28
Payer: MEDICAID

## 2022-11-29 NOTE — TELEPHONE ENCOUNTER
Patient is calling with question of low body temp . Het temp was 94.1 to 96.? I asked her is she doing anything to warm up she stated she was going to cover up in bed.  I told her I would need another temp and I needed to complete her triage and she could tell me what the repeat temp. Would be.   I advised patient I would need further information from her and she states she was just going to go to the ED if she felt she needed it.  Patient refused call back    Reason for Disposition   RN needs further essential information from caller in order to complete triage     I advised patient I would need further information from her and she states she was just going to go to the ED if she felt she needed it.  Patient refused call back    Additional Information   Negative: [1] Caller is not with the adult (patient) AND [2] reporting urgent symptoms   Negative: Lab result questions   Negative: Medication questions   Negative: Caller can't be reached by phone   Negative: Caller has already spoken to PCP or another triager    Protocols used: Information Only Call - No Triage-A-     33

## 2022-11-30 ENCOUNTER — OFFICE VISIT (OUTPATIENT)
Dept: URGENT CARE | Facility: CLINIC | Age: 52
End: 2022-11-30
Payer: MEDICAID

## 2022-11-30 VITALS
OXYGEN SATURATION: 97 % | HEIGHT: 63 IN | TEMPERATURE: 98 F | RESPIRATION RATE: 18 BRPM | SYSTOLIC BLOOD PRESSURE: 171 MMHG | BODY MASS INDEX: 30.12 KG/M2 | DIASTOLIC BLOOD PRESSURE: 87 MMHG | HEART RATE: 90 BPM | WEIGHT: 170 LBS

## 2022-11-30 DIAGNOSIS — N39.0 URINARY TRACT INFECTION WITHOUT HEMATURIA, SITE UNSPECIFIED: ICD-10-CM

## 2022-11-30 DIAGNOSIS — R68.83 CHILLS: ICD-10-CM

## 2022-11-30 DIAGNOSIS — R09.81 NASAL CONGESTION: ICD-10-CM

## 2022-11-30 DIAGNOSIS — R68.89 FLU-LIKE SYMPTOMS: Primary | ICD-10-CM

## 2022-11-30 DIAGNOSIS — R81 GLYCOSURIA: ICD-10-CM

## 2022-11-30 DIAGNOSIS — Z20.828 EXPOSURE TO THE FLU: ICD-10-CM

## 2022-11-30 DIAGNOSIS — R30.0 DYSURIA: ICD-10-CM

## 2022-11-30 DIAGNOSIS — R10.9 FLANK PAIN: ICD-10-CM

## 2022-11-30 LAB
BILIRUB UR QL STRIP: NEGATIVE
CTP QC/QA: YES
GLUCOSE SERPL-MCNC: 183 MG/DL (ref 70–110)
GLUCOSE UR QL STRIP: POSITIVE
KETONES UR QL STRIP: NEGATIVE
LEUKOCYTE ESTERASE UR QL STRIP: POSITIVE
PH, POC UA: 5
POC BLOOD, URINE: NEGATIVE
POC MOLECULAR INFLUENZA A AGN: NEGATIVE
POC MOLECULAR INFLUENZA B AGN: NEGATIVE
POC NITRATES, URINE: NEGATIVE
PROT UR QL STRIP: NEGATIVE
SP GR UR STRIP: 1.03 (ref 1–1.03)
UROBILINOGEN UR STRIP-ACNC: NORMAL (ref 0.1–1.1)

## 2022-11-30 PROCEDURE — 82962 GLUCOSE BLOOD TEST: CPT | Mod: S$GLB,,,

## 2022-11-30 PROCEDURE — 81003 POCT URINALYSIS, DIPSTICK, AUTOMATED, W/O SCOPE: ICD-10-PCS | Mod: QW,S$GLB,,

## 2022-11-30 PROCEDURE — 3008F PR BODY MASS INDEX (BMI) DOCUMENTED: ICD-10-PCS | Mod: CPTII,S$GLB,,

## 2022-11-30 PROCEDURE — 99214 OFFICE O/P EST MOD 30 MIN: CPT | Mod: S$GLB,,,

## 2022-11-30 PROCEDURE — 87502 POCT INFLUENZA A/B MOLECULAR: ICD-10-PCS | Mod: QW,S$GLB,,

## 2022-11-30 PROCEDURE — 3079F PR MOST RECENT DIASTOLIC BLOOD PRESSURE 80-89 MM HG: ICD-10-PCS | Mod: CPTII,S$GLB,,

## 2022-11-30 PROCEDURE — 99214 PR OFFICE/OUTPT VISIT, EST, LEVL IV, 30-39 MIN: ICD-10-PCS | Mod: S$GLB,,,

## 2022-11-30 PROCEDURE — 3077F SYST BP >= 140 MM HG: CPT | Mod: CPTII,S$GLB,,

## 2022-11-30 PROCEDURE — 3008F BODY MASS INDEX DOCD: CPT | Mod: CPTII,S$GLB,,

## 2022-11-30 PROCEDURE — 1159F PR MEDICATION LIST DOCUMENTED IN MEDICAL RECORD: ICD-10-PCS | Mod: CPTII,S$GLB,,

## 2022-11-30 PROCEDURE — 81003 URINALYSIS AUTO W/O SCOPE: CPT | Mod: QW,S$GLB,,

## 2022-11-30 PROCEDURE — 87502 INFLUENZA DNA AMP PROBE: CPT | Mod: QW,S$GLB,,

## 2022-11-30 PROCEDURE — 1159F MED LIST DOCD IN RCRD: CPT | Mod: CPTII,S$GLB,,

## 2022-11-30 PROCEDURE — 87088 URINE BACTERIA CULTURE: CPT

## 2022-11-30 PROCEDURE — 87147 CULTURE TYPE IMMUNOLOGIC: CPT

## 2022-11-30 PROCEDURE — 3077F PR MOST RECENT SYSTOLIC BLOOD PRESSURE >= 140 MM HG: ICD-10-PCS | Mod: CPTII,S$GLB,,

## 2022-11-30 PROCEDURE — 3079F DIAST BP 80-89 MM HG: CPT | Mod: CPTII,S$GLB,,

## 2022-11-30 PROCEDURE — 1160F PR REVIEW ALL MEDS BY PRESCRIBER/CLIN PHARMACIST DOCUMENTED: ICD-10-PCS | Mod: CPTII,S$GLB,,

## 2022-11-30 PROCEDURE — 1160F RVW MEDS BY RX/DR IN RCRD: CPT | Mod: CPTII,S$GLB,,

## 2022-11-30 PROCEDURE — 82962 POCT GLUCOSE, HAND-HELD DEVICE: ICD-10-PCS | Mod: S$GLB,,,

## 2022-11-30 PROCEDURE — 87086 URINE CULTURE/COLONY COUNT: CPT

## 2022-11-30 RX ORDER — NITROFURANTOIN 25; 75 MG/1; MG/1
100 CAPSULE ORAL 2 TIMES DAILY
Qty: 10 CAPSULE | Refills: 0 | Status: SHIPPED | OUTPATIENT
Start: 2022-11-30 | End: 2022-12-05

## 2022-11-30 RX ORDER — OSELTAMIVIR PHOSPHATE 75 MG/1
75 CAPSULE ORAL 2 TIMES DAILY
Qty: 10 CAPSULE | Refills: 0 | Status: SHIPPED | OUTPATIENT
Start: 2022-11-30 | End: 2022-12-05

## 2022-11-30 NOTE — LETTER
November 30, 2022      [facility]  [address], [address]  [facility] [place] [address]  Phone: [phone]  Fax: [phone]       Patient: Yue Serrato   YOB: 1970  Date of Visit: 11/30/2022    To Whom It May Concern:    Elvira Serrato  was at [facility] on 11/30/2022. The patient may return to work/school on 12/1/22 with no restrictions. If you have any questions or concerns, or if I can be of further assistance, please do not hesitate to contact me.    Sincerely,          Ashley Clarke PA-C

## 2022-11-30 NOTE — PROGRESS NOTES
"Subjective:       Patient ID: Yue Serrato is a 52 y.o. female.    Vitals:  height is 5' 3" (1.6 m) and weight is 77.1 kg (170 lb). Her oral temperature is 97.9 °F (36.6 °C). Her blood pressure is 171/87 (abnormal) and her pulse is 90. Her respiration is 18 and oxygen saturation is 97%.     Chief Complaint: Nasal Congestion    Yue Serrato is presenting with sxs of congestion which began 2 days ago. Patient reports other associated sxs of generalized body aches and chills, cough, HA, ear pain, and rhinorrhea. She has been taking otc oral and spray decongestants. She was exposed to the flu via grandchildren. She did have the flu in June of this year and states her symptoms feel the same currently. She tolerated tamiflu well at that time.     Patient also reports she has been having odor in her urine along with frequency, and lower back pain. She denies any burning when she urinates.      Sinus Problem  This is a new problem. The current episode started yesterday. There has been no fever. Associated symptoms include chills, congestion, coughing, ear pain, headaches, a hoarse voice, sneezing and a sore throat. Pertinent negatives include no sinus pressure. Past treatments include oral decongestants and spray decongestants. The treatment provided no relief.     Constitution: Positive for chills.   HENT:  Positive for ear pain, congestion and sore throat. Negative for sinus pressure.    Respiratory:  Positive for cough.    Allergic/Immunologic: Positive for sneezing.   Neurological:  Positive for headaches.     Objective:      Physical Exam   Constitutional: She is oriented to person, place, and time. She appears well-developed. She is cooperative.  Non-toxic appearance. She does not appear ill. No distress.   HENT:   Head: Normocephalic and atraumatic.   Ears:   Right Ear: Hearing, tympanic membrane, external ear and ear canal normal.   Left Ear: Hearing, tympanic membrane, external ear and ear canal normal. "   Nose: Nose normal. No mucosal edema, rhinorrhea or nasal deformity. No epistaxis. Right sinus exhibits no maxillary sinus tenderness and no frontal sinus tenderness. Left sinus exhibits no maxillary sinus tenderness and no frontal sinus tenderness.   Mouth/Throat: Uvula is midline, oropharynx is clear and moist and mucous membranes are normal. No trismus in the jaw. Normal dentition. No uvula swelling. No oropharyngeal exudate, posterior oropharyngeal edema or posterior oropharyngeal erythema.   Eyes: Conjunctivae and lids are normal. No scleral icterus.   Neck: Trachea normal and phonation normal. Neck supple. No edema present. No erythema present. No neck rigidity present.   Cardiovascular: Normal rate, regular rhythm, normal heart sounds and normal pulses.   Pulmonary/Chest: Effort normal and breath sounds normal. No respiratory distress. She has no decreased breath sounds. She has no rhonchi.   Abdominal: Normal appearance. There is no left CVA tenderness and no right CVA tenderness.      Comments: No CVAT   Musculoskeletal: Normal range of motion.         General: No deformity. Normal range of motion.   Neurological: She is alert and oriented to person, place, and time. She exhibits normal muscle tone. Coordination normal.   Skin: Skin is warm, dry, intact, not diaphoretic and not pale.   Psychiatric: Her speech is normal and behavior is normal. Judgment and thought content normal.   Nursing note and vitals reviewed.      Results for orders placed or performed in visit on 11/30/22   POCT Influenza A/B MOLECULAR   Result Value Ref Range    POC Molecular Influenza A Ag Negative Negative, Not Reported    POC Molecular Influenza B Ag Negative Negative, Not Reported     Acceptable Yes    POCT Urinalysis, Dipstick, Automated, W/O Scope   Result Value Ref Range    POC Blood, Urine Negative Negative    POC Bilirubin, Urine Negative Negative    POC Urobilinogen, Urine Normal 0.1 - 1.1    POC Ketones,  Urine Negative Negative    POC Protein, Urine Negative Negative    POC Nitrates, Urine Negative Negative    POC Glucose, Urine Positive (A) Negative    pH, UA 5.0     POC Specific Gravity, Urine 1.030 (A) 1.003 - 1.029    POC Leukocytes, Urine Positive (A) Negative   POCT Glucose, Hand-Held Device   Result Value Ref Range    POC Glucose 183 (A) 70 - 110 MG/DL       Assessment:       1. Flu-like symptoms    2. Nasal congestion    3. Dysuria    4. Glycosuria    5. Flank pain    6. Chills    7. Urinary tract infection without hematuria, site unspecified    8. Exposure to the flu          Plan:         Discussed option of empirically treating flu given negative test with exposure and flu like symptoms. Patient would like to treat with tamiflu again. Tamiflu sent to pharmacy  UA with + leuks. Urine culture sent due to subjective complaints of flank pain and chills. Macrobid sent to pharmacy   Return to clinic if symptoms worsen, persisit, or change.   Red flag signs/sx that warrants ED evaluation discussed with patient/parent who verbalized understanding      Flu-like symptoms  -     oseltamivir (TAMIFLU) 75 MG capsule; Take 1 capsule (75 mg total) by mouth 2 (two) times daily. for 5 days  Dispense: 10 capsule; Refill: 0    Nasal congestion  -     POCT Influenza A/B MOLECULAR    Dysuria  -     POCT Urinalysis, Dipstick, Automated, W/O Scope    Glycosuria  -     POCT Glucose, Hand-Held Device    Flank pain  -     CULTURE, URINE    Chills  -     CULTURE, URINE    Urinary tract infection without hematuria, site unspecified  -     nitrofurantoin, macrocrystal-monohydrate, (MACROBID) 100 MG capsule; Take 1 capsule (100 mg total) by mouth 2 (two) times daily. for 5 days  Dispense: 10 capsule; Refill: 0    Exposure to the flu  -     oseltamivir (TAMIFLU) 75 MG capsule; Take 1 capsule (75 mg total) by mouth 2 (two) times daily. for 5 days  Dispense: 10 capsule; Refill: 0

## 2022-12-02 LAB — BACTERIA UR CULT: ABNORMAL

## 2022-12-03 ENCOUNTER — TELEPHONE (OUTPATIENT)
Dept: URGENT CARE | Facility: CLINIC | Age: 52
End: 2022-12-03
Payer: MEDICAID

## 2022-12-03 DIAGNOSIS — J06.9 VIRAL URI: Primary | ICD-10-CM

## 2022-12-03 DIAGNOSIS — N30.01 ACUTE CYSTITIS WITH HEMATURIA: Primary | ICD-10-CM

## 2022-12-03 RX ORDER — AMOXICILLIN 500 MG/1
500 CAPSULE ORAL EVERY 12 HOURS
Qty: 10 CAPSULE | Refills: 0 | Status: SHIPPED | OUTPATIENT
Start: 2022-12-03 | End: 2022-12-08

## 2022-12-03 RX ORDER — PROMETHAZINE HYDROCHLORIDE AND DEXTROMETHORPHAN HYDROBROMIDE 6.25; 15 MG/5ML; MG/5ML
5 SYRUP ORAL EVERY 4 HOURS PRN
Qty: 180 ML | Refills: 0 | Status: SHIPPED | OUTPATIENT
Start: 2022-12-03 | End: 2022-12-13

## 2022-12-03 NOTE — TELEPHONE ENCOUNTER
Called patient to discuss culture results.  States that on day 4 of antibiotics she still not feeling much relief of her urinary tract symptoms.  She is encouraged to stop Macrobid and I have sent in a prescription of Amoxil for her.  I encouraged her to hydrate and rest.  She verbalizes understanding of and agreement with this plan

## 2023-02-09 ENCOUNTER — HOSPITAL ENCOUNTER (EMERGENCY)
Facility: HOSPITAL | Age: 53
Discharge: HOME | End: 2023-02-09
Attending: EMERGENCY MEDICINE
Payer: COMMERCIAL

## 2023-02-09 VITALS
TEMPERATURE: 98 F | OXYGEN SATURATION: 96 % | RESPIRATION RATE: 18 BRPM | HEIGHT: 63 IN | BODY MASS INDEX: 30.94 KG/M2 | WEIGHT: 174.63 LBS | HEART RATE: 91 BPM | SYSTOLIC BLOOD PRESSURE: 151 MMHG | DIASTOLIC BLOOD PRESSURE: 87 MMHG

## 2023-02-09 DIAGNOSIS — K21.9 GASTROESOPHAGEAL REFLUX DISEASE WITHOUT ESOPHAGITIS: Primary | ICD-10-CM

## 2023-02-09 LAB
ALBUMIN SERPL BCP-MCNC: 4.3 G/DL (ref 3.5–5.2)
ALP SERPL-CCNC: 68 U/L (ref 55–135)
ALT SERPL W/O P-5'-P-CCNC: 18 U/L (ref 10–44)
ANION GAP SERPL CALC-SCNC: 14 MMOL/L (ref 8–16)
AST SERPL-CCNC: 16 U/L (ref 10–40)
BASOPHILS # BLD AUTO: 0.05 K/UL (ref 0–0.2)
BASOPHILS NFR BLD: 0.7 % (ref 0–1.9)
BILIRUB SERPL-MCNC: 0.6 MG/DL (ref 0.1–1)
BUN SERPL-MCNC: 16 MG/DL (ref 6–20)
CALCIUM SERPL-MCNC: 10.2 MG/DL (ref 8.7–10.5)
CHLORIDE SERPL-SCNC: 103 MMOL/L (ref 95–110)
CO2 SERPL-SCNC: 20 MMOL/L (ref 23–29)
CREAT SERPL-MCNC: 0.8 MG/DL (ref 0.5–1.4)
DIFFERENTIAL METHOD: ABNORMAL
EOSINOPHIL # BLD AUTO: 0.1 K/UL (ref 0–0.5)
EOSINOPHIL NFR BLD: 1.9 % (ref 0–8)
ERYTHROCYTE [DISTWIDTH] IN BLOOD BY AUTOMATED COUNT: 12.2 % (ref 11.5–14.5)
EST. GFR  (NO RACE VARIABLE): >60 ML/MIN/1.73 M^2
GLUCOSE SERPL-MCNC: 207 MG/DL (ref 70–110)
HCT VFR BLD AUTO: 41.5 % (ref 37–48.5)
HGB BLD-MCNC: 14 G/DL (ref 12–16)
IMM GRANULOCYTES # BLD AUTO: 0.12 K/UL (ref 0–0.04)
IMM GRANULOCYTES NFR BLD AUTO: 1.7 % (ref 0–0.5)
LIPASE SERPL-CCNC: 49 U/L (ref 4–60)
LYMPHOCYTES # BLD AUTO: 2.3 K/UL (ref 1–4.8)
LYMPHOCYTES NFR BLD: 33 % (ref 18–48)
MCH RBC QN AUTO: 28.2 PG (ref 27–31)
MCHC RBC AUTO-ENTMCNC: 33.7 G/DL (ref 32–36)
MCV RBC AUTO: 84 FL (ref 82–98)
MONOCYTES # BLD AUTO: 0.6 K/UL (ref 0.3–1)
MONOCYTES NFR BLD: 7.9 % (ref 4–15)
NEUTROPHILS # BLD AUTO: 3.8 K/UL (ref 1.8–7.7)
NEUTROPHILS NFR BLD: 54.8 % (ref 38–73)
NRBC BLD-RTO: 0 /100 WBC
PLATELET # BLD AUTO: 275 K/UL (ref 150–450)
PMV BLD AUTO: 9.4 FL (ref 9.2–12.9)
POTASSIUM SERPL-SCNC: 4.3 MMOL/L (ref 3.5–5.1)
PROT SERPL-MCNC: 8.3 G/DL (ref 6–8.4)
RBC # BLD AUTO: 4.96 M/UL (ref 4–5.4)
SODIUM SERPL-SCNC: 137 MMOL/L (ref 136–145)
WBC # BLD AUTO: 7 K/UL (ref 3.9–12.7)

## 2023-02-09 PROCEDURE — 80053 COMPREHEN METABOLIC PANEL: CPT | Performed by: EMERGENCY MEDICINE

## 2023-02-09 PROCEDURE — 63600175 PHARM REV CODE 636 W HCPCS: Performed by: EMERGENCY MEDICINE

## 2023-02-09 PROCEDURE — 25000003 PHARM REV CODE 250: Performed by: EMERGENCY MEDICINE

## 2023-02-09 PROCEDURE — 83690 ASSAY OF LIPASE: CPT | Performed by: EMERGENCY MEDICINE

## 2023-02-09 PROCEDURE — 85025 COMPLETE CBC W/AUTO DIFF WBC: CPT | Performed by: EMERGENCY MEDICINE

## 2023-02-09 PROCEDURE — 96374 THER/PROPH/DIAG INJ IV PUSH: CPT

## 2023-02-09 PROCEDURE — 96375 TX/PRO/DX INJ NEW DRUG ADDON: CPT

## 2023-02-09 PROCEDURE — 99284 EMERGENCY DEPT VISIT MOD MDM: CPT | Mod: 25

## 2023-02-09 RX ORDER — MAG HYDROX/ALUMINUM HYD/SIMETH 200-200-20
30 SUSPENSION, ORAL (FINAL DOSE FORM) ORAL ONCE
Status: COMPLETED | OUTPATIENT
Start: 2023-02-09 | End: 2023-02-09

## 2023-02-09 RX ORDER — ONDANSETRON 2 MG/ML
4 INJECTION INTRAMUSCULAR; INTRAVENOUS
Status: COMPLETED | OUTPATIENT
Start: 2023-02-09 | End: 2023-02-09

## 2023-02-09 RX ORDER — KETOROLAC TROMETHAMINE 30 MG/ML
15 INJECTION, SOLUTION INTRAMUSCULAR; INTRAVENOUS
Status: COMPLETED | OUTPATIENT
Start: 2023-02-09 | End: 2023-02-09

## 2023-02-09 RX ORDER — FAMOTIDINE 20 MG/1
20 TABLET, FILM COATED ORAL
Status: COMPLETED | OUTPATIENT
Start: 2023-02-09 | End: 2023-02-09

## 2023-02-09 RX ORDER — SUCRALFATE 1 G/1
1 TABLET ORAL 4 TIMES DAILY
Qty: 120 TABLET | Refills: 0 | Status: SHIPPED | OUTPATIENT
Start: 2023-02-09 | End: 2023-03-11

## 2023-02-09 RX ORDER — LIDOCAINE HYDROCHLORIDE 20 MG/ML
15 SOLUTION OROPHARYNGEAL ONCE
Status: COMPLETED | OUTPATIENT
Start: 2023-02-09 | End: 2023-02-09

## 2023-02-09 RX ORDER — HYDROMORPHONE HYDROCHLORIDE 1 MG/ML
1 INJECTION, SOLUTION INTRAMUSCULAR; INTRAVENOUS; SUBCUTANEOUS
Status: COMPLETED | OUTPATIENT
Start: 2023-02-09 | End: 2023-02-09

## 2023-02-09 RX ORDER — METOCLOPRAMIDE HYDROCHLORIDE 5 MG/ML
10 INJECTION INTRAMUSCULAR; INTRAVENOUS
Status: COMPLETED | OUTPATIENT
Start: 2023-02-09 | End: 2023-02-09

## 2023-02-09 RX ORDER — SUCRALFATE 1 G/10ML
1 SUSPENSION ORAL
Status: COMPLETED | OUTPATIENT
Start: 2023-02-09 | End: 2023-02-09

## 2023-02-09 RX ADMIN — ONDANSETRON 4 MG: 2 INJECTION INTRAMUSCULAR; INTRAVENOUS at 07:02

## 2023-02-09 RX ADMIN — LIDOCAINE HYDROCHLORIDE 15 ML: 20 SOLUTION ORAL at 07:02

## 2023-02-09 RX ADMIN — KETOROLAC TROMETHAMINE 15 MG: 30 INJECTION, SOLUTION INTRAMUSCULAR; INTRAVENOUS at 09:02

## 2023-02-09 RX ADMIN — SUCRALFATE 1 G: 1 SUSPENSION ORAL at 09:02

## 2023-02-09 RX ADMIN — HYDROMORPHONE HYDROCHLORIDE 1 MG: 1 INJECTION, SOLUTION INTRAMUSCULAR; INTRAVENOUS; SUBCUTANEOUS at 09:02

## 2023-02-09 RX ADMIN — METOCLOPRAMIDE 10 MG: 5 INJECTION, SOLUTION INTRAMUSCULAR; INTRAVENOUS at 10:02

## 2023-02-09 RX ADMIN — ALUMINUM HYDROXIDE, MAGNESIUM HYDROXIDE, AND DIMETHICONE 30 ML: 200; 20; 200 SUSPENSION ORAL at 07:02

## 2023-02-09 RX ADMIN — FAMOTIDINE 20 MG: 20 TABLET ORAL at 07:02

## 2023-02-09 NOTE — ED PROVIDER NOTES
"SCRIBE #1 NOTE: I, Cain Thornton, am scribing for, and in the presence of, Moses Joel Jr., MD. I have scribed the entire note.      History      Chief Complaint   Patient presents with    Abdominal Pain     Pt presents to ED tonight CO RLQ, umbilical region, for few months. Pos hx H pylori. Denies N/V/D constipation. Finished abx 2-3 days prior.       Review of patient's allergies indicates:   Allergen Reactions    Morphine Other (See Comments)     Reports "headache"  Other reaction(s): HEADACHE        HPI   HPI    2023, 7:02 AM   History obtained from the patient      History of Present Illness: Yue Serrato is a 52 y.o. female patient who presents to the Emergency Department for epigastric abdominal pain, onset last night. Pt states that she finished a course of abx to treat H. Pylori 3 days ago.  She is currently on come Protonix and compliant.  She notes she is frequently burping with foul-smelling burps and epigastric pain.  Symptoms are constant and moderate in severity. No mitigating or exacerbating factors reported. No associated sxs reported. Patient denies any fever, chills, n/v/d, SOB, CP, weakness, numbness, dizziness, headache, and all other sxs at this time. No further complaints or concerns at this time.     Arrival mode: Personal vehicle    PCP: MARIYA Altamirano       Past Medical History:  Past Medical History:   Diagnosis Date    Anxiety     Depression     Diabetes mellitus type II     Diverticulitis 2012    History of abnormal cervical Pap smear     HSV infection     Hyperlipidemia     Hypertension        Past Surgical History:  Past Surgical History:   Procedure Laterality Date     SECTION      x3    COLONOSCOPY  2019    normal    ESOPHAGOGASTRODUODENOSCOPY N/A 2021    Procedure: EGD (ESOPHAGOGASTRODUODENOSCOPY);  Surgeon: Prerna Poole MD;  Location: [facility];  Service: Endoscopy;  Laterality: N/A;    LIPOMA RESECTION      TOTAL ABDOMINAL HYSTERECTOMY W/ BILATERAL " SALPINGOOPHORECTOMY Bilateral 11/2005    dyplasia         Family History:  Family History   Problem Relation Age of Onset    Heart disease Mother         CAD    Diabetes Mother     Hypertension Mother     Hyperlipidemia Mother     COPD Mother     COPD Father     Diabetes Father     Hyperlipidemia Father     Hypertension Father     Heart disease Father         MI    Colon cancer Neg Hx        Social History:  Social History     Tobacco Use    Smoking status: Never    Smokeless tobacco: Never   Substance and Sexual Activity    Alcohol use: Yes     Comment: occasionally    Drug use: No    Sexual activity: Yes     Partners: Male     Birth control/protection: See Surgical Hx     Comment: NIGEL/BSO       ROS   Review of Systems   Constitutional:  Negative for chills and fever.   HENT:  Negative for sore throat.    Respiratory:  Negative for shortness of breath.    Cardiovascular:  Negative for chest pain.   Gastrointestinal:  Positive for abdominal pain (epigastric). Negative for diarrhea, nausea and vomiting.   Genitourinary:  Negative for dysuria.   Musculoskeletal:  Negative for back pain.   Skin:  Negative for rash.   Neurological:  Negative for dizziness, weakness, light-headedness, numbness and headaches.   Hematological:  Does not bruise/bleed easily.   All other systems reviewed and are negative.    Physical Exam      Initial Vitals [02/09/23 0333]   BP Pulse Resp Temp SpO2   (!) 167/79 87 14 97.6 °F (36.4 °C) 97 %      MAP       --          Physical Exam  Nursing Notes and Vital Signs Reviewed.  Constitutional: Patient is in no acute distress. Well-developed and well-nourished.  Head: Atraumatic. Normocephalic.  Eyes:  EOM intact.  No scleral icterus.  ENT: Mucous membranes are moist.  Nares clear   Neck:  Full ROM. No JVD.  Cardiovascular: Regular rate. Regular rhythm No murmurs, rubs, or gallops. Distal pulses are 2+ and symmetric  Pulmonary/Chest: No respiratory distress. Clear to auscultation bilaterally. No  "wheezing or rales.  Equal chest wall rise bilaterally  Abdominal: Soft and non-distended.  There is no tenderness.  No rebound, guarding, or rigidity. Good bowel sounds.  Genitourinary: No CVA tenderness.  No suprapubic tenderness  Musculoskeletal: Moves all extremities. No obvious deformities.  5 x 5 strength in all extremities   Skin: Warm and dry.  Neurological:  Alert, awake, and appropriate.  Normal speech.  No acute focal neurological deficits are appreciated.  Two through 12 intact bilaterally.  Psychiatric: Normal affect. Good eye contact. Appropriate in content.    ED Course    Procedures  ED Vital Signs:  Vitals:    02/09/23 0333 02/09/23 0740 02/09/23 0830 02/09/23 0930   BP: (!) 167/79 (!) 161/84 (!) 144/78 (!) 152/82   Pulse: 87 81 88 85   Resp: 14 16 20 18   Temp: 97.6 °F (36.4 °C)      TempSrc: Oral      SpO2: 97% 97% 96% 97%   Weight: 79.2 kg (174 lb 9.7 oz)      Height: 5' 3" (1.6 m)       02/09/23 0948 02/09/23 1018   BP:     Pulse:     Resp: 18    Temp:  97.7 °F (36.5 °C)   TempSrc:  Oral   SpO2:     Weight:     Height:         Abnormal Lab Results:  Labs Reviewed   CBC W/ AUTO DIFFERENTIAL - Abnormal; Notable for the following components:       Result Value    Immature Granulocytes 1.7 (*)     Immature Grans (Abs) 0.12 (*)     All other components within normal limits   COMPREHENSIVE METABOLIC PANEL - Abnormal; Notable for the following components:    CO2 20 (*)     Glucose 207 (*)     All other components within normal limits   LIPASE        All Lab Results:  Results for orders placed or performed during the hospital encounter of 02/09/23   CBC auto differential   Result Value Ref Range    WBC 7.00 3.90 - 12.70 K/uL    RBC 4.96 4.00 - 5.40 M/uL    Hemoglobin 14.0 12.0 - 16.0 g/dL    Hematocrit 41.5 37.0 - 48.5 %    MCV 84 82 - 98 fL    MCH 28.2 27.0 - 31.0 pg    MCHC 33.7 32.0 - 36.0 g/dL    RDW 12.2 11.5 - 14.5 %    Platelets 275 150 - 450 K/uL    MPV 9.4 9.2 - 12.9 fL    Immature Granulocytes " 1.7 (H) 0.0 - 0.5 %    Gran # (ANC) 3.8 1.8 - 7.7 K/uL    Immature Grans (Abs) 0.12 (H) 0.00 - 0.04 K/uL    Lymph # 2.3 1.0 - 4.8 K/uL    Mono # 0.6 0.3 - 1.0 K/uL    Eos # 0.1 0.0 - 0.5 K/uL    Baso # 0.05 0.00 - 0.20 K/uL    nRBC 0 0 /100 WBC    Gran % 54.8 38.0 - 73.0 %    Lymph % 33.0 18.0 - 48.0 %    Mono % 7.9 4.0 - 15.0 %    Eosinophil % 1.9 0.0 - 8.0 %    Basophil % 0.7 0.0 - 1.9 %    Differential Method Automated    Comprehensive metabolic panel   Result Value Ref Range    Sodium 137 136 - 145 mmol/L    Potassium 4.3 3.5 - 5.1 mmol/L    Chloride 103 95 - 110 mmol/L    CO2 20 (L) 23 - 29 mmol/L    Glucose 207 (H) 70 - 110 mg/dL    BUN 16 6 - 20 mg/dL    Creatinine 0.8 0.5 - 1.4 mg/dL    Calcium 10.2 8.7 - 10.5 mg/dL    Total Protein 8.3 6.0 - 8.4 g/dL    Albumin 4.3 3.5 - 5.2 g/dL    Total Bilirubin 0.6 0.1 - 1.0 mg/dL    Alkaline Phosphatase 68 55 - 135 U/L    AST 16 10 - 40 U/L    ALT 18 10 - 44 U/L    Anion Gap 14 8 - 16 mmol/L    eGFR >60 >60 mL/min/1.73 m^2   Lipase   Result Value Ref Range    Lipase 49 4 - 60 U/L     Imaging Results:  Imaging Results    None                 The Emergency Provider reviewed the vital signs and test results, which are outlined above.    ED Discussion     10:18 AM: Reassessed pt at this time.  Pt states her condition has improved at this time. Discussed with pt all pertinent ED information and results. Discussed pt dx and plan of tx. Gave pt all f/u and return to the ED instructions. All questions and concerns were addressed at this time. Pt expresses understanding of information and instructions, and is comfortable with plan to discharge. Pt is stable for discharge.    I discussed with patient and/or family/caretaker that evaluation in the ED does not suggest any emergent or life threatening medical conditions requiring immediate intervention beyond what was provided in the ED, and I believe patient is safe for discharge.  Regardless, an unremarkable evaluation in the ED  does not preclude the development or presence of a serious of life threatening condition. As such, patient was instructed to return immediately for any worsening or change in current symptoms.         ED Medication(s):  Medications   aluminum-magnesium hydroxide-simethicone 200-200-20 mg/5 mL suspension 30 mL (30 mLs Oral Given 2/9/23 0724)     And   LIDOcaine HCl 2% oral solution 15 mL (15 mLs Oral Given 2/9/23 0724)   famotidine tablet 20 mg (20 mg Oral Given 2/9/23 0724)   ondansetron injection 4 mg (4 mg Intravenous Given 2/9/23 0734)   sucralfate 100 mg/mL suspension 1 g (1 g Oral Given 2/9/23 0903)   ketorolac injection 15 mg (15 mg Intravenous Given 2/9/23 0903)   HYDROmorphone injection 1 mg (1 mg Intravenous Given 2/9/23 0948)   metoclopramide HCl injection 10 mg (10 mg Intravenous Given 2/9/23 1009)        Follow-up Information       MARIYA Altamirano.    Specialty: Family Medicine  Contact information:  [address] [address]  [address] [place] [id number]  [phone]               Sneha Peterson MD.    Specialties: Gastroenterology, Internal Medicine  Contact information:  [address] [place] [address] [phone]                           New Prescriptions    SUCRALFATE (CARAFATE) 1 GRAM TABLET    Take 1 tablet (1 g total) by mouth 4 (four) times daily.         Medical Decision Making    Medical Decision Making:   History:   Old Medical Records: I decided to obtain old medical records.  Old Records Summarized: records from clinic visits.       <> Summary of Records: Charts on H pylori not available in our system.  Clinical Tests:   Lab Tests: Ordered and Reviewed  Patient is stable nontoxic.  She presents with epigastric pain with burping and foul tasting burps with water brash.  She is a history of acid reflux as well as H pylori for which she is just completed antibiotics.  She had some improvement with GI medications although it did not completely resolve.  Subsequently her symptoms  resolved with Dilaudid.  I ordered and reviewed her labs.  She is a mild elevation in her glucose at 2:07 a.m. but a normal gap.  White count is normal.  There is no shift.  Patient has lipase of 49.  Clinically the patient has acid reflux.  I will continue her Protonix and add Carafate.  Discussed with her to use Pepcid and or Tums for breakthrough symptoms.  She is requested the number for GI physician and I have provided her the information for Dr. Peterson.  Patient verbalized agreement understanding with the plan of care and seems reliable.  She is safe for discharge in my opinion.  She will return for symptoms worsen in any way or for any problems questions or concerns for         Scribe Attestation:   Scribe #1: I performed the above scribed service and the documentation accurately describes the services I performed. I attest to the accuracy of the note.    Attending:   Physician Attestation Statement for Scribe #1: I, Moses Joel Jr., MD, personally performed the services described in this documentation, as scribed by Cain Thornton, in my presence, and it is both accurate and complete.          Clinical Impression       ICD-10-CM ICD-9-CM   1. Gastroesophageal reflux disease without esophagitis  K21.9 530.81       Disposition:   Disposition: Discharged  Condition: Stable       Moses Joel Jr., MD  02/09/23 1029

## 2023-02-09 NOTE — Clinical Note
"Yue "Yue" Ama was seen and treated in our emergency department on 2/9/2023.  She may return to work on 02/10/2023.       If you have any questions or concerns, please don't hesitate to call.      Flor LUIS RN    "

## 2023-02-09 NOTE — DISCHARGE INSTRUCTIONS
Continue all of her home medications including your Protonix.  Add Carafate as prescribed.  You may still experience symptoms.  If you do use Pepcid and or Tums.  Follow-up with GI at next available.  Return as needed for any worsening symptoms, problems, questions or concerns.

## 2023-02-14 ENCOUNTER — OFFICE VISIT (OUTPATIENT)
Dept: GASTROENTEROLOGY | Facility: CLINIC | Age: 53
End: 2023-02-14
Payer: MEDICAID

## 2023-02-14 VITALS
WEIGHT: 173.31 LBS | SYSTOLIC BLOOD PRESSURE: 130 MMHG | DIASTOLIC BLOOD PRESSURE: 76 MMHG | OXYGEN SATURATION: 99 % | HEART RATE: 95 BPM | HEIGHT: 63 IN | BODY MASS INDEX: 30.71 KG/M2

## 2023-02-14 DIAGNOSIS — R10.11 RUQ PAIN: ICD-10-CM

## 2023-02-14 DIAGNOSIS — Z86.19 HISTORY OF HELICOBACTER PYLORI INFECTION: Primary | ICD-10-CM

## 2023-02-14 DIAGNOSIS — R19.7 DIARRHEA, UNSPECIFIED TYPE: ICD-10-CM

## 2023-02-14 DIAGNOSIS — R10.84 GENERALIZED ABDOMINAL PAIN: ICD-10-CM

## 2023-02-14 PROCEDURE — 3078F DIAST BP <80 MM HG: CPT | Mod: CPTII,,, | Performed by: PHYSICIAN ASSISTANT

## 2023-02-14 PROCEDURE — 3008F BODY MASS INDEX DOCD: CPT | Mod: CPTII,,, | Performed by: PHYSICIAN ASSISTANT

## 2023-02-14 PROCEDURE — 1159F PR MEDICATION LIST DOCUMENTED IN MEDICAL RECORD: ICD-10-PCS | Mod: CPTII,,, | Performed by: PHYSICIAN ASSISTANT

## 2023-02-14 PROCEDURE — 3008F PR BODY MASS INDEX (BMI) DOCUMENTED: ICD-10-PCS | Mod: CPTII,,, | Performed by: PHYSICIAN ASSISTANT

## 2023-02-14 PROCEDURE — 3075F PR MOST RECENT SYSTOLIC BLOOD PRESS GE 130-139MM HG: ICD-10-PCS | Mod: CPTII,,, | Performed by: PHYSICIAN ASSISTANT

## 2023-02-14 PROCEDURE — 1159F MED LIST DOCD IN RCRD: CPT | Mod: CPTII,,, | Performed by: PHYSICIAN ASSISTANT

## 2023-02-14 PROCEDURE — 99214 OFFICE O/P EST MOD 30 MIN: CPT | Mod: S$PBB,,, | Performed by: PHYSICIAN ASSISTANT

## 2023-02-14 PROCEDURE — 3075F SYST BP GE 130 - 139MM HG: CPT | Mod: CPTII,,, | Performed by: PHYSICIAN ASSISTANT

## 2023-02-14 PROCEDURE — 99213 OFFICE O/P EST LOW 20 MIN: CPT | Mod: PBBFAC | Performed by: PHYSICIAN ASSISTANT

## 2023-02-14 PROCEDURE — 99999 PR PBB SHADOW E&M-EST. PATIENT-LVL III: ICD-10-PCS | Mod: PBBFAC,,, | Performed by: PHYSICIAN ASSISTANT

## 2023-02-14 PROCEDURE — 99999 PR PBB SHADOW E&M-EST. PATIENT-LVL III: CPT | Mod: PBBFAC,,, | Performed by: PHYSICIAN ASSISTANT

## 2023-02-14 PROCEDURE — 99214 PR OFFICE/OUTPT VISIT, EST, LEVL IV, 30-39 MIN: ICD-10-PCS | Mod: S$PBB,,, | Performed by: PHYSICIAN ASSISTANT

## 2023-02-14 PROCEDURE — 3078F PR MOST RECENT DIASTOLIC BLOOD PRESSURE < 80 MM HG: ICD-10-PCS | Mod: CPTII,,, | Performed by: PHYSICIAN ASSISTANT

## 2023-02-14 NOTE — PROGRESS NOTES
Subjective:      Patient ID: Yue Serrato is a 52 y.o. female.    Chief Complaint: Abdominal Pain (Upper right quadrant pain) and Gas    HPI:  Patient reports to clinic today for evaluation of the above. Last seen in 2021 by our service but is now to me.  Right upper abdomen pain, can also be epigastric. Sulfur belches. Been off and on for 5-6 months. Comes and goes.   Reported to the ED last month for similar symptoms with no findings.   Bowels - alternate between constipation and diarrhea. Can occasionally be normal. No blood in the stool.   Diagnosed with H. Pylori a couple months ago - diagnosed with breath test. Just finished abx a week or 2 ago. Has been treated twice. Has not been checked to see if resolved.  Reports occasional indigestion. Biggest upper gI complaint is belching.  Rumbling and gurgling of the abdomen.  Last colonoscopy about 1 year ago at [facility]. 10 year recall.  Occasional nausea and vomiting.    On Ozempic but just started about 1 month ago.   Experiences dysphagia if she doesn't take PPI.  -EGD in 2021 with Grade A esophagitis and candida.    Review of Systems   Constitutional:  Positive for appetite change (decrease). Negative for activity change, chills, diaphoresis, fatigue and fever.   HENT:  Positive for trouble swallowing (if she misses PPI).    Respiratory:  Negative for cough and shortness of breath.    Cardiovascular:  Negative for chest pain.   Gastrointestinal:  Positive for abdominal pain, constipation, diarrhea, nausea and vomiting. Negative for abdominal distention, anal bleeding and blood in stool.   Genitourinary:  Negative for dysuria.   Musculoskeletal:  Negative for arthralgias and back pain.   Skin:  Negative for color change and pallor.   Neurological:  Negative for dizziness, weakness and light-headedness.   Psychiatric/Behavioral:  Negative for dysphoric mood. The patient is nervous/anxious.      Medical History: Reviewed    Social History:  Reviewed    Allergies: Reviewed    Objective:     Physical Exam  Constitutional:       General: She is not in acute distress.     Appearance: Normal appearance. She is not ill-appearing, toxic-appearing or diaphoretic.   HENT:      Head: Normocephalic and atraumatic.   Eyes:      General: No scleral icterus.     Extraocular Movements: Extraocular movements intact.   Cardiovascular:      Rate and Rhythm: Normal rate and regular rhythm.   Pulmonary:      Effort: Pulmonary effort is normal. No respiratory distress.   Abdominal:      General: Bowel sounds are increased. There is no distension.      Palpations: Abdomen is soft. There is no mass.      Tenderness: There is abdominal tenderness (RUQ and substernal. Worse to RUQ). There is no guarding.      Hernia: A hernia (diastasis recti) is present.   Musculoskeletal:         General: Normal range of motion.      Cervical back: Normal range of motion.      Right lower leg: No edema.      Left lower leg: No edema.   Skin:     General: Skin is warm and dry.      Coloration: Skin is not jaundiced or pale.   Neurological:      Mental Status: She is alert and oriented to person, place, and time.       Assessment:     1. History of Helicobacter pylori infection    2. Generalized abdominal pain    3. Diarrhea, unspecified type    4. RUQ pain        Plan:     -Suspicious of gallbladder etiology given the RUQ pain. Schedule for US.   -If normal, consider Xray of abdomen.   -Discussed rechecking H. Pylori stool, however, patient does not want to come off of Protonix, as her dysphagia and reflux symptoms significantly worsen. If normal US, can repeat EGD with biopsies.   -continue Protonix  -GERD diet      Yue was seen today for abdominal pain and gas.    Diagnoses and all orders for this visit:    History of Helicobacter pylori infection  -     Cancel: H. pylori antigen, stool; Future    Generalized abdominal pain  -     Ambulatory referral/consult to Gastroenterology  -      Cancel: H. pylori antigen, stool; Future  -     US Abdomen Limited (GALLBLADDER)    Diarrhea, unspecified type  -     Ambulatory referral/consult to Gastroenterology  -     US Abdomen Limited (GALLBLADDER)    RUQ pain  -     US Abdomen Limited (GALLBLADDER)        No follow-ups on file.    Thank you for the opportunity to participate in the care of this patient.   Christin Cortez PA-C.

## 2023-02-16 ENCOUNTER — HOSPITAL ENCOUNTER (OUTPATIENT)
Dept: RADIOLOGY | Facility: HOSPITAL | Age: 53
Discharge: HOME | End: 2023-02-16
Attending: PHYSICIAN ASSISTANT
Payer: MEDICAID

## 2023-02-16 PROCEDURE — 76705 ECHO EXAM OF ABDOMEN: CPT | Mod: 26,,, | Performed by: RADIOLOGY

## 2023-02-16 PROCEDURE — 76705 ECHO EXAM OF ABDOMEN: CPT | Mod: TC

## 2023-02-16 PROCEDURE — 76705 US ABDOMEN LIMITED: ICD-10-PCS | Mod: 26,,, | Performed by: RADIOLOGY

## 2023-02-17 DIAGNOSIS — K76.0 FATTY LIVER: Primary | ICD-10-CM

## 2023-02-20 ENCOUNTER — TELEPHONE (OUTPATIENT)
Dept: HEPATOLOGY | Facility: CLINIC | Age: 53
End: 2023-02-20
Payer: COMMERCIAL

## 2023-02-20 NOTE — TELEPHONE ENCOUNTER
Patient fibroscan has been scheduled for 2/21/23 at 1am. Patient is aware to fast 4 hours prior to visit and arrive 15 minutes tot he visit time.

## 2023-02-20 NOTE — TELEPHONE ENCOUNTER
----- Message from Roshni Douglas MA sent at 2/20/2023 10:49 AM CST -----  Regarding: NEEDS FIBROSCAN  Please reach out to patient and schedule.    US Elastography Liver [[id number]] (Order [id number])

## 2023-02-21 ENCOUNTER — PROCEDURE VISIT (OUTPATIENT)
Dept: HEPATOLOGY | Facility: CLINIC | Age: 53
End: 2023-02-21
Attending: PHYSICIAN ASSISTANT
Payer: MEDICAID

## 2023-02-21 VITALS
DIASTOLIC BLOOD PRESSURE: 76 MMHG | BODY MASS INDEX: 30.5 KG/M2 | HEART RATE: 92 BPM | WEIGHT: 172.19 LBS | SYSTOLIC BLOOD PRESSURE: 130 MMHG

## 2023-02-21 DIAGNOSIS — K76.0 FATTY LIVER: ICD-10-CM

## 2023-02-21 DIAGNOSIS — K21.00 GASTROESOPHAGEAL REFLUX DISEASE WITH ESOPHAGITIS WITHOUT HEMORRHAGE: ICD-10-CM

## 2023-02-21 DIAGNOSIS — Z86.19 HISTORY OF HELICOBACTER PYLORI INFECTION: Primary | ICD-10-CM

## 2023-02-21 DIAGNOSIS — R19.7 DIARRHEA, UNSPECIFIED TYPE: ICD-10-CM

## 2023-02-21 DIAGNOSIS — R10.11 RUQ PAIN: ICD-10-CM

## 2023-02-21 DIAGNOSIS — B37.81 CANDIDA INFECTION, ESOPHAGEAL: ICD-10-CM

## 2023-02-21 PROCEDURE — 76981 PR US, ELASTOGRAPHY, PARENCHYMA: ICD-10-PCS | Mod: 26,S$PBB,, | Performed by: NURSE PRACTITIONER

## 2023-02-21 PROCEDURE — 76981 USE PARENCHYMA: CPT | Mod: 26,S$PBB,, | Performed by: NURSE PRACTITIONER

## 2023-02-21 PROCEDURE — 76981 USE PARENCHYMA: CPT | Mod: PBBFAC | Performed by: NURSE PRACTITIONER

## 2023-02-21 NOTE — PROGRESS NOTES
Patient presented in clinic today for fibroscan examination of their liver. Patient verbalized that they have fasted 4 hours prior to their examination. Patient denies being pregnant or having any active implantable metal devices; such as a pacemaker, defibrillator, or pump.     Wendy Ritter, SEBASTIANN, RN   Registered Nurse Lead- Hepatology   [facility] [facility]

## 2023-02-21 NOTE — PROCEDURES
Fibroscan Procedure     Name: Yue Serrato  Date of Procedure : 2023   :: MARIYA Valle  Diagnosis: NAFLD    Probe: M    Fibroscan readin.4 KPa    Fibrosis:F 0-1     CAP readin dB/m    Steatosis: :S3       Interpretation:   Severe steatosis without fibrosis

## 2023-02-23 ENCOUNTER — PATIENT MESSAGE (OUTPATIENT)
Dept: GASTROENTEROLOGY | Facility: CLINIC | Age: 53
End: 2023-02-23
Payer: COMMERCIAL

## 2023-03-01 ENCOUNTER — HOSPITAL ENCOUNTER (OUTPATIENT)
Dept: PREADMISSION TESTING | Facility: HOSPITAL | Age: 53
Discharge: HOME | End: 2023-03-01
Attending: SURGERY
Payer: MEDICAID

## 2023-03-01 DIAGNOSIS — K21.00 GASTROESOPHAGEAL REFLUX DISEASE WITH ESOPHAGITIS WITHOUT HEMORRHAGE: ICD-10-CM

## 2023-03-01 DIAGNOSIS — Z86.19 HISTORY OF HELICOBACTER PYLORI INFECTION: ICD-10-CM

## 2023-03-08 ENCOUNTER — HOSPITAL ENCOUNTER (OUTPATIENT)
Dept: PREADMISSION TESTING | Facility: HOSPITAL | Age: 53
Discharge: HOME | End: 2023-03-08
Attending: INTERNAL MEDICINE
Payer: COMMERCIAL

## 2023-03-08 DIAGNOSIS — R19.7 DIARRHEA, UNSPECIFIED TYPE: ICD-10-CM

## 2023-03-08 DIAGNOSIS — B37.81 CANDIDA INFECTION, ESOPHAGEAL: ICD-10-CM

## 2023-03-08 DIAGNOSIS — Z86.19 HISTORY OF HELICOBACTER PYLORI INFECTION: Primary | ICD-10-CM

## 2023-03-08 DIAGNOSIS — R10.11 RUQ PAIN: ICD-10-CM

## 2023-03-08 DIAGNOSIS — K21.00 GASTROESOPHAGEAL REFLUX DISEASE WITH ESOPHAGITIS WITHOUT HEMORRHAGE: ICD-10-CM

## 2023-03-08 DIAGNOSIS — R10.11 RUQ PAIN: Primary | ICD-10-CM

## 2023-03-20 ENCOUNTER — ANESTHESIA (OUTPATIENT)
Dept: ENDOSCOPY | Facility: HOSPITAL | Age: 53
End: 2023-03-20
Payer: MEDICAID

## 2023-03-20 ENCOUNTER — HOSPITAL ENCOUNTER (OUTPATIENT)
Facility: HOSPITAL | Age: 53
Discharge: HOME | End: 2023-03-20
Attending: INTERNAL MEDICINE | Admitting: INTERNAL MEDICINE
Payer: MEDICAID

## 2023-03-20 ENCOUNTER — ANESTHESIA EVENT (OUTPATIENT)
Dept: ENDOSCOPY | Facility: HOSPITAL | Age: 53
End: 2023-03-20
Payer: MEDICAID

## 2023-03-20 VITALS
OXYGEN SATURATION: 99 % | HEIGHT: 62 IN | DIASTOLIC BLOOD PRESSURE: 77 MMHG | SYSTOLIC BLOOD PRESSURE: 131 MMHG | HEART RATE: 75 BPM | TEMPERATURE: 97 F | WEIGHT: 170.88 LBS | BODY MASS INDEX: 31.45 KG/M2 | RESPIRATION RATE: 20 BRPM

## 2023-03-20 DIAGNOSIS — K21.9 GERD (GASTROESOPHAGEAL REFLUX DISEASE): ICD-10-CM

## 2023-03-20 PROBLEM — K21.00 GASTROESOPHAGEAL REFLUX DISEASE WITH ESOPHAGITIS WITHOUT HEMORRHAGE: Status: ACTIVE | Noted: 2023-03-20

## 2023-03-20 LAB — POCT GLUCOSE: 127 MG/DL (ref 70–110)

## 2023-03-20 PROCEDURE — 43239 EGD BIOPSY SINGLE/MULTIPLE: CPT | Mod: ,,, | Performed by: INTERNAL MEDICINE

## 2023-03-20 PROCEDURE — 88342 IMHCHEM/IMCYTCHM 1ST ANTB: CPT | Mod: 26,,, | Performed by: PATHOLOGY

## 2023-03-20 PROCEDURE — 88305 TISSUE EXAM BY PATHOLOGIST: CPT | Performed by: PATHOLOGY

## 2023-03-20 PROCEDURE — 00731 ANES UPR GI NDSC PX NOS: CPT | Performed by: INTERNAL MEDICINE

## 2023-03-20 PROCEDURE — 88305 TISSUE EXAM BY PATHOLOGIST: ICD-10-PCS | Mod: 26,,, | Performed by: PATHOLOGY

## 2023-03-20 PROCEDURE — 43239 EGD BIOPSY SINGLE/MULTIPLE: CPT | Performed by: INTERNAL MEDICINE

## 2023-03-20 PROCEDURE — 37000008 HC ANESTHESIA 1ST 15 MINUTES: Performed by: INTERNAL MEDICINE

## 2023-03-20 PROCEDURE — D9220A PRA ANESTHESIA: ICD-10-PCS | Mod: ,,, | Performed by: NURSE ANESTHETIST, CERTIFIED REGISTERED

## 2023-03-20 PROCEDURE — 25000003 PHARM REV CODE 250: Performed by: NURSE ANESTHETIST, CERTIFIED REGISTERED

## 2023-03-20 PROCEDURE — 88342 CHG IMMUNOCYTOCHEMISTRY: ICD-10-PCS | Mod: 26,,, | Performed by: PATHOLOGY

## 2023-03-20 PROCEDURE — 88305 TISSUE EXAM BY PATHOLOGIST: CPT | Mod: 26,,, | Performed by: PATHOLOGY

## 2023-03-20 PROCEDURE — D9220A PRA ANESTHESIA: Mod: ,,, | Performed by: NURSE ANESTHETIST, CERTIFIED REGISTERED

## 2023-03-20 PROCEDURE — 37000009 HC ANESTHESIA EA ADD 15 MINS: Performed by: INTERNAL MEDICINE

## 2023-03-20 PROCEDURE — 63600175 PHARM REV CODE 636 W HCPCS: Performed by: INTERNAL MEDICINE

## 2023-03-20 PROCEDURE — 27201012 HC FORCEPS, HOT/COLD, DISP: Performed by: INTERNAL MEDICINE

## 2023-03-20 PROCEDURE — 43239 PR EGD, FLEX, W/BIOPSY, SGL/MULTI: ICD-10-PCS | Mod: ,,, | Performed by: INTERNAL MEDICINE

## 2023-03-20 PROCEDURE — 63600175 PHARM REV CODE 636 W HCPCS: Performed by: NURSE ANESTHETIST, CERTIFIED REGISTERED

## 2023-03-20 RX ORDER — PROPOFOL 10 MG/ML
VIAL (ML) INTRAVENOUS
Status: DISCONTINUED | OUTPATIENT
Start: 2023-03-20 | End: 2023-03-20

## 2023-03-20 RX ORDER — LIDOCAINE HYDROCHLORIDE 20 MG/ML
INJECTION, SOLUTION EPIDURAL; INFILTRATION; INTRACAUDAL; PERINEURAL
Status: DISCONTINUED | OUTPATIENT
Start: 2023-03-20 | End: 2023-03-20

## 2023-03-20 RX ORDER — SODIUM CHLORIDE, SODIUM LACTATE, POTASSIUM CHLORIDE, CALCIUM CHLORIDE 600; 310; 30; 20 MG/100ML; MG/100ML; MG/100ML; MG/100ML
INJECTION, SOLUTION INTRAVENOUS CONTINUOUS
Status: DISCONTINUED | OUTPATIENT
Start: 2023-03-20 | End: 2023-03-20 | Stop reason: HOSPADM

## 2023-03-20 RX ADMIN — LIDOCAINE HYDROCHLORIDE 100 MG: 20 INJECTION, SOLUTION EPIDURAL; INFILTRATION; INTRACAUDAL; PERINEURAL at 07:03

## 2023-03-20 RX ADMIN — PROPOFOL 100 MG: 10 INJECTION, EMULSION INTRAVENOUS at 07:03

## 2023-03-20 RX ADMIN — PROPOFOL 20 MG: 10 INJECTION, EMULSION INTRAVENOUS at 07:03

## 2023-03-20 RX ADMIN — SODIUM CHLORIDE, SODIUM LACTATE, POTASSIUM CHLORIDE, AND CALCIUM CHLORIDE: 600; 310; 30; 20 INJECTION, SOLUTION INTRAVENOUS at 07:03

## 2023-03-20 RX ADMIN — PROPOFOL 30 MG: 10 INJECTION, EMULSION INTRAVENOUS at 07:03

## 2023-03-20 NOTE — DISCHARGE SUMMARY
[facility]  Discharge Note  Short Stay    Procedure(s) (LRB):  EGD (ESOPHAGOGASTRODUODENOSCOPY) (N/A)      OUTCOME: Condition has improved and patient is now ready for discharge.    DISPOSITION: Home or Self Care    FINAL DIAGNOSIS:  <principal problem not specified>    FOLLOWUP: With primary care provider    DISCHARGE [id number]  :  No discharge procedures on file.     TIME SPENT ON DISCHARGE: 20 minutes

## 2023-03-20 NOTE — TRANSFER OF CARE
"Anesthesia Transfer of Care Note    Patient: Yue Serrato    Procedure(s) Performed: Procedure(s) (LRB):  EGD (ESOPHAGOGASTRODUODENOSCOPY) (N/A)    Patient location: PACU    Anesthesia Type: general    Transport from OR: Transported from OR on room air with adequate spontaneous ventilation    Post pain: adequate analgesia    Post assessment: no apparent anesthetic complications    Post vital signs: stable    Level of consciousness: awake    Nausea/Vomiting: no nausea/vomiting    Complications: none    Transfer of care protocol was followed      Last vitals:   Visit Vitals  BP (!) 157/80   Pulse 82   Temp 36.6 °C (97.8 °F) (Temporal)   Resp 16   Ht 5' 2" (1.575 m)   Wt 77.5 kg (170 lb 13.7 oz)   SpO2 99%   Breastfeeding No   BMI 31.25 kg/m²     "

## 2023-03-20 NOTE — ANESTHESIA POSTPROCEDURE EVALUATION
Anesthesia Post Evaluation    Patient: Yue Serrato    Procedure(s) Performed: Procedure(s) (LRB):  EGD (ESOPHAGOGASTRODUODENOSCOPY) (N/A)    Final Anesthesia Type: general      Patient location during evaluation: PACU  Patient participation: Yes- Able to Participate  Level of consciousness: awake and alert and oriented  Post-procedure vital signs: reviewed and stable  Pain management: adequate  Airway patency: patent    PONV status at discharge: No PONV  Anesthetic complications: no      Cardiovascular status: blood pressure returned to baseline, stable and hemodynamically stable  Respiratory status: unassisted  Hydration status: euvolemic  Follow-up not needed.          Vitals Value Taken Time   /77 03/20/23 0818   Temp 36.1 °C (97 °F) 03/20/23 0757   Pulse 74 03/20/23 0825   Resp 21 03/20/23 0823   SpO2 98 % 03/20/23 0825   Vitals shown include unvalidated device data.      Event Time   Out of Recovery 08:28:00         Pain/Breezy Score: Breezy Score: 10 (3/20/2023  8:18 AM)

## 2023-03-20 NOTE — ANESTHESIA PREPROCEDURE EVALUATION
2023  Yue Serrato is a 53 y.o., female.  Patient Active Problem List   Diagnosis    Diabetes mellitus    Dyslipidemia    Psoriasis    ROXANNE (generalized anxiety disorder)    Hypertension associated with diabetes    Obese    Genital herpes in women    Oropharyngeal dysphagia     Past Surgical History:   Procedure Laterality Date     SECTION      x3    COLONOSCOPY  2019    normal    ESOPHAGOGASTRODUODENOSCOPY N/A 2021    Procedure: EGD (ESOPHAGOGASTRODUODENOSCOPY);  Surgeon: Prerna Poole MD;  Location: [facility];  Service: Endoscopy;  Laterality: N/A;    LIPOMA RESECTION      TOTAL ABDOMINAL HYSTERECTOMY W/ BILATERAL SALPINGOOPHORECTOMY Bilateral 2005    dyplasia       Pre-op Assessment    I have reviewed the Patient Summary Reports.     I have reviewed the Nursing Notes. I have reviewed the NPO Status.   I have reviewed the Medications.     Review of Systems  Anesthesia Hx:  No problems with previous Anesthesia  Denies Family Hx of Anesthesia complications.   Denies Personal Hx of Anesthesia complications.   Social:  Alcohol Use, Non-Smoker    Hematology/Oncology:  Hematology Normal   Oncology Normal     EENT/Dental:EENT/Dental Normal   Cardiovascular:   Hypertension Denies MI.  Denies CAD.     Denies Angina. hyperlipidemia    Pulmonary:  Pulmonary Normal    Renal/:  Renal/ Normal     Hepatic/GI:   GERD Dysphagia    Musculoskeletal:  Musculoskeletal Normal    Neurological:  Neurology Normal    Endocrine:   Diabetes, poorly controlled, type 2  Obesity / BMI > 30  Dermatological:  Skin Normal    Psych:   Psychiatric History anxiety depression          Physical Exam  General: Well nourished, Cooperative, Alert and Oriented    Airway:  Mouth Opening: Normal  TM Distance: Normal  Tongue: Normal  Neck ROM: Normal ROM    Dental:  Edentulous, Dentures  Full dentures  removed  Chest/Lungs:  Normal Respiratory Rate    Heart:  Rate: Normal  Rhythm: Regular Rhythm        Anesthesia Plan  Type of Anesthesia, risks & benefits discussed:    Anesthesia Type: Gen Natural Airway  Intra-op Monitoring Plan: Standard ASA Monitors  Post Op Pain Control Plan: multimodal analgesia  Induction:  IV  Informed Consent: Informed consent signed with the Patient and all parties understand the risks and agree with anesthesia plan.  All questions answered. Patient consented to blood products? No  ASA Score: 3  Day of Surgery Review of History & Physical: H&P Update referred to the surgeon/provider.    Ready For Surgery From Anesthesia Perspective.     .

## 2023-03-20 NOTE — [employer] PATIENT INSTRUCTIONS
Discharge Summary/Instructions after an Endoscopic Procedure  Patient Name: Yue Serrato  Patient MRN: [id number]  Patient YOB: 1970 Monday, March 20, 2023  Hector Reyes MD  Dear patient,  As a result of recent federal legislation (The Federal Cures Act), you may   receive lab or pathology results from your procedure in your [facility]   account before your physician is able to contact you. Your physician or   their representative will relay the results to you with their   recommendations at their soonest availability.  Thank you,  RESTRICTIONS:  During your procedure today, you received medications for sedation.  These   medications may affect your judgment, balance and coordination.  Therefore,   for 24 hours, you have the following restrictions:   - DO NOT drive a car, operate machinery, make legal/financial decisions,   sign important papers or drink alcohol.    ACTIVITY:  Today: no heavy lifting, straining or running due to procedural   sedation/anesthesia.  The following day: return to full activity including work.  DIET:  Eat and drink normally unless instructed otherwise.     TREATMENT FOR COMMON SIDE EFFECTS:  - Mild abdominal pain, nausea, belching, bloating or excessive gas:  rest,   eat lightly and use a heating pad.  - Sore Throat: treat with throat lozenges and/or gargle with warm salt   water.  - Because air was used during the procedure, expelling large amounts of air   from your rectum or belching is normal.  - If a bowel prep was taken, you may not have a bowel movement for 1-3 days.    This is normal.  SYMPTOMS TO WATCH FOR AND REPORT TO YOUR PHYSICIAN:  1. Abdominal pain or bloating, other than gas cramps.  2. Chest pain.  3. Back pain.  4. Signs of infection such as: chills or fever occurring within 24 hours   after the procedure.  5. Rectal bleeding, which would show as bright red, maroon, or black stools.   (A tablespoon of blood from the rectum is not serious, especially  if   hemorrhoids are present.)  6. Vomiting.  7. Weakness or dizziness.  GO DIRECTLY TO THE NEAREST EMERGENCY ROOM IF YOU HAVE ANY OF THE FOLLOWING:      Difficulty breathing              Chills and/or fever over 101 F   Persistent vomiting and/or vomiting blood   Severe abdominal pain   Severe chest pain   Black, tarry stools   Bleeding- more than one tablespoon   Any other symptom or condition that you feel may need urgent attention  Your doctor recommends these additional instructions:  If any biopsies were taken, your doctors clinic will contact you in 1 to 2   weeks with any results.  - Discharge patient to home (via wheelchair).   - Resume previous diet.   - Continue present medications.   - Await pathology results.   - Return to referring physician as previously scheduled.   - The findings and recommendations were discussed with the patient.  For questions, problems or results please call your physician Hector Reyes MD at Work:  [phone]  If you have any questions about the above instructions, call the GI   department at [phone] or call the endoscopy unit at [phone]   from 7am until 3 pm.  [facility] [place], EMERGENCY ROOM PHONE NUMBER:   [phone]  IF A COMPLICATION OR EMERGENCY SITUATION ARISES AND YOU ARE UNABLE TO REACH   YOUR PHYSICIAN - GO DIRECTLY TO THE EMERGENCY ROOM.  I have read or have had read to me these discharge instructions for my   procedure and have received a written copy.  I understand these   instructions and will follow-up with my physician if I have any questions.     __________________________________       _____________________________________  Nurse Signature                                          Patient/Designated   Responsible Party Signature  MD Hector Maddox MD  3/20/2023 7:56:35 AM  This report has been verified and signed electronically.  Dear patient,  As a result of recent federal  legislation (The Federal Cures Act), you may   receive lab or pathology results from your procedure in your [facility]   account before your physician is able to contact you. Your physician or   their representative will relay the results to you with their   recommendations at their soonest availability.  Thank you,  [employer]

## 2023-03-20 NOTE — H&P
"Endoscopy History and Physical    PCP - MARIYA Altamirano  Referring Physician - Hector Reyes MD  [address] [address] [address]      ASA - per anesthesia  Mallampati - per anesthesia  History of Anesthesia problems - no  Family history Anesthesia problems -  no   Plan of anesthesia - General    HPI  53 y.o. female    Planned Procedure: EGD  Diagnosis: abdominal pain  Chief Complaint: Same as above      ROS:  Constitutional: No fevers, chills, No weight loss  CV: No chest pain  Pulm: No cough, No shortness of breath  GI: see HPI    Medical History:  has a past medical history of Anxiety, Depression, Diabetes mellitus type II, Diverticulitis (), History of abnormal cervical Pap smear, HSV infection, Hyperlipidemia, and Hypertension.    Surgical History:  has a past surgical history that includes  section; Lipoma resection; Esophagogastroduodenoscopy (N/A, 2021); Colonoscopy (); and Total abdominal hysterectomy w/ bilateral salpingoophorectomy (Bilateral, 2005).    Family History: family history includes COPD in her father and mother; Diabetes in her father and mother; Heart disease in her father and mother; Hyperlipidemia in her father and mother; Hypertension in her father and mother..    Social History:  reports that she has never smoked. She has never used smokeless tobacco. She reports current alcohol use. She reports that she does not use drugs.    Review of patient's allergies indicates:   Allergen Reactions    Morphine Other (See Comments)     Reports "headache"  Other reaction(s): HEADACHE       Medications:   Medications Prior to Admission   Medication Sig Dispense Refill Last Dose    albuterol (PROVENTIL/VENTOLIN HFA) 90 mcg/actuation inhaler albuterol sulfate Inhale 2 puff(s) (inhalation) every 4 hours for 7 days 2021 HFA aerosol inhaler every 4 hours inhalation 7 days suspended 90 mcg/actuation   3/19/2023    ALPRAZolam (XANAX) 0.25 MG tablet Take 0.25 mg " by mouth.   Past Month    atorvastatin (LIPITOR) 10 MG tablet Take 10 mg by mouth once daily.   3/19/2023    cyclobenzaprine (FLEXERIL) 10 MG tablet Take 10 mg by mouth 3 (three) times daily.   3/19/2023    erlotinib HCl (ERLOTINIB ORAL) erlotinib Take No date recorded No form recorded No frequency recorded No route recorded No set duration recorded No set duration amount recorded active No dosage strength recorded No dosage strength units of measure recorded   3/19/2023    fenofibrate 160 MG Tab Take 160 mg by mouth once daily.   3/19/2023    fluticasone propionate (FLONASE) 50 mcg/actuation nasal spray by Each Nostril route.   3/19/2023    gabapentin (NEURONTIN) 300 MG capsule Take 300 mg by mouth 2 (two) times daily.   3/19/2023    Lactobacillus acidophilus (PROBIOTIC) 10 billion cell Cap Take 1 capsule by mouth once daily. 30 capsule 0 3/19/2023    meloxicam (MOBIC) 15 MG tablet    3/19/2023    naproxen (NAPROSYN) 500 MG tablet Take 500 mg by mouth 2 (two) times daily.   3/19/2023    pantoprazole (PROTONIX) 40 MG tablet Take 1 tablet (40 mg total) by mouth once daily. 30 tablet 3 3/19/2023    promethazine (PHENERGAN) 25 MG tablet Take 25 mg by mouth every 6 (six) hours as needed.   Past Month    traMADoL (ULTRAM) 50 mg tablet    Past Week    TRESIBA FLEXTOUCH U-200 200 unit/mL (3 mL) InPn INJECT 100 UNITS SUBCUTANEOUSLY ONCE DAILY  5 3/19/2023    valACYclovir (VALTREX) 1000 MG tablet Take 1,000 mg by mouth 3 (three) times daily.   3/19/2023    acetaminophen (TYLENOL) 500 MG tablet Tylenol Take @0700 No date recorded No form recorded No frequency recorded No route recorded No set duration recorded No set duration amount recorded suspended No dosage strength recorded No dosage strength units of measure recorded       blood sugar diagnostic (ACCU-CHEK SMARTVIEW TEST STRIP) Strp Check BS fastin and 2 hrs after biggest meal 60 strip 0     estradioL (VAGIFEM) 10 mcg Tab Place 1 tablet (10 mcg total) vaginally twice a  week. (Patient not taking: Reported on 2/14/2023) 8 tablet 11     FLUoxetine 20 MG capsule Take 60 mg by mouth.       lancets (ACCU-CHEK SOFTCLIX LANCETS) Misc Check BS fastin and 2 hrs after biggest meal 60 each 0     naproxen sodium (ANAPROX) 220 MG tablet Aleve Take No date recorded No form recorded No frequency recorded No route recorded No set duration recorded No set duration amount recorded suspended No dosage strength recorded No dosage strength units of measure recorded       nystatin (MYCOSTATIN) cream Apply topically 2 (two) times daily. for 7 days 30 g 0     ONETOUCH DELICA PLUS LANCET 33 gauge Misc Apply topically.       ONETOUCH VERIO FLEX METER Misc use as directed       OZEMPIC 1 mg/dose (4 mg/3 mL) Inject 1 mg into the skin every 7 days.   3/12/2023    TRULICITY 1.5 mg/0.5 mL PnIj INJECT 1 & 1 2 (ONE & ONE HALF) MG SUBCUTANEOUSLY ONCE A WEEK  5        Physical Exam:    Vital Signs:   Vitals:    03/20/23 0707   BP: (!) 157/80   Pulse:    Resp:    Temp:        General Appearance: Well appearing in no acute distress  Abdomen: Soft, non tender, non distended with normal bowel sounds, no masses    Labs:  Lab Results   Component Value Date    WBC 7.00 02/09/2023    HGB 14.0 02/09/2023    HCT 41.5 02/09/2023     02/09/2023    CHOL 223 (H) 12/12/2013    TRIG 232 (H) 12/12/2013    HDL 48 12/12/2013    ALT 18 02/09/2023    AST 16 02/09/2023     02/09/2023    K 4.3 02/09/2023     02/09/2023    CREATININE 0.8 02/09/2023    BUN 16 02/09/2023    CO2 20 (L) 02/09/2023    TSH 1.758 09/10/2013    INR 0.9 06/14/2018    HGBA1C 9.9 (H) 12/12/2013       I have explained the risks and benefits of this endoscopic procedure to the patient including but not limited to bleeding, inflammation, infection, perforation, and death.    SEDATION PLAN: per anesthesia       History reviewed, vital signs satisfactory, cardiopulmonary status satisfactory, sedation options, risks and plans have been discussed with the  patient  All their questions were answered and the patient agrees to the sedation procedures as planned and the patient is deemed an appropriate candidate for the sedation as planned.     The risks, benefits and alternatives of the procedure were discussed with the patient in detail. This discussion was had in the presence of endoscopy staff. The risks include, risks of adverse reaction to sedation requiring the use of reversal agents, bleeding requiring blood transfusion, perforation requiring surgical intervention and technical failure. Other risks include aspiration leading to respiratory distress and respiratory failure resulting in endotracheal intubation and mechanical ventilation including death. If anesthesia is being utilized for this procedure, it is up to the anesthesiologist to determine airway safety including elective endotracheal intubation. Questions were answered, they agree to proceed. There was no language barriers.       Procedure explained to patient, informed consent obtained and placed in chart.       Hector Reyes MD

## 2023-03-21 ENCOUNTER — TELEPHONE (OUTPATIENT)
Dept: HEPATOLOGY | Facility: CLINIC | Age: 53
End: 2023-03-21
Payer: COMMERCIAL

## 2023-03-21 NOTE — TELEPHONE ENCOUNTER
Spoke with patient on [phone] in reference to results. Patient is aware to give the provider time to review results and she will hear from someone with the interpretation. Patient voices understanding.    ----- Message from Griselda Ortega sent at 3/21/2023  1:23 PM CDT -----  Contact: SELF/[phone]  Patient is calling to consult with nurse regarding her result from her procedure. Please call her back at [phone]. Thanks/ar

## 2023-03-27 LAB
FINAL PATHOLOGIC DIAGNOSIS: NORMAL
Lab: NORMAL

## 2023-03-28 ENCOUNTER — PATIENT MESSAGE (OUTPATIENT)
Dept: GASTROENTEROLOGY | Facility: CLINIC | Age: 53
End: 2023-03-28
Payer: COMMERCIAL

## 2023-03-28 DIAGNOSIS — R19.8 ABNORMAL FINDINGS ON ESOPHAGOGASTRODUODENOSCOPY (EGD): ICD-10-CM

## 2023-03-28 DIAGNOSIS — R10.11 RUQ PAIN: ICD-10-CM

## 2023-03-28 DIAGNOSIS — R10.84 GENERALIZED ABDOMINAL PAIN: Primary | ICD-10-CM

## 2023-03-28 DIAGNOSIS — K21.9 GASTROESOPHAGEAL REFLUX DISEASE, UNSPECIFIED WHETHER ESOPHAGITIS PRESENT: ICD-10-CM

## 2023-11-04 ENCOUNTER — OFFICE VISIT (OUTPATIENT)
Dept: URGENT CARE | Facility: CLINIC | Age: 53
End: 2023-11-04
Payer: COMMERCIAL

## 2023-11-04 VITALS
TEMPERATURE: 98 F | SYSTOLIC BLOOD PRESSURE: 134 MMHG | BODY MASS INDEX: 30.14 KG/M2 | RESPIRATION RATE: 18 BRPM | WEIGHT: 163.81 LBS | DIASTOLIC BLOOD PRESSURE: 83 MMHG | OXYGEN SATURATION: 97 % | HEIGHT: 62 IN | HEART RATE: 89 BPM

## 2023-11-04 DIAGNOSIS — Z86.69 HISTORY OF SCIATICA: ICD-10-CM

## 2023-11-04 DIAGNOSIS — M54.42 ACUTE LEFT-SIDED LOW BACK PAIN WITH LEFT-SIDED SCIATICA: Primary | ICD-10-CM

## 2023-11-04 PROCEDURE — 99214 PR OFFICE/OUTPT VISIT, EST, LEVL IV, 30-39 MIN: ICD-10-PCS | Mod: S$GLB,,, | Performed by: NURSE PRACTITIONER

## 2023-11-04 PROCEDURE — 99214 OFFICE O/P EST MOD 30 MIN: CPT | Mod: S$GLB,,, | Performed by: NURSE PRACTITIONER

## 2023-11-04 RX ORDER — NAPROXEN 500 MG/1
500 TABLET ORAL 2 TIMES DAILY WITH MEALS
Qty: 20 TABLET | Refills: 0 | Status: SHIPPED | OUTPATIENT
Start: 2023-11-04 | End: 2024-02-07 | Stop reason: ALTCHOICE

## 2023-11-04 RX ORDER — CYCLOBENZAPRINE HCL 10 MG
10 TABLET ORAL 3 TIMES DAILY PRN
Qty: 15 TABLET | Refills: 0 | Status: SHIPPED | OUTPATIENT
Start: 2023-11-04 | End: 2023-11-09

## 2023-11-04 NOTE — PROGRESS NOTES
"Subjective:      Patient ID: Yue Serrato is a 53 y.o. female.    Vitals:  height is 5' 2" (1.575 m) and weight is 74.3 kg (163 lb 12.8 oz). Her oral temperature is 98 °F (36.7 °C). Her blood pressure is 134/83 and her pulse is 89. Her respiration is 18 and oxygen saturation is 97%.     Chief Complaint: Back Pain    Patient is a 52 yo female with a history of sciatica who presents with lower back pain and left sciatica. Onset 3 days. Denies trauma/injury.  Patient is treating pain with Tylenol and home medications with minimal relief.  She denies fever, chills, altered sensation, saddle anesthesia, bowel or bladder incontinence, difficulty walking.  No other concerns were voiced.    Back Pain  This is a recurrent problem. The current episode started in the past 7 days. The pain is at a severity of 4/10. The symptoms are aggravated by bending, lying down, sitting and standing. Associated symptoms include leg pain. Pertinent negatives include no abdominal pain, bladder incontinence, bowel incontinence, dysuria, numbness, pelvic pain or tingling.       Gastrointestinal:  Negative for abdominal pain, nausea, vomiting, diarrhea and bowel incontinence.   Genitourinary:  Negative for dysuria, frequency, urgency, flank pain, bladder incontinence, hematuria and pelvic pain.   Musculoskeletal:  Positive for back pain.   Skin:  Negative for rash.   Neurological:  Negative for numbness and tingling.      Objective:     Physical Exam   Constitutional: She is oriented to person, place, and time. She appears well-developed. She is cooperative. No distress.   HENT:   Head: Normocephalic and atraumatic.   Nose: Nose normal.   Mouth/Throat: Oropharynx is clear and moist and mucous membranes are normal.   Eyes: Conjunctivae and lids are normal.   Neck: Trachea normal and phonation normal. Neck supple.   Cardiovascular: Normal rate, regular rhythm, normal heart sounds and normal pulses.   Pulmonary/Chest: Effort normal and breath " sounds normal.   Abdominal: Normal appearance and bowel sounds are normal. She exhibits no mass. Soft.   Musculoskeletal:         General: No deformity.      Comments: Diffuse left-sided paraspinal tenderness without vertebral point tenderness or step-offs.  Gait steady and symmetric.  Sensation equal bilateral lower extremities distal toes.  DP/PT pulses 2+ bilaterally.  Cap refill brisk in lower extremities.  No joint swelling or rashes noted.   Neurological: She is alert and oriented to person, place, and time. She has normal strength and normal reflexes. No sensory deficit.   Skin: Skin is warm, dry, intact and not diaphoretic.   Psychiatric: Her speech is normal and behavior is normal. Judgment and thought content normal.   Nursing note and vitals reviewed.      Assessment:     1. Acute left-sided low back pain with left-sided sciatica    2. History of sciatica        Plan:       Acute left-sided low back pain with left-sided sciatica    History of sciatica    Other orders  -     cyclobenzaprine (FLEXERIL) 10 MG tablet; Take 1 tablet (10 mg total) by mouth 3 (three) times daily as needed for Muscle spasms.  Dispense: 15 tablet; Refill: 0  -     naproxen (NAPROSYN) 500 MG tablet; Take 1 tablet (500 mg total) by mouth 2 (two) times daily with meals.  Dispense: 20 tablet; Refill: 0          Medical Decision Making:   Initial Assessment:   Nontoxic appearing 52 yo female c/o back pain.  After complete evaluation, including thorough history and physical exam, the patient's symptoms are most likely due to muscular  injury. There are no concerning features on physical exam to suggest fracture (no trauma, no bony tenderness to palpation), cauda equina (no bowel or urinary incontinence/retention, no saddle anesthesia, no distal weakness), AAA, viscus perforation, osteomyelitis or epidural abscess (no IVDU, vertebral tenderness), renal colic, pyelonephritis (afebrile, no CVAT, no urinary symptoms). Vital signs do not  suggest sepsis. Imaging is negative for acute fracture or concerning findings. Patient is without signs of acute distress and vital signs are stable. Patient is discharged with prescription for muscle relaxant, anti-inflammatory pain relief, and recommendations for supportive care. Return/ER precautions were given.     Patient has history of sciatica.           Patient Instructions   Back Pain-Muscle Pain  Alternate heat and ice for first 48 hours then  apply heat. You may do gently stretching if tolerable.    Moist warm compresss to area several times daily.  May use a heating pad on LOW to provide heat over a towel which was dampended with warm water. DO NOT FALL ASLEEP WITH HEATING PAD ON.  Do not stay in one position to long.  When sleeping on your back place a pillow under knees to reduce tension on back.  If sleeping on your side, place pillow between knees to keep spine in better alinement.  Wear supportive shoes such as tennis shoes for support of the lower back.  Take any medication as directed.    Take the muscle relaxer as prescribed. It may cause drowsiness so do not operate a vehicle or machinery while taking this medication.     Apply over the counter Voltaren gel to the area for improved pain relief.     Apply over the counter lidocaine patches to the area for improved pain relief.     Take arthritis strength Tylenol three times a day for pain in addition to the anti-inflammatory pain medication prescribed.     If you were not prescribed an anti-inflammatory medication, and if you do not have any history of stomach/intestinal ulcers, or kidney disease, or are not taking a blood thinner such as Coumadin, Plavix, Pradaxa, Eloquis, or Xaralta for example, it is OK to take over the counter Ibuprofen or Advil or Motrin or Aleve as directed.  Do not take these medications on an empty stomach.    If you were prescribed a narcotic medication, do not drive or operate heavy equipment or machinery while taking  these medications.    If you lose control of your bowel and/or bladder, please go to the nearest Emergency Department immediately.  If you lose sensation in between your legs by your genitalia and/or rectum, please go to the nearest Emergency Department immediately.  If you lose control or sensation of any extremity, please go to the nearest Emergency Department immediately.

## 2023-11-04 NOTE — PATIENT INSTRUCTIONS
Back Pain-Muscle Pain  Alternate heat and ice for first 48 hours then  apply heat. You may do gently stretching if tolerable.    Moist warm compresss to area several times daily.  May use a heating pad on LOW to provide heat over a towel which was dampended with warm water. DO NOT FALL ASLEEP WITH HEATING PAD ON.  Do not stay in one position to long.  When sleeping on your back place a pillow under knees to reduce tension on back.  If sleeping on your side, place pillow between knees to keep spine in better alinement.  Wear supportive shoes such as tennis shoes for support of the lower back.  Take any medication as directed.    Take the muscle relaxer as prescribed. It may cause drowsiness so do not operate a vehicle or machinery while taking this medication.     Apply over the counter Voltaren gel to the area for improved pain relief.     Apply over the counter lidocaine patches to the area for improved pain relief.     Take arthritis strength Tylenol three times a day for pain in addition to the anti-inflammatory pain medication prescribed.     If you were not prescribed an anti-inflammatory medication, and if you do not have any history of stomach/intestinal ulcers, or kidney disease, or are not taking a blood thinner such as Coumadin, Plavix, Pradaxa, Eloquis, or Xaralta for example, it is OK to take over the counter Ibuprofen or Advil or Motrin or Aleve as directed.  Do not take these medications on an empty stomach.    If you were prescribed a narcotic medication, do not drive or operate heavy equipment or machinery while taking these medications.    If you lose control of your bowel and/or bladder, please go to the nearest Emergency Department immediately.  If you lose sensation in between your legs by your genitalia and/or rectum, please go to the nearest Emergency Department immediately.  If you lose control or sensation of any extremity, please go to the nearest Emergency Department immediately.

## 2023-12-14 ENCOUNTER — OFFICE VISIT (OUTPATIENT)
Dept: URGENT CARE | Facility: CLINIC | Age: 53
End: 2023-12-14
Payer: COMMERCIAL

## 2023-12-14 VITALS
RESPIRATION RATE: 18 BRPM | BODY MASS INDEX: 30 KG/M2 | WEIGHT: 163 LBS | SYSTOLIC BLOOD PRESSURE: 127 MMHG | HEART RATE: 96 BPM | TEMPERATURE: 97 F | DIASTOLIC BLOOD PRESSURE: 78 MMHG | HEIGHT: 62 IN | OXYGEN SATURATION: 98 %

## 2023-12-14 DIAGNOSIS — M25.511 ACUTE PAIN OF RIGHT SHOULDER: Primary | ICD-10-CM

## 2023-12-14 DIAGNOSIS — G89.29 CHRONIC RIGHT SHOULDER PAIN: ICD-10-CM

## 2023-12-14 DIAGNOSIS — M25.511 CHRONIC RIGHT SHOULDER PAIN: ICD-10-CM

## 2023-12-14 PROCEDURE — 99213 OFFICE O/P EST LOW 20 MIN: CPT | Mod: S$GLB,,,

## 2023-12-14 PROCEDURE — 99213 PR OFFICE/OUTPT VISIT, EST, LEVL III, 20-29 MIN: ICD-10-PCS | Mod: S$GLB,,,

## 2023-12-14 NOTE — PROGRESS NOTES
"Subjective:      Patient ID: Yue Serrato is a 53 y.o. female.    Vitals:  height is 5' 2" (1.575 m) and weight is 73.9 kg (163 lb). Her oral temperature is 97.4 °F (36.3 °C). Her blood pressure is 127/78 and her pulse is 96. Her respiration is 18 and oxygen saturation is 98%.     Chief Complaint: Shoulder Pain    Yue Serrato is a 53 y.o. female with PMHx of herniated cervical discs who presents for R shoulder pain which onset 2 days ago. Patient notes hx of chronic shoulder pain. Symptoms onset after moving furniture. Pain is mild in severity. Pain does not radiate. Associated sxs include stiffness. Patient denies any fever, chills, SOB, CP, n/v/d, weakness, or numbness/tingling. Prior Tx includes Tylenol/Aleve and heat.     Shoulder Pain   The pain is present in the right shoulder. This is a chronic problem. The current episode started in the past 7 days. Quality: pulling pain. The pain is at a severity of 3/10. Associated symptoms include stiffness. Pertinent negatives include no fever, limited range of motion or numbness. The symptoms are aggravated by heat. She has tried acetaminophen for the symptoms.       Constitution: Negative for chills and fever.   Musculoskeletal:  Positive for joint pain (R shoulder). Negative for pain, trauma, joint swelling, abnormal ROM of joint, muscle cramps and muscle ache.   Neurological:  Negative for numbness and tingling.      Objective:     Physical Exam   Constitutional: She is oriented to person, place, and time. She appears well-developed. She is cooperative. No distress.   HENT:   Head: Normocephalic and atraumatic.   Nose: Nose normal.   Mouth/Throat: Oropharynx is clear and moist and mucous membranes are normal.   Eyes: Conjunctivae and lids are normal.   Neck: Trachea normal and phonation normal. Neck supple.   Cardiovascular: Normal rate, regular rhythm, normal heart sounds and normal pulses.   Pulmonary/Chest: Effort normal and breath sounds normal. "   Abdominal: Normal appearance and bowel sounds are normal. She exhibits no mass. Soft.   Musculoskeletal:         General: No deformity.      Comments: R posterior shoulder tenderness to palpation. No swelling. No deformity. Limited ROM due to pain. 90 flexion. Negative Joaquin's. Negative Drop Arm. 2+ radial pulse. Sensation intact. NVI.   Neurological: She is alert and oriented to person, place, and time. She has normal strength and normal reflexes. No sensory deficit.   Skin: Skin is warm, dry, intact and not diaphoretic.   Psychiatric: Her speech is normal and behavior is normal. Judgment and thought content normal.   Nursing note and vitals reviewed.      Assessment:     1. Acute pain of right shoulder    2. Chronic right shoulder pain        Plan:       Acute pain of right shoulder    Chronic right shoulder pain      No imaging indicated at this time.  Acute on chronic R shoulder pain without injury or trauma.  DDx: rotator cuff, adhesive capsulitis  Recommend RICE therapy.  Toradol IM offered in clinic. Patient declined.   Recommend NSAIDs. Patient has naproxen from prior visit.  Tylenol as needed for breakthrough pain.  Discussed activity modifications.  Discussed stretches to help with pain.   Advised patient to follow up with Orthopedics if symptoms do not resolve in a week or so.  RTC as needed.

## 2023-12-14 NOTE — PATIENT INSTRUCTIONS
PRICE therapy:  Protect the joint with stabilizing brace like a neoprene sleeve or taping.   Rest the extremity--limit activity with this area  Ice 2-3 times a day for 20 minute intervals for first 48 hours or until inflammation has stabilized   Compression to provide support and help prevent swelling  Elevation above heart level to help decrease swelling    Take Naproxen as prescribed.  Follow up with Orthopedics if symptoms do not improve in a week or so.  Apply analgesic rubs or patches such as biofreeze or Tiger Balm.

## 2024-01-01 NOTE — PATIENT INSTRUCTIONS
Infant required intubation in delivery. Placed on SIMV and loaded on caffeine following admission. Admit CXR with diffuse opacities consistent with RDS, cardiac silhouette within normal limits.     Respiratory support:  SIMV -, -  NIPPV -, -, -present  CPAP -; -    Medications:  -present Caffeine  - DART  7/3-, -present Xopenex  7/10-, -present Diuril  7/10-present Pulmicort  , ,  Lasix x 1  -7/15 abbreviated DART    Infant remains on NIPPV, rate 40, 26/8, requiring 25-27% FiO2.    CB.30/70/39/34.7/+6, remains stable.   Comfortable effort on AM exam with mild retractions.     Plan:   Continue NIPPV; wean/support as indicated  CBGs every / and PRN  Repeat CXR as needed  Continue Diuril 20mg/kg BID   Continue pulmicort/xopenex nebulization BID    CPT every 12 hours   PLAN: Lab work POCT rapid strep screen  Advise increase p.o. fluids-- water/juice & rest  Meds:  Phenergan DM  / no refills  Advise complete antibiotics  Simply saline nasal wash to irrigate sinuses and for congestion/runny nose.  Cool mist humidifier/vaporizer.  Practice good handwashing.\  Advise hot tea with honey  Tylenol or Ibuprofen for fever, headache and body aches.  Warm salt water gargles for throat comfort.  Chloraseptic spray or lozenges for throat comfort.  Advise follow up with PCP in 1-2 days for recheck  Advise go to ER if symptoms worsen or fail to improve with treatment.  AVS provided and reviewed with patient including supportive care, follow up, and red flag symptoms.   Patient verbalizes understanding and agrees with treatment plan. Discharged from Urgent Care in stable condition.

## 2024-01-09 ENCOUNTER — HOSPITAL ENCOUNTER (OUTPATIENT)
Dept: RADIOLOGY | Facility: CLINIC | Age: 54
Discharge: HOME | End: 2024-01-09
Attending: NURSE PRACTITIONER
Payer: COMMERCIAL

## 2024-01-09 ENCOUNTER — OFFICE VISIT (OUTPATIENT)
Dept: URGENT CARE | Facility: CLINIC | Age: 54
End: 2024-01-09
Payer: COMMERCIAL

## 2024-01-09 VITALS
RESPIRATION RATE: 20 BRPM | BODY MASS INDEX: 30 KG/M2 | HEART RATE: 98 BPM | SYSTOLIC BLOOD PRESSURE: 137 MMHG | DIASTOLIC BLOOD PRESSURE: 82 MMHG | OXYGEN SATURATION: 97 % | WEIGHT: 163 LBS | TEMPERATURE: 97 F | HEIGHT: 62 IN

## 2024-01-09 DIAGNOSIS — M25.511 ACUTE PAIN OF RIGHT SHOULDER: Primary | ICD-10-CM

## 2024-01-09 DIAGNOSIS — M25.511 ACUTE PAIN OF RIGHT SHOULDER: ICD-10-CM

## 2024-01-09 DIAGNOSIS — M77.8 RIGHT SHOULDER TENDONITIS: ICD-10-CM

## 2024-01-09 PROCEDURE — 99213 OFFICE O/P EST LOW 20 MIN: CPT | Mod: S$GLB,,, | Performed by: NURSE PRACTITIONER

## 2024-01-09 PROCEDURE — 73030 X-RAY EXAM OF SHOULDER: CPT | Mod: RT,S$GLB,, | Performed by: RADIOLOGY

## 2024-01-09 RX ORDER — IBUPROFEN 600 MG/1
600 TABLET ORAL 3 TIMES DAILY
Qty: 21 TABLET | Refills: 0 | Status: SHIPPED | OUTPATIENT
Start: 2024-01-09 | End: 2024-01-16

## 2024-01-09 NOTE — PATIENT INSTRUCTIONS
You have a tendonitis of the right shoulder.    To treat your pain:  600mg ibuprofen every 6 hours or tylenol 650mg every 6 hours as needed for pain. If needed, you can alternate these medications so that you take one medication every 3 hours. For instance, at noon take ibuprofen, then at 3pm take tylenol, then at 6pm take ibuprofen.       Rest area as needed to reduce pain  As pain recedes, begin normal activities slowly as tolerated.      Please arrange follow up with your primary medical clinic as soon as possible. You must understand that you've received an Urgent Care treatment only and that you may be released before all of your medical problems are known or treated. You, the patient, will arrange for follow up as instructed. If your symptoms worsen or fail to improve you should go to the Emergency Room.    WE CANNOT RULE OUT ALL POSSIBLE CAUSES OF YOUR SYMPTOMS IN THE URGENT CARE SETTING PLEASE GO TO THE ER IF YOU FEELS YOUR CONDITION IS WORSENING OR YOU WOULD LIKE EMERGENT EVALUATION.       A referral was placed to ortho today. If you have not received a call in 3 days, call the patient  at [phone] to inquire about your appointment.

## 2024-01-09 NOTE — PROGRESS NOTES
"Subjective:      Patient ID: Yue Serrato is a 53 y.o. female.    Vitals:  height is 5' 2" (1.575 m) and weight is 73.9 kg (163 lb). Her tympanic temperature is 97.4 °F (36.3 °C). Her blood pressure is 137/82 and her pulse is 98. Her respiration is 20 and oxygen saturation is 97%.     Chief Complaint: Other Misc (Right shoulder - Entered by patient) and Shoulder Pain    Patient is a 53 year female who presents for evaluation of right shoulder pain.  Of note she was seen approximately a month ago for same complaint states she has not had resolution of symptoms.  She states pain is intermittent and most notable when she externally rotates her shoulder reaching towards her lower back.  She was treating with 1 dose of ibuprofen daily and Flexeril at bedtime which provides mild relief.  She denies any fever, chills, rash, wound or decreased sensation.  No trauma or injury reported.  No other concerns voiced.    Shoulder Pain   This is a new problem. The current episode started more than 1 month ago. There has been no history of extremity trauma. The problem occurs constantly. The problem has been unchanged. The quality of the pain is described as burning. The pain is at a severity of 6/10. The pain is moderate. Pertinent negatives include no fever, joint locking, joint swelling, limited range of motion, numbness, stiffness or tingling. The symptoms are aggravated by activity. She has tried OTC pain meds and NSAIDS for the symptoms. The treatment provided mild relief. Family history includes arthritis.       Constitution: Negative for chills and fever.   Musculoskeletal:  Positive for joint pain. Negative for trauma, joint swelling and abnormal ROM of joint.   Skin:  Negative for rash and wound.   Neurological:  Negative for numbness and tingling.      Objective:     Physical Exam   Constitutional: She is oriented to person, place, and time. She appears well-developed. She is cooperative.  Non-toxic appearance. She " does not appear ill. No distress.   HENT:   Head: Normocephalic and atraumatic.   Ears:   Right Ear: Hearing and external ear normal.   Left Ear: Hearing and external ear normal.   Nose: Nose normal. No mucosal edema, rhinorrhea or nasal deformity. No epistaxis. Right sinus exhibits no maxillary sinus tenderness and no frontal sinus tenderness. Left sinus exhibits no maxillary sinus tenderness and no frontal sinus tenderness.   Mouth/Throat: Uvula is midline, oropharynx is clear and moist and mucous membranes are normal. No trismus in the jaw. Normal dentition. No uvula swelling. No oropharyngeal exudate, posterior oropharyngeal edema or posterior oropharyngeal erythema.   Eyes: Conjunctivae and lids are normal. No scleral icterus.   Neck: Trachea normal and phonation normal. Neck supple. No edema present. No erythema present. No neck rigidity present.   Cardiovascular: Normal rate, regular rhythm, normal heart sounds and normal pulses.   Pulmonary/Chest: Effort normal and breath sounds normal. No respiratory distress. She has no decreased breath sounds. She has no rhonchi.   Abdominal: Normal appearance.   Musculoskeletal:         General: No deformity.      Comments: Right shoulder has pain with external rotation and elevation above 90°.  No rash, swelling, ecchymosis or erythema.  No joint crepitus noted.  Distal sensation intact.  Cap refill brisk.  Radial pulses 2+.   equal bilaterally.   Neurological: She is alert and oriented to person, place, and time. She exhibits normal muscle tone. Coordination normal.   Skin: Skin is warm, dry, intact, not diaphoretic and not pale.   Psychiatric: Her speech is normal and behavior is normal. Judgment and thought content normal.   Nursing note and vitals reviewed.      Assessment:     1. Acute pain of right shoulder    2. Right shoulder tendonitis        Plan:       Acute pain of right shoulder  -     XR SHOULDER COMPLETE 2 OR MORE VIEWS RIGHT; Future; Expected date:  01/09/2024  -     Ambulatory referral/consult to Orthopedics    Right shoulder tendonitis  -     Ambulatory referral/consult to Orthopedics    Other orders  -     ibuprofen (ADVIL,MOTRIN) 600 MG tablet; Take 1 tablet (600 mg total) by mouth 3 (three) times daily. for 7 days  Dispense: 21 tablet; Refill: 0          Medical Decision Making:   Initial Assessment:   Nontoxic appearing 54 yo female c/o right shoulder pain.  Patient presents with joint pain of the right shoulder . This was not the result of a traumatic injury. Based upon the history and physical examination my clinical impression is tendonitis of the right shoulder I have low suspicion for fracture, dislocation, significant ligamentous injury, septic arthritis, gout flare, new autoimmune arthropathy, or gonococcal arthropathy. X-ray is negative in clinic. Recommend supportive care. RICE. . Patient to treat pain with Tylenol/ibuprofen. Patient to follow up with PCP/ORTHO as needed. Provided with return instructions and ER precautions.    Independently Interpreted Test(s):   I have ordered and independently interpreted X-rays - see summary below.       <> Summary of X-Ray Reading(s): No acute fracture or dislocation noted        XR SHOULDER COMPLETE 2 OR MORE VIEWS RIGHT    Result Date: 1/9/2024  EXAMINATION: XR SHOULDER COMPLETE 2 OR MORE VIEWS RIGHT CLINICAL HISTORY: Pain in right shoulder TECHNIQUE: Two or three views of the right shoulder were performed. COMPARISON: None FINDINGS: No acute osseous abnormality.  Rotator cuff calcific tendinosis findings noted.  AC joint intact.  Lung parenchyma clear.     As above Electronically signed by: Jimi Melton MD Date:    01/09/2024 Time:    13:52   Patient Instructions   You have a tendonitis of the right shoulder.    To treat your pain:  600mg ibuprofen every 6 hours or tylenol 650mg every 6 hours as needed for pain. If needed, you can alternate these medications so that you take one medication every 3  hours. For instance, at noon take ibuprofen, then at 3pm take tylenol, then at 6pm take ibuprofen.       Rest area as needed to reduce pain  As pain recedes, begin normal activities slowly as tolerated.      Please arrange follow up with your primary medical clinic as soon as possible. You must understand that you've received an Urgent Care treatment only and that you may be released before all of your medical problems are known or treated. You, the patient, will arrange for follow up as instructed. If your symptoms worsen or fail to improve you should go to the Emergency Room.    WE CANNOT RULE OUT ALL POSSIBLE CAUSES OF YOUR SYMPTOMS IN THE URGENT CARE SETTING PLEASE GO TO THE ER IF YOU FEELS YOUR CONDITION IS WORSENING OR YOU WOULD LIKE EMERGENT EVALUATION.       A referral was placed to ortho today. If you have not received a call in 3 days, call the patient  at [phone] to inquire about your appointment.

## 2024-02-07 ENCOUNTER — OFFICE VISIT (OUTPATIENT)
Dept: SPORTS MEDICINE | Facility: CLINIC | Age: 54
End: 2024-02-07
Payer: COMMERCIAL

## 2024-02-07 DIAGNOSIS — M67.911 TENDINOPATHY OF RIGHT ROTATOR CUFF: ICD-10-CM

## 2024-02-07 DIAGNOSIS — M25.511 CHRONIC RIGHT SHOULDER PAIN: Primary | ICD-10-CM

## 2024-02-07 DIAGNOSIS — G89.29 CHRONIC RIGHT SHOULDER PAIN: Primary | ICD-10-CM

## 2024-02-07 DIAGNOSIS — M75.40 SUBACROMIAL IMPINGEMENT, UNSPECIFIED LATERALITY: ICD-10-CM

## 2024-02-07 DIAGNOSIS — M75.31 CALCIFIC TENDINITIS OF RIGHT SHOULDER: ICD-10-CM

## 2024-02-07 PROCEDURE — 1160F RVW MEDS BY RX/DR IN RCRD: CPT | Mod: CPTII,S$GLB,, | Performed by: STUDENT IN AN ORGANIZED HEALTH CARE EDUCATION/TRAINING PROGRAM

## 2024-02-07 PROCEDURE — 99204 OFFICE O/P NEW MOD 45 MIN: CPT | Mod: S$GLB,,, | Performed by: STUDENT IN AN ORGANIZED HEALTH CARE EDUCATION/TRAINING PROGRAM

## 2024-02-07 PROCEDURE — 99999 PR PBB SHADOW E&M-EST. PATIENT-LVL III: CPT | Mod: PBBFAC,,, | Performed by: STUDENT IN AN ORGANIZED HEALTH CARE EDUCATION/TRAINING PROGRAM

## 2024-02-07 PROCEDURE — 1159F MED LIST DOCD IN RCRD: CPT | Mod: CPTII,S$GLB,, | Performed by: STUDENT IN AN ORGANIZED HEALTH CARE EDUCATION/TRAINING PROGRAM

## 2024-02-07 RX ORDER — VORTIOXETINE 10 MG/1
1 TABLET, FILM COATED ORAL
COMMUNITY
Start: 2024-01-13

## 2024-02-07 RX ORDER — DICLOFENAC SODIUM 75 MG/1
75 TABLET, DELAYED RELEASE ORAL 2 TIMES DAILY WITH MEALS
Qty: 60 TABLET | Refills: 0 | Status: SHIPPED | OUTPATIENT
Start: 2024-02-07 | End: 2024-02-21 | Stop reason: ALTCHOICE

## 2024-02-07 RX ORDER — FENOFIBRATE 48 MG/1
TABLET, FILM COATED ORAL
COMMUNITY

## 2024-02-07 RX ORDER — DICLOFENAC SODIUM 75 MG/1
75 TABLET, DELAYED RELEASE ORAL 2 TIMES DAILY WITH MEALS
Qty: 60 TABLET | Refills: 0 | Status: SHIPPED | OUTPATIENT
Start: 2024-02-07 | End: 2024-02-07

## 2024-02-07 RX ORDER — ATORVASTATIN CALCIUM 40 MG/1
40 TABLET, FILM COATED ORAL
COMMUNITY
Start: 2023-11-10

## 2024-02-07 NOTE — PATIENT INSTRUCTIONS
Assessment:  Yue Serrato is a 53 y.o. female with a chief complaint of Pain of the Right Shoulder    Encounter Diagnoses   Name Primary?    Chronic right shoulder pain Yes    Calcific tendinitis of right shoulder     Subacromial impingement, unspecified laterality     Tendinopathy of right rotator cuff       Plan:  XR reviewed - calcific deposit in the distal rotator cuff, likely supraspinatus, with otherwise normal appearing radiographs of the right shoulder   Labs reviewed - Cr 0.8, GFR > 60, LFTs WNL  Patient reports A1c 10%, being managed by outside primary care physician, currently taking Trulicity and Ozempic  History and clinical exam is consistent with subacute right shoulder pain likely due to likely due to underlying calcific tendinopathy of the rotator cuff and resultant shoulder impingement.  No signs to suggest a larger high-grade rotator cuff injury or tear.  Diagnosis, treatment options, prognosis discussed in detail.    Corticosteroid injection is not a good option at this time given uncontrolled diabetes.  But if we can get glucose under better control, with an A1c of 8% or less, could consider 1st steroid injection.    Discussed the importance of therapy, patient reports that she difficult afford physical therapy at this time, but could consider it.    Discussed further treatment options, namely minimally invasive tenotomy with TenJet procedure, for which patient is interested, and we will plan to get prior authorization to proceed with this at our [place] location.  Also discussed possible surgical evaluation for calcific excision and repair.    Follow-up:  1 month for right shoulder TenJet or sooner if there are any problems between now and then.    Thank you for choosing [facility] and Dr. Nilo Brito for your orthopedic & sports medicine care. It is our goal to provide you with exceptional care that will help keep you healthy, active, and get you back in the  game.    Please do not hesitate to reach out to us via email, phone, or [facility] with any questions, concerns, or feedback.    If you are experiencing pain/discomfort ,or have questions after 5pm and would like to be connected to the [facility] on-call team, please call this number and specify which Sports Medicine provider is treating you: [phone]       [email or web address]    Here is some general information on TenJet procedure. You may also watch the video on this link to see an overview of how the procedure goes as well.   TenJet Video                   TENJET POST-PROCEDURE INSTRUCTIONS    1. WOUND CARE    - Keep bandages and procedure area clean and dry for 5 days    - Avoid submerging area in water for 5 days    - You did not have any sutures or stitches placed. Steri-strips were placed over the wound. These will fall off in the shower after about one week. If they have not fallen off after the first 9 days, you can remove them yourself.    - CONTACT OUR OFFICE IF:    - The area become red or hot to touch    - You have a fever of 100° or more (but do not wait for a response, seek immediate care)    - You have increased pain or swelling    - You have drainage from the treatment site    Best way to connect would be via [facility] or call [facility] at [phone]. If unable to connect, please proceed to the nearest Emergency Care Center.    2. POST-PROCEDURE PAIN CONTROL    - You may apply ice for 20 minutes as needed for discomfort    - Pain control: Tramadol as needed.    3. WEEK BY WEEK SCHEDULE    Weeks 1-2    Immobilization: Sling for comfort    Lifting restriction: 5 lbs    Activity/work limitations: No overhead lifting, overuse/repetitive activity    Week 3-5    Therapy: Start at the beginning of week 3    Immobilization: none    Return to work/activity: Per guidance of Physical therapist    Week 6-8    Follow-up in the office with   Daryl    Week 7+    Progress with your training or return to work. Typically, full results are noted at 3 months and beyond.    Thank you for choosing the [facility] for your care.

## 2024-02-07 NOTE — PROGRESS NOTES
Patient ID: Yue Serrato  YOB: 1970  MRN: [id number]    Chief Complaint: Pain of the Right Shoulder    Referred By: Self    Occupation:       History of Present Illness: Yue Serrato is a right-hand dominant 53 y.o. female with type II DM who presents today with Pain of the Right Shoulder    She complains of right shoulder pain that began around 2023 when she was moving a dresser in her bedroom.  She had shoulder pain but didn't think much of it.  Her pain worsened with time.  She has pain with reaching behind her back, into the back of her car and occasionally with overhead reaching.  The pain is located in her lateral shoulder and radiates down her upper arm.  No formal treatment to date.  No radicular pain or mechanical symptoms.  No neck pain.  She is diabetic with a recent A1C of 10.    Past Medical History:   Past Medical History:   Diagnosis Date    Anxiety     Depression     Diabetes mellitus type II     Diverticulitis     History of abnormal cervical Pap smear     HSV infection     Hyperlipidemia     Hypertension      Past Surgical History:   Procedure Laterality Date     SECTION      x3    COLONOSCOPY  2019    normal    ESOPHAGOGASTRODUODENOSCOPY N/A 2021    Procedure: EGD (ESOPHAGOGASTRODUODENOSCOPY);  Surgeon: Prerna Poole MD;  Location: [facility];  Service: Endoscopy;  Laterality: N/A;    ESOPHAGOGASTRODUODENOSCOPY N/A 3/20/2023    Procedure: EGD (ESOPHAGOGASTRODUODENOSCOPY);  Surgeon: Hector Reyes MD;  Location: [facility];  Service: Endoscopy;  Laterality: N/A;    LIPOMA RESECTION      TOTAL ABDOMINAL HYSTERECTOMY W/ BILATERAL SALPINGOOPHORECTOMY Bilateral 2005    dyplasia     Family History   Problem Relation Age of Onset    Heart disease Mother         CAD    Diabetes Mother     Hypertension Mother     Hyperlipidemia Mother     COPD Mother     COPD Father     Diabetes Father     Hyperlipidemia Father      Hypertension Father     Heart disease Father         MI    Colon cancer Neg Hx      Social History     Socioeconomic History    Marital status:     Number of children: 3   Occupational History     Employer: [employer]    Tobacco Use    Smoking status: Never     Passive exposure: Never    Smokeless tobacco: Never   Substance and Sexual Activity    Alcohol use: Yes     Comment: occasionally    Drug use: No    Sexual activity: Yes     Partners: Male     Birth control/protection: See Surgical Hx     Comment: NIGEL/BSO     Medication List with Changes/Refills   New Medications    DICLOFENAC (VOLTAREN) 75 MG EC TABLET    Take 1 tablet (75 mg total) by mouth 2 (two) times daily with meals.   Current Medications    ACETAMINOPHEN (TYLENOL) 500 MG TABLET    Tylenol Take @0700 No date recorded No form recorded No frequency recorded No route recorded No set duration recorded No set duration amount recorded suspended No dosage strength recorded No dosage strength units of measure recorded    ALBUTEROL (PROVENTIL/VENTOLIN HFA) 90 MCG/ACTUATION INHALER    albuterol sulfate Inhale 2 puff(s) (inhalation) every 4 hours for 7 days [id number] HFA aerosol inhaler every 4 hours inhalation 7 days suspended 90 mcg/actuation    ALPRAZOLAM (XANAX) 0.25 MG TABLET    Take 0.25 mg by mouth.    ATORVASTATIN (LIPITOR) 10 MG TABLET    Take 10 mg by mouth once daily.    ATORVASTATIN (LIPITOR) 40 MG TABLET    Take 40 mg by mouth.    BLOOD SUGAR DIAGNOSTIC (ACCU-CHEK SMARTVIEW TEST STRIP) STRP    Check BS fastin and 2 hrs after biggest meal    CYCLOBENZAPRINE (FLEXERIL) 10 MG TABLET    Take 10 mg by mouth 3 (three) times daily.    ERLOTINIB HCL (ERLOTINIB ORAL)    erlotinib Take No date recorded No form recorded No frequency recorded No route recorded No set duration recorded No set duration amount recorded active No dosage strength recorded No dosage strength units of measure recorded    ESTRADIOL (VAGIFEM) 10 MCG TAB    Place 1 tablet (10  "mcg total) vaginally twice a week.    FENOFIBRATE (TRICOR) 48 MG TABLET    1 tablet Orally Once a day for 30 day(s)    FENOFIBRATE 160 MG TAB    Take 160 mg by mouth once daily.    FLUOXETINE 20 MG CAPSULE    Take 60 mg by mouth.    FLUTICASONE PROPIONATE (FLONASE) 50 MCG/ACTUATION NASAL SPRAY    by Each Nostril route.    GABAPENTIN (NEURONTIN) 300 MG CAPSULE    Take 300 mg by mouth 2 (two) times daily.    LACTOBACILLUS ACIDOPHILUS (PROBIOTIC) 10 BILLION CELL CAP    Take 1 capsule by mouth once daily.    LANCETS (ACCU-CHEK SOFTCLIX LANCETS) MISC    Check BS fastin and 2 hrs after biggest meal    NYSTATIN (MYCOSTATIN) CREAM    Apply topically 2 (two) times daily. for 7 days    ONETOUCH DELICA PLUS LANCET 33 GAUGE MISC    Apply topically.    ONETOUCH VERIO FLEX METER MISC    use as directed    OZEMPIC 1 MG/DOSE (4 MG/3 ML)    Inject 2 mg into the skin every 7 days.    PANTOPRAZOLE (PROTONIX) 40 MG TABLET    Take 1 tablet (40 mg total) by mouth once daily.    PROMETHAZINE (PHENERGAN) 25 MG TABLET    Take 25 mg by mouth every 6 (six) hours as needed.    TRESIBA FLEXTOUCH U-200 200 UNIT/ML (3 ML) INPN    INJECT 100 UNITS SUBCUTANEOUSLY ONCE DAILY    TRINTELLIX 10 MG TAB    Take 1 tablet by mouth.    TRULICITY 1.5 MG/0.5 ML PNIJ    INJECT 1 & 1 2 (ONE & ONE HALF) MG SUBCUTANEOUSLY ONCE A WEEK    VALACYCLOVIR (VALTREX) 1000 MG TABLET    Take 1,000 mg by mouth 3 (three) times daily.   Discontinued Medications    NAPROXEN (NAPROSYN) 500 MG TABLET    Take 1 tablet (500 mg total) by mouth 2 (two) times daily with meals.     Review of patient's allergies indicates:   Allergen Reactions    Morphine Other (See Comments)     Reports "headache"  Other reaction(s): HEADACHE       Physical Exam:   There is no height or weight on file to calculate BMI.    Physical Exam  Detailed MSK exam:     Right Shoulder:  Inspection: No swelling, erythema or ecchymosis.   Palpation: Tenderness to palpation Lateral Subacromial space and AC " joint  Range of motion: 170 deg Flexion         65 deg External Rotation in Adduction         IR to Lumbar  Strength:  5-/5 Abduction    5-/5 External Rotation in Adduction    5/5 Internal Rotation   Special Tests: Positive Coronado-Grady    Positive Neer's    Negative Speed's    Equivocal Hyampom's  Negative Empty Can  Negative Spurling's   N/V Exam:  Radial: Normal motor (EPL/thumbs up)              Normal sensory (dorsal hand)   Median: Normal motor (FPL/A-OK)      Normal sensory (thumb)   Ulnar:  Normal motor (Interossei/scissors-spread)     Normal sensory (5th finger)   LABC: Normal sensory (lateral forearm)   MABC: Normal sensory (medial forearm)   MC: Normal motor (elbow flexion)   Axillary: Normal motor/sensory (deltoid)  Normal radial and ulnar pulses, warm and well perfused with capillary refill < 2 sec       Imaging:  XR SHOULDER COMPLETE 2 OR MORE VIEWS RIGHT  Narrative: EXAMINATION:  XR SHOULDER COMPLETE 2 OR MORE VIEWS RIGHT    CLINICAL HISTORY:  Pain in right shoulder    TECHNIQUE:  Two or three views of the right shoulder were performed.    COMPARISON:  None    FINDINGS:  No acute osseous abnormality.  Rotator cuff calcific tendinosis findings noted.  AC joint intact.  Lung parenchyma clear.  Impression: As above    Electronically signed by: Jimi Melton MD  Date:    01/09/2024  Time:    13:52    Relevant imaging results were reviewed and interpreted by me and per my read:  Calcific density in the distal rotator cuff, likely supraspinatus.  Otherwise normal appearing radiographs of the right shoulder.  No significant degenerative changes.  Normal alignment.  No fractures or other acute abnormalities.    This was discussed with the patient and / or family today.     Patient Instructions   Assessment:  Yue Serrato is a 53 y.o. female with a chief complaint of Pain of the Right Shoulder    Encounter Diagnoses   Name Primary?    Chronic right shoulder pain Yes    Calcific tendinitis of right  shoulder     Subacromial impingement, unspecified laterality     Tendinopathy of right rotator cuff       Plan:  XR reviewed - calcific deposit in the distal rotator cuff, likely supraspinatus, with otherwise normal appearing radiographs of the right shoulder   Labs reviewed - Cr 0.8, GFR > 60, LFTs WNL  Patient reports A1c 10%, being managed by outside primary care physician, currently taking Trulicity and Ozempic  History and clinical exam is consistent with subacute right shoulder pain likely due to likely due to underlying calcific tendinopathy of the rotator cuff and resultant shoulder impingement.  No signs to suggest a larger high-grade rotator cuff injury or tear.  Diagnosis, treatment options, prognosis discussed in detail.    Corticosteroid injection is not a good option at this time given uncontrolled diabetes.  But if we can get glucose under better control, with an A1c of 8% or less, could consider 1st steroid injection.    Discussed the importance of therapy, patient reports that she difficult afford physical therapy at this time, but could consider it.    Discussed further treatment options, namely minimally invasive tenotomy with TenJet procedure, for which patient is interested, and we will plan to get prior authorization to proceed with this at our [place] location.  Also discussed possible surgical evaluation for calcific excision and repair.    Follow-up:  1 month for right shoulder TenJet or sooner if there are any problems between now and then.    Thank you for choosing [facility] and Dr. Nilo Brito for your orthopedic & sports medicine care. It is our goal to provide you with exceptional care that will help keep you healthy, active, and get you back in the game.    Please do not hesitate to reach out to us via email, phone, or MyChart with any questions, concerns, or feedback.    If you are experiencing pain/discomfort ,or have questions after 5pm and would like to be  connected to the [facility] on-call team, please call this number and specify which Sports Medicine provider is treating you: [phone]       A copy of today's visit note has been sent to the referring provider.           Nilo Brito MD  Primary Care Sports Medicine    Disclaimer: This note was prepared using a voice recognition system and is likely to have sound alike errors within the text.

## 2024-02-19 ENCOUNTER — TELEPHONE (OUTPATIENT)
Dept: SPORTS MEDICINE | Facility: CLINIC | Age: 54
End: 2024-02-19
Payer: COMMERCIAL

## 2024-02-19 NOTE — TELEPHONE ENCOUNTER
Called pt back. Tenjet was not scheduled; therefore, auth had not been started. Assisted with scheduling procedure. Notified pt of the pre-procedure instructions listed in her after visit summary on mychart.  Pt stated she has severe anxiety and hear about a medication called Versed for pre-procedure anxiety and wanted to know if that was an option or if Dr. Brito could prescribe anything.   Pt also stated her diclofenac, ibuprofen, and tylenol were not helping her pain. Wanted to know if any other NSAIDs could be prescribed.  Notified pt I would inquire on her behalf if anything could be sent in.

## 2024-02-19 NOTE — TELEPHONE ENCOUNTER
----- Message from Elder Wheat sent at 2/19/2024 12:22 PM CST -----  Contact: Yue  Pt is calling in regards to getting a call back to discuss concerns with shoulder pain and other options for medication.  Pt would also like to discuss if the surgery has been approved or denied by her insurance.  Please call back at .[phone]       Thanks

## 2024-02-21 DIAGNOSIS — M75.40 SUBACROMIAL IMPINGEMENT, UNSPECIFIED LATERALITY: ICD-10-CM

## 2024-02-21 DIAGNOSIS — G89.29 CHRONIC RIGHT SHOULDER PAIN: Primary | ICD-10-CM

## 2024-02-21 DIAGNOSIS — M25.511 CHRONIC RIGHT SHOULDER PAIN: Primary | ICD-10-CM

## 2024-02-21 DIAGNOSIS — M67.911 TENDINOPATHY OF RIGHT ROTATOR CUFF: ICD-10-CM

## 2024-02-21 DIAGNOSIS — M75.31 CALCIFIC TENDINITIS OF RIGHT SHOULDER: ICD-10-CM

## 2024-02-21 RX ORDER — NABUMETONE 750 MG/1
750 TABLET, FILM COATED ORAL 2 TIMES DAILY
Qty: 60 TABLET | Refills: 1 | Status: ON HOLD | OUTPATIENT
Start: 2024-02-21 | End: 2024-04-17 | Stop reason: HOSPADM

## 2024-02-22 DIAGNOSIS — F41.9 ANXIETY DUE TO INVASIVE PROCEDURE: Primary | ICD-10-CM

## 2024-02-22 RX ORDER — DIAZEPAM 5 MG/1
TABLET ORAL
Qty: 1 TABLET | Refills: 0 | Status: SHIPPED | OUTPATIENT
Start: 2024-02-22

## 2024-02-23 ENCOUNTER — OFFICE VISIT (OUTPATIENT)
Dept: UROLOGY | Facility: CLINIC | Age: 54
End: 2024-02-23
Payer: COMMERCIAL

## 2024-02-23 VITALS — BODY MASS INDEX: 30 KG/M2 | WEIGHT: 163 LBS | HEIGHT: 62 IN

## 2024-02-23 DIAGNOSIS — R31.21 ASYMPTOMATIC MICROSCOPIC HEMATURIA: Primary | ICD-10-CM

## 2024-02-23 PROCEDURE — 87088 URINE BACTERIA CULTURE: CPT | Performed by: UROLOGY

## 2024-02-23 PROCEDURE — 87147 CULTURE TYPE IMMUNOLOGIC: CPT | Performed by: UROLOGY

## 2024-02-23 PROCEDURE — 1159F MED LIST DOCD IN RCRD: CPT | Mod: CPTII,S$GLB,, | Performed by: UROLOGY

## 2024-02-23 PROCEDURE — 99204 OFFICE O/P NEW MOD 45 MIN: CPT | Mod: S$GLB,,, | Performed by: UROLOGY

## 2024-02-23 PROCEDURE — 99999 PR PBB SHADOW E&M-EST. PATIENT-LVL IV: CPT | Mod: PBBFAC,,, | Performed by: UROLOGY

## 2024-02-23 PROCEDURE — 3008F BODY MASS INDEX DOCD: CPT | Mod: CPTII,S$GLB,, | Performed by: UROLOGY

## 2024-02-23 PROCEDURE — 87086 URINE CULTURE/COLONY COUNT: CPT | Performed by: UROLOGY

## 2024-02-23 PROCEDURE — 1160F RVW MEDS BY RX/DR IN RCRD: CPT | Mod: CPTII,S$GLB,, | Performed by: UROLOGY

## 2024-02-23 NOTE — PROGRESS NOTES
Chief Complaint:  Recurrent UTIs, microscopic hematuria    HPI:   Yue Serrato is a 53 y.o. female that presents today for evaluation of recurrent UTIs and microscopic hematuria.  Patient notes that she has had UTIs all my life but more recently over the last 2-3 months she has had issues with recurrent infections.  Notes that it takes towards the end of the antibiotics cycle for her symptoms to improve, and then her symptoms will improve for 5-7 days and then will return.  Patient notes dysuria, urgency, and suprapubic discomfort.  Patient notes history of poorly-controlled diabetes, A1c was 12 and then improved to 8.5.  She has not currently sexually active, notes that the last time she had intercourse, she had discomfort and bleeding.  She denies any gross hematuria.  Denies family history of  cancers.      PMH:  Past Medical History:   Diagnosis Date    Anxiety     Depression     Diabetes mellitus type II     Diverticulitis 2012    History of abnormal cervical Pap smear     HSV infection     Hyperlipidemia     Hypertension        PSH:  Past Surgical History:   Procedure Laterality Date     SECTION      x3    COLONOSCOPY  2019    normal    ESOPHAGOGASTRODUODENOSCOPY N/A 2021    Procedure: EGD (ESOPHAGOGASTRODUODENOSCOPY);  Surgeon: Prerna Poole MD;  Location: [facility];  Service: Endoscopy;  Laterality: N/A;    ESOPHAGOGASTRODUODENOSCOPY N/A 3/20/2023    Procedure: EGD (ESOPHAGOGASTRODUODENOSCOPY);  Surgeon: Hector Reyes MD;  Location: [facility];  Service: Endoscopy;  Laterality: N/A;    LIPOMA RESECTION      TOTAL ABDOMINAL HYSTERECTOMY W/ BILATERAL SALPINGOOPHORECTOMY Bilateral 2005    dyplasia       Family History:  Family History   Problem Relation Age of Onset    Heart disease Mother         CAD    Diabetes Mother     Hypertension Mother     Hyperlipidemia Mother     COPD Mother     COPD Father     Diabetes Father     Hyperlipidemia Father     Hypertension Father      Heart disease Father         MI    Colon cancer Neg Hx        Social History:  Social History     Tobacco Use    Smoking status: Never     Passive exposure: Never    Smokeless tobacco: Never   Substance Use Topics    Alcohol use: Yes     Comment: occasionally    Drug use: No        Review of Systems:  General: No fever, chills  Skin: No rashes  Chest:  Denies cough and sputum production  Heart: Denies chest pain  Resp: Denies dyspnea  Abdomen: Denies diarrhea, abdominal pain, hematemesis, or blood in stool.  Musculoskeletal: No joint stiffness or swelling. Denies back pain.  : see HPI  Neuro: no dizziness or weakness    Allergies:  Morphine    Medications:    Current Outpatient Medications:     acetaminophen (TYLENOL) 500 MG tablet, Tylenol Take @0700 No date recorded No form recorded No frequency recorded No route recorded No set duration recorded No set duration amount recorded suspended No dosage strength recorded No dosage strength units of measure recorded, Disp: , Rfl:     ALPRAZolam (XANAX) 0.25 MG tablet, Take 0.25 mg by mouth., Disp: , Rfl:     atorvastatin (LIPITOR) 10 MG tablet, Take 10 mg by mouth once daily., Disp: , Rfl:     atorvastatin (LIPITOR) 40 MG tablet, Take 40 mg by mouth., Disp: , Rfl:     blood sugar diagnostic (ACCU-CHEK SMARTVIEW TEST STRIP) Strp, Check BS fastin and 2 hrs after biggest meal, Disp: 60 strip, Rfl: 0    diazePAM (VALIUM) 5 MG tablet, Take one tab 30 mins prior to procedure, Disp: 1 tablet, Rfl: 0    erlotinib HCl (ERLOTINIB ORAL), erlotinib Take No date recorded No form recorded No frequency recorded No route recorded No set duration recorded No set duration amount recorded active No dosage strength recorded No dosage strength units of measure recorded, Disp: , Rfl:     fenofibrate (TRICOR) 48 MG tablet, 1 tablet Orally Once a day for 30 day(s), Disp: , Rfl:     fenofibrate 160 MG Tab, Take 160 mg by mouth once daily., Disp: , Rfl:     fluticasone propionate (FLONASE) 50  mcg/actuation nasal spray, by Each Nostril route., Disp: , Rfl:     lancets (ACCU-CHEK SOFTCLIX LANCETS) Misc, Check BS fastin and 2 hrs after biggest meal, Disp: 60 each, Rfl: 0    nabumetone (RELAFEN) 750 MG tablet, Take 1 tablet (750 mg total) by mouth 2 (two) times daily., Disp: 60 tablet, Rfl: 1    ONETOUCH DELICA PLUS LANCET 33 gauge Misc, Apply topically., Disp: , Rfl:     ONETOUCH VERIO FLEX METER Misc, use as directed, Disp: , Rfl:     OZEMPIC 1 mg/dose (4 mg/3 mL), Inject 2 mg into the skin every 7 days., Disp: , Rfl:     pantoprazole (PROTONIX) 40 MG tablet, Take 1 tablet (40 mg total) by mouth once daily., Disp: 30 tablet, Rfl: 3    TRESIBA FLEXTOUCH U-200 200 unit/mL (3 mL) InPn, INJECT 100 UNITS SUBCUTANEOUSLY ONCE DAILY, Disp: , Rfl: 5    TRINTELLIX 10 mg Tab, Take 1 tablet by mouth., Disp: , Rfl:     TRULICITY 1.5 mg/0.5 mL PnIj, INJECT 1 & 1 2 (ONE & ONE HALF) MG SUBCUTANEOUSLY ONCE A WEEK, Disp: , Rfl: 5    valACYclovir (VALTREX) 1000 MG tablet, Take 1,000 mg by mouth 3 (three) times daily., Disp: , Rfl:     estradioL (VAGIFEM) 10 mcg Tab, Place 1 tablet (10 mcg total) vaginally twice a week. (Patient not taking: Reported on 2/14/2023), Disp: 8 tablet, Rfl: 11    nystatin (MYCOSTATIN) cream, Apply topically 2 (two) times daily. for 7 days, Disp: 30 g, Rfl: 0    Physical Exam:  There were no vitals filed for this visit.  Body mass index is 29.81 kg/m².  General: awake, alert, cooperative  Head: NC/AT  Ears: external ears normal  Eyes: sclera normal  Lungs: normal inspiration, NAD  Heart: well-perfused  Skin: The skin is warm and dry  Ext: No c/c/e.  Neuro: grossly intact, AOx3    RADIOLOGY:  US RETROPERITONEAL KIDNEY AND BLADDER     Indication: [facility] Pacs merged reason for exam: R30.0:Dysuria R31.9:Hematuria, unspecified     Additional information:     Comparison: September 27, 2022 ultrasound abdomen complete     RENAL LENGTH   11.3 x 4.9 x 4.3 cm in length on  the right   11.4 x 5.1 x 4.9 cm in length on the left.     RENAL VOLUME   Right: 1/25/2021 ml   Left:   150.1 ml     Cortical echotexture is normal.   No solid mass, shadowing calculus, or hydronephrosis is seen.     Note:     In adults, the normal kidney is 10-14 cm long in males and 9-13 cm long in females, 3-5 cm wide.     Normal volume 110 to 190 ml in men and 90 to 150 ml in women. Renal volume can be estimated by multiply length times width times height divided by 2.     Imaging performed of the bladder. The bladder is incompletely distended at time of imaging with a prevoiding volume of 98.2 cc. No suspicious bladder mass identified on submitted images.     Note made of echogenic appearance of the liver suggesting hepatic steatosis.     IMPRESSION:     No acute or focal suspicious abnormality of the kidneys.   Note made of echogenic appearance of the liver compatible hepatic steatosis   The bladder is partially distended at the time of imaging with no obvious focal abnormality identified.     LABS:  I personally reviewed the following lab values:  Lab Results   Component Value Date    WBC 7.00 02/09/2023    HGB 14.0 02/09/2023    HCT 41.5 02/09/2023     02/09/2023     02/09/2023    K 4.3 02/09/2023     02/09/2023    CREATININE 0.8 02/09/2023    BUN 16 02/09/2023    CO2 20 (L) 02/09/2023    TSH 1.758 09/10/2013    INR 0.9 06/14/2018    HGBA1C 9.9 (H) 12/12/2013    CHOL 223 (H) 12/12/2013    TRIG 232 (H) 12/12/2013    HDL 48 12/12/2013    ALT 18 02/09/2023    AST 16 02/09/2023       URINALYSIS:  Urinalysis obtained in clinic today specific gravity 1.025 pH 5.5 trace intact blood small LE, negative for all other parameters    Assessment/Plan:   Yue Serrato is a 53 y.o. female with:    Microscopic hematuria - renal ultrasound negative, follow-up for office cystoscopy and pelvic exam    Recurrent UTIs - no culture data available for review, urine for culture today, pelvic floor dysfunction  also being considered    Jovi Jackson MD  Urology

## 2024-02-25 LAB — BACTERIA UR CULT: ABNORMAL

## 2024-03-07 ENCOUNTER — TELEPHONE (OUTPATIENT)
Dept: SPORTS MEDICINE | Facility: CLINIC | Age: 54
End: 2024-03-07
Payer: COMMERCIAL

## 2024-03-07 NOTE — TELEPHONE ENCOUNTER
----- Message from Renata Gil sent at 3/7/2024  8:43 AM CST -----  Contact: [phone]  Pt is calling regarding status of PA for procedure, insurance states nothing was received  . Please call back today at [phone] . Thanksdj

## 2024-03-07 NOTE — TELEPHONE ENCOUNTER
Called pt. Notified her that her procedure was approved by her insurance, they did not receive anything for a PA because the CPT code did not require one.    Pt had further questions on her tenotomy procedure. Reviewed the handout regarding the procedure that was provided at her last visit. Pt was worried about pain. Explained that typically a prescription is sent in for post-procedure discomfort/pain and that the procedure should improve her symptoms.    Advised that, if she thinks of any other questions, she can send us a message through [employer] and we can reply through there if preferred.

## 2024-03-14 ENCOUNTER — PROCEDURE VISIT (OUTPATIENT)
Dept: SPORTS MEDICINE | Facility: CLINIC | Age: 54
End: 2024-03-14
Payer: COMMERCIAL

## 2024-03-14 DIAGNOSIS — M67.911 TENDINOPATHY OF RIGHT ROTATOR CUFF: ICD-10-CM

## 2024-03-14 DIAGNOSIS — M25.511 CHRONIC RIGHT SHOULDER PAIN: ICD-10-CM

## 2024-03-14 DIAGNOSIS — G89.18 POST-OP PAIN: ICD-10-CM

## 2024-03-14 DIAGNOSIS — G89.29 CHRONIC RIGHT SHOULDER PAIN: ICD-10-CM

## 2024-03-14 DIAGNOSIS — M75.31 CALCIFIC TENDINITIS OF RIGHT SHOULDER: Primary | ICD-10-CM

## 2024-03-14 PROCEDURE — 23406 INCISE TENDON(S) & MUSCLE(S): CPT | Mod: RT,S$GLB,, | Performed by: STUDENT IN AN ORGANIZED HEALTH CARE EDUCATION/TRAINING PROGRAM

## 2024-03-14 PROCEDURE — 99499 UNLISTED E&M SERVICE: CPT | Mod: S$GLB,,, | Performed by: STUDENT IN AN ORGANIZED HEALTH CARE EDUCATION/TRAINING PROGRAM

## 2024-03-14 RX ORDER — TRAMADOL HYDROCHLORIDE 50 MG/1
TABLET ORAL
Qty: 28 TABLET | Refills: 0 | Status: CANCELLED | OUTPATIENT
Start: 2024-03-14

## 2024-03-14 RX ORDER — HYDROCODONE BITARTRATE AND ACETAMINOPHEN 5; 325 MG/1; MG/1
1 TABLET ORAL
Qty: 28 TABLET | Refills: 0 | Status: SHIPPED | OUTPATIENT
Start: 2024-03-14

## 2024-03-14 NOTE — PATIENT INSTRUCTIONS
Assessment:  Yue Serarto is a 54 y.o. female with a chief complaint of No chief complaint on file.    Encounter Diagnoses   Name Primary?    Chronic right shoulder pain     Calcific tendinitis of right shoulder Yes    Tendinopathy of right rotator cuff     Post-op pain       Plan:  Right shoulder TenJet procedure today for calcific tendinopathy and chronic right shoulder pain.  Postoperative instructions below.  Placed in a sling postprocedure today.  Wear for the next 2 weeks, more as a reminder about the procedure, and to limit any overhead reaching, lifting.  Remove for comfort, and for daily home exercise program.  At least 8 minutes were spent sizing, fitting, and educating regarding durable medical equipment today by clinical assistant under direction of Nilo Brito MD.   Home exercise program given today.  See below  At least 15 minutes were spent developing, teaching, and performing a home exercise program under the direction of Nilo Brito MD.  A written summary was provided and all questions were answered.  Referral for PT, discharge 3 weeks after procedure part    Follow-up: 6 weeks or sooner if there are any problems between now and then.    Thank you for choosing [facility] and Dr. Nilo Brito for your orthopedic & sports medicine care. It is our goal to provide you with exceptional care that will help keep you healthy, active, and get you back in the game.    Please do not hesitate to reach out to us via email, phone, or MyChart with any questions, concerns, or feedback.    If you are experiencing pain/discomfort ,or have questions after 5pm and would like to be connected to the [facility] on-call team, please call this number and specify which Sports Medicine provider is treating you: [phone]     TENJET POST-PROCEDURE INSTRUCTIONS    1. WOUND CARE    - Keep bandages and procedure area clean and dry for 5 days    -  Avoid submerging area in water for 5 days    - You did not have any sutures or stitches placed. Steri-strips were placed over the wound. These will fall off in the shower after about one week. If they have not fallen off after the first 9 days, you can remove them yourself.    - CONTACT OUR OFFICE IF:    - The area become red or hot to touch    - You have a fever of 100° or more (but do not wait for a response, seek immediate care)    - You have increased pain or swelling    - You have drainage from the treatment site    Best way to connect would be via [employer] or call [facility] at [phone]. If unable to connect, please proceed to the nearest Emergency Care Center.    2. POST-PROCEDURE PAIN CONTROL    - You may apply ice for 20 minutes as needed for discomfort    - Pain control:  Norco 5-325, every 6-8 hours as needed for pain and discomfort.    3. WEEK BY WEEK SCHEDULE    Weeks 1-2    Immobilization: Sling    Lifting restriction: 3-5 lbs (can of soup), no overhead reaching/lifting x2 weeks    Activity/work limitations: No overuse/repetitive activity    Week 3-5    Therapy: Start at the beginning of week 3    Immobilization: none    Return to work/activity: Per guidance of Physical therapist    Week 6-8    Follow-up in the office with Dr. Brito    Week 7+    Progress with your training or return to work. Typically, full results are noted at 3 months and beyond.    Thank you for choosing the [facility] for your care.

## 2024-03-14 NOTE — PROCEDURES
TENROSAT Operative Note:    PATIENT NAME: Yue Serrato    MEDICAL RECORD NUMBER: [id number]    AGE: 54 y.o.    INFORMED CONSENT: I verify that I personally obtained Yue Serrato's consent which was signed and uploaded into [employer].     TIMEOUT: Audible timeout was performed at 1:21 PM.   At this time, the team confirmed the correct patient, correct procedure and correct site with laterality. The patient's allergies were reviewed. The procedure site was marked. The procedure team checked for proper functioning of devices and supplies to be used for the procedure. The procedure team reviewed the relevant diagnostic tests pertinent to the procedure.    PHYSICIAN: Nilo Brito    LOCATION: [facility], [place].      PROCEDURE: TenJet Percutaneous Tenotomy procedure for chronic right shoulder pain, calcific tendinopathy    ANESTHESIA: local    PREOPERATIVE DIAGNOSIS:  Chronic right shoulder pain, calcific tendinopathy    POSTOPERATIVE DIAGNOSIS:  Chronic right shoulder pain, calcific tendinopathy    PROCEDURE DESCRIPTION:    The treatment area was cleaned and draped in sterile fashion. Using sterile technique, a [employer]-Turbo ultrasound laptop unit with a variable frequency (13.0-6.0 MHz) linear transducer was used to successfully localize the area of pathology to be treated today. A heather with a sterile marker was placed, on the skin, at the area of maximum tenderness. Then the treatment area was cleaned and draped in sterile fashion. A 22 gauge needle was visualized under ultrasound administering anesthetic lidocaine, to locally anesthetize the treatment area. A separate 25 gauge needle was then utilized to create a skin wheel using 1% lidocaine with epinephrine 1.5cm from the treatment area. An 11 gauge scalpel was used to make a small puncture incision in the area of the skin wheel, and ultrasound was utilized to visualize the scalpel at the target area. The 3 inch  tenotomy needle was inserted into the pathologic tissue under direct ultrasound guidance, and tenotomy was performed with degenerative tendinotic tissue removed from the distal supraspinatus tendon, and from the anterior fibers of the infraspinatus tendon. Upon completion, gentle compression was applied to the site with sterile gauze. Adhesive strips, occlusive dressing, and compression bandage were then applied.    Patient left the procedure room in satisfactory condition having tolerated the procedure well.    CUTTING TIME:  1:24 p.m.    TOTAL VOLUME:  2 L    ESTIMATED BLOOD LOSS: <5 ml    COMPLICATIONS: none    POSTOPERATIVE PLAN:   As indicated in the AVS given to the patient today post procedure.      Patient voiced understanding of these instructions.        Nilo Brito [facility]  [facility]  03/14/2024  2:01 PM

## 2024-03-19 ENCOUNTER — TELEPHONE (OUTPATIENT)
Dept: UROLOGY | Facility: CLINIC | Age: 54
End: 2024-03-19
Payer: COMMERCIAL

## 2024-03-19 NOTE — TELEPHONE ENCOUNTER
Appt was rescheduled pt notified on portal       ----- Message from Jenae Sepulveda sent at 3/18/2024  2:03 PM CDT -----  Contact: Yue Turner is calling to speak to the nurse regarding her scheduled procedure on tomorrow 03/19, the patient stated she may have a stomach virus and need to reschedule,  please give her a call at  [phone]    Thanks  LJ

## 2024-03-25 ENCOUNTER — CLINICAL SUPPORT (OUTPATIENT)
Dept: REHABILITATION | Facility: HOSPITAL | Age: 54
End: 2024-03-25
Attending: STUDENT IN AN ORGANIZED HEALTH CARE EDUCATION/TRAINING PROGRAM
Payer: COMMERCIAL

## 2024-03-25 DIAGNOSIS — M25.511 CHRONIC RIGHT SHOULDER PAIN: ICD-10-CM

## 2024-03-25 DIAGNOSIS — Z74.09 DECREASED FUNCTIONAL MOBILITY AND ENDURANCE: ICD-10-CM

## 2024-03-25 DIAGNOSIS — M25.611 DECREASED RANGE OF MOTION OF RIGHT SHOULDER: Primary | ICD-10-CM

## 2024-03-25 DIAGNOSIS — M75.31 CALCIFIC TENDINITIS OF RIGHT SHOULDER: ICD-10-CM

## 2024-03-25 DIAGNOSIS — M67.911 TENDINOPATHY OF RIGHT ROTATOR CUFF: ICD-10-CM

## 2024-03-25 DIAGNOSIS — G89.29 CHRONIC RIGHT SHOULDER PAIN: ICD-10-CM

## 2024-03-25 DIAGNOSIS — R29.898 DECREASED ROM OF NECK: ICD-10-CM

## 2024-03-25 PROCEDURE — 97161 PT EVAL LOW COMPLEX 20 MIN: CPT | Mod: PN

## 2024-03-25 PROCEDURE — 97112 NEUROMUSCULAR REEDUCATION: CPT | Mod: PN

## 2024-03-25 NOTE — PROGRESS NOTES
[facility]  Physical Therapy Initial Evaluation    Name: Yue Serrato  Clinic Number: [id number]    Therapy Diagnosis:   Encounter Diagnoses   Name Primary?    Chronic right shoulder pain     Calcific tendinitis of right shoulder     Tendinopathy of right rotator cuff     Decreased range of motion of right shoulder Yes    Decreased ROM of neck     Decreased functional mobility and endurance      Physician: Nilo Brito MD    Physician Orders: PT Eval and Treat   Medical Diagnosis from Referral:   M25.511,G89.29 (ICD-10-CM) - Chronic right shoulder pain   M75.31 (ICD-10-CM) - Calcific tendinitis of right shoulder   M67.911 (ICD-10-CM) - Tendinopathy of right rotator cuff     Evaluation Date: 3/25/2024  Authorization Period Expiration: 12/31/24  Plan of Care Expiration: 6/3/24  Visit # / Visits authorized: 1/ 1  Foto: 1/3    Precautions: Standard, DMII,   Weeks 1-2     Immobilization: Sling     Lifting restriction: 3-5 lbs (can of soup), no overhead reaching/lifting x2 weeks     Activity/work limitations: No overuse/repetitive activity     Week 3-5     Therapy: Start at the beginning of week 3     Immobilization: none     Return to work/activity: Per guidance of Physical therapist    Time In: 7:05 am  Time Out: 8:00 am  Total Time: 55 minutes      Subjective   Date of onset: on and off about 5 years  History of current condition - Yue reports: R shoulder pain with no known cause; tenojet to supra and infraspinatus 3/14/24. Pt arrived today without her sling as she forgot it and states she is only intermittently using it at home.     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 Rotator cuff calcific tendinosis findings noted. AC joint intact. Lung parenchyma clear.     Medical History:   Past Medical History:   Diagnosis Date    Anxiety     Depression     Diabetes mellitus type II     Diverticulitis     History of abnormal cervical Pap smear     HSV infection     Hyperlipidemia     Hypertension        Surgical History:   Yue Serrato  has a past surgical history that includes  section; Lipoma resection; Esophagogastroduodenoscopy (N/A, 2021); Colonoscopy (); Total abdominal hysterectomy w/ bilateral salpingoophorectomy (Bilateral, 2005); and Esophagogastroduodenoscopy (N/A, 3/20/2023).    Medications:   Yue has a current medication list which includes the following prescription(s): acetaminophen, alprazolam, atorvastatin, atorvastatin, blood sugar diagnostic, diazepam, erlotinib hcl, estradiol, fenofibrate, fenofibrate, fluticasone propionate, hydrocodone-acetaminophen, lancets, nabumetone, nystatin, onetouch delica plus lancet, onetouch verio flex meter, ozempic, pantoprazole, tresiba flextouch u-200, trintellix, trulicity, and valacyclovir.    Allergies:   Review of patient's allergies indicates:   Allergen Reactions    Morphine Other (See Comments)     Reports "headache"  Other reaction(s): HEADACHE        Pts goals: return to work and be able to move the arm well    Objective       CMS Impairment/Limitation/Restriction for FOTO shoulder Survey    Therapist reviewed FOTO scores for Yue Serrato on 3/25/2024.   FOTO documents entered into EPIC - see Media section.    Functional Score: 49         Posture: Pt noted to present with moderate forward head/rounded shoulder posture.  B scapula elevated and protracted.  Reduced scapulo/humeral rhythm B and " has a difficult time engaging on R>L.     Shoulder ROM:    Active/Passive Joint Range Right Left   Flexion 90 160   ABDuction 70 175   External Rotation 40 65 at 0 and 70 at 90 abduction   Internal Rotation 20 Combined T10; 55   Adduction 0 30   Extension 15 60     Pain with motions of internal rotation, flexion and abduction and abduction.    Cervical Spine AROM:   Degrees Pain   Flexion 45 y   Extend 45 y   R side bend 20 y   L side bend 35 y   R rotation 55 y   L rotation 50 y     Strength:  Muscle (Myotome) Right Left   Cervical Deep neck Flexor hold time 12 sec/32 is normal    Shoulder Abduction 2 4   Elbow Flexors (C5) 3+ 4   Wrist Extensors (C6) 5 5   Elbow Extensors (C7) 4 5   ER (arm at side) 2+ 5   IR (arm at side) 2+ 5   Thumb extensors 5 5   Intrinsic strength good good   Mid trap and lower traps to be assessed at later date    Sensation: Intact to light touch      Palpation:  tight upper trap, subscapularis, levator, bicep and trigger points in rotator cuff tissues and lat B.    TREATMENT   Treatment Time In: 7:40 am  Treatment Time Out: 8:05 am  Total Treatment time separate from Evaluation: 25 minutes    Yue participated in neuromuscular re-education activities to improve: Kinesthetic and Proprioception for 25 minutes. The following activities were included:    Supine chin tuck 2x 10  Supine chin tuck with retraction 3x 10  Scapular protraction in supine at 0 flexion 2x 10 then with PT assistance seated at 60 degrees flexion  Scapular protraction seated 3x 10 (very poor mechanics)  Shoulder shrugs 3x 10  Scapular depression seated and supine 3x 10      Home Exercises and Patient Education Provided    Education provided:   -Education on condition, HEP, and PT worked with pt on neck and scapular mobility to free up the shoulder and help ease pain.     Written Home Exercises Provided: Patient instructed to cont prior HEP.  Exercises were reviewed and Yue was able to demonstrate them prior to the  end of the session.  Yue demonstrated good  understanding of the education provided.     See EMR under Patient Instructions for exercises provided 3/25/2024.    Assessment   Yue is a 54 y.o. female referred to outpatient Physical Therapy with a medical diagnosis of   M25.511,G89.29 (ICD-10-CM) - Chronic right shoulder pain   M75.31 (ICD-10-CM) - Calcific tendinitis of right shoulder   M67.911 (ICD-10-CM) - Tendinopathy of right rotator cuff   , pt presents with signs and symptoms consistent with diagnosis along with neck ROM loss, shoulder ROM loss, shoulder pain, neck pain. The patient presents with impairments which include impairments list: ROM, strength, endurance, joint mobility, muscle length, balance, posture, gait mechanics, core strength and stability, and functional movement patterns.  These impairments are limiting patient's ability to reach, dress and perform lifting.     Pt prognosis is Good due to personal factors and co-morbidities listed below.   Pt will benefit from skilled outpatient Physical Therapy to address the deficits stated above and in the chart below, provide pt/family education, and to maximize pt's level of independence.     Plan of care discussed with patient: Yes  Pt's spiritual, cultural and educational needs considered and patient is agreeable to the plan of care and goals as stated below:     Anticipated Barriers for therapy: drives for work. Can only attend 1 time a week      Medical Necessity is demonstrated by the following  History  Co-morbidities and personal factors that may impact the plan of care [x] LOW: no personal factors / co-morbidities  [] MODERATE: 1-2 personal factors / co-morbidities  [] HIGH: 3+ personal factors / co-morbidities    Moderate / High Support Documentation:   Co-morbidities affecting plan of care: na    Personal Factors:   lifestyle     Examination  Body Structures and Functions, activity limitations and participation restrictions that may  impact the plan of care [x] LOW: addressing 1-2 elements  [] MODERATE: 3+ elements  [] HIGH: 4+ elements (please support below)    Moderate / High Support Documentation: na     Clinical Presentation [x] LOW: stable  [] MODERATE: Evolving  [] HIGH: Unstable     Decision Making/ Complexity Score: low         Goals:  Short Term Goals: In 4 weeks   1.Pt to be educated on HEP.  2.Patient to increase GH ROM to 150 degrees flexion or better    3.Patient to increase strength to 3+/5 with external rotation or better  4.Patient to have pain less than 5/10 or better at all times.  5.Patient to improve score on the FOTO by 10%.  6. Pt to be educated on postural and body mechanics awareness.    Long Term Goals: In 10 weeks  1. Patient to improve score on the FOTO to 66.  2. Patient to demo increase in UE strength to 4/5 with flexion and rotation  3. Patient to have decreased pain to 2/10 at all times.  4. Patient to demo increase GH ROM to 65 degrees or better with Internal rotation and reaching behind back to T9 or better  5. Patient to perform daily activities including dressing without help, lifting up to 5 # overhead and carrying up to 20 # or better in the R UE.      Plan   Plan of care Certification: 3/25/2024 to 6/3/24.    Outpatient Physical Therapy 2 times weekly for 10 weeks to include the following interventions: Cervical/Lumbar Traction, Electrical Stimulation IFC, NMES, Manual Therapy, Moist Heat/ Ice, Neuromuscular Re-ed, Patient Education, Self Care, Therapeutic Activities, Therapeutic Exercise, and DN .     Nithya Hummel, PT, CIDN, SFMA    Thank you for this referral.    These services are reasonable and necessary for the conditions set forth above while under my care.

## 2024-03-25 NOTE — PLAN OF CARE
[facility]  Physical Therapy Initial Evaluation    Name: Yue Serrato  Clinic Number: [id number]    Therapy Diagnosis:   Encounter Diagnoses   Name Primary?    Chronic right shoulder pain     Calcific tendinitis of right shoulder     Tendinopathy of right rotator cuff     Decreased range of motion of right shoulder Yes    Decreased ROM of neck     Decreased functional mobility and endurance      Physician: Nilo Brito MD    Physician Orders: PT Eval and Treat   Medical Diagnosis from Referral:   M25.511,G89.29 (ICD-10-CM) - Chronic right shoulder pain   M75.31 (ICD-10-CM) - Calcific tendinitis of right shoulder   M67.911 (ICD-10-CM) - Tendinopathy of right rotator cuff     Evaluation Date: 3/25/2024  Authorization Period Expiration: 12/31/24  Plan of Care Expiration: 6/3/24  Visit # / Visits authorized: 1/ 1  Foto: 1/3    Precautions: Standard, DMII,   Weeks 1-2     Immobilization: Sling     Lifting restriction: 3-5 lbs (can of soup), no overhead reaching/lifting x2 weeks     Activity/work limitations: No overuse/repetitive activity     Week 3-5     Therapy: Start at the beginning of week 3     Immobilization: none     Return to work/activity: Per guidance of Physical therapist    Time In: 7:05 am  Time Out: 8:00 am  Total Time: 55 minutes      Subjective   Date of onset: on and off about 5 years  History of current condition - Yue reports: R shoulder pain with no known cause; tenojet to supra and infraspinatus 3/14/24. Pt arrived today without her sling as she forgot it and states she is only intermittently using it at home.     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 Rotator cuff calcific tendinosis findings noted. AC joint intact. Lung parenchyma clear.     Medical History:   Past Medical History:   Diagnosis Date    Anxiety     Depression     Diabetes mellitus type II     Diverticulitis     History of abnormal cervical Pap smear     HSV infection     Hyperlipidemia     Hypertension        Surgical History:   Yue Serrato  has a past surgical history that includes  section; Lipoma resection; Esophagogastroduodenoscopy (N/A, 2021); Colonoscopy (); Total abdominal hysterectomy w/ bilateral salpingoophorectomy (Bilateral, 2005); and Esophagogastroduodenoscopy (N/A, 3/20/2023).    Medications:   Yue has a current medication list which includes the following prescription(s): acetaminophen, alprazolam, atorvastatin, atorvastatin, blood sugar diagnostic, diazepam, erlotinib hcl, estradiol, fenofibrate, fenofibrate, fluticasone propionate, hydrocodone-acetaminophen, lancets, nabumetone, nystatin, onetouch delica plus lancet, onetouch verio flex meter, ozempic, pantoprazole, tresiba flextouch u-200, trintellix, trulicity, and valacyclovir.    Allergies:   Review of patient's allergies indicates:   Allergen Reactions    Morphine Other (See Comments)     Reports "headache"  Other reaction(s): HEADACHE        Pts goals: return to work and be able to move the arm well    Objective       CMS Impairment/Limitation/Restriction for FOTO shoulder Survey    Therapist reviewed FOTO scores for Yue Serrato on 3/25/2024.   FOTO documents entered into EPIC - see Media section.    Functional Score: 49         Posture: Pt noted to present with moderate forward head/rounded shoulder posture.  B scapula elevated and protracted.  Reduced scapulo/humeral rhythm B and " has a difficult time engaging on R>L.     Shoulder ROM:    Active/Passive Joint Range Right Left   Flexion 90 160   ABDuction 70 175   External Rotation 40 65 at 0 and 70 at 90 abduction   Internal Rotation 20 Combined T10; 55   Adduction 0 30   Extension 15 60     Pain with motions of internal rotation, flexion and abduction and abduction.    Cervical Spine AROM:   Degrees Pain   Flexion 45 y   Extend 45 y   R side bend 20 y   L side bend 35 y   R rotation 55 y   L rotation 50 y     Strength:  Muscle (Myotome) Right Left   Cervical Deep neck Flexor hold time 12 sec/32 is normal    Shoulder Abduction 2 4   Elbow Flexors (C5) 3+ 4   Wrist Extensors (C6) 5 5   Elbow Extensors (C7) 4 5   ER (arm at side) 2+ 5   IR (arm at side) 2+ 5   Thumb extensors 5 5   Intrinsic strength good good   Mid trap and lower traps to be assessed at later date    Sensation: Intact to light touch      Palpation:  tight upper trap, subscapularis, levator, bicep and trigger points in rotator cuff tissues and lat B.    TREATMENT   Treatment Time In: 7:40 am  Treatment Time Out: 8:05 am  Total Treatment time separate from Evaluation: 25 minutes    Yue participated in neuromuscular re-education activities to improve: Kinesthetic and Proprioception for 25 minutes. The following activities were included:    Supine chin tuck 2x 10  Supine chin tuck with retraction 3x 10  Scapular protraction in supine at 0 flexion 2x 10 then with PT assistance seated at 60 degrees flexion  Scapular protraction seated 3x 10 (very poor mechanics)  Shoulder shrugs 3x 10  Scapular depression seated and supine 3x 10      Home Exercises and Patient Education Provided    Education provided:   -Education on condition, HEP, and PT worked with pt on neck and scapular mobility to free up the shoulder and help ease pain.     Written Home Exercises Provided: Patient instructed to cont prior HEP.  Exercises were reviewed and Yue was able to demonstrate them prior to the  end of the session.  Yue demonstrated good  understanding of the education provided.     See EMR under Patient Instructions for exercises provided 3/25/2024.    Assessment   Yue is a 54 y.o. female referred to outpatient Physical Therapy with a medical diagnosis of   M25.511,G89.29 (ICD-10-CM) - Chronic right shoulder pain   M75.31 (ICD-10-CM) - Calcific tendinitis of right shoulder   M67.911 (ICD-10-CM) - Tendinopathy of right rotator cuff   , pt presents with signs and symptoms consistent with diagnosis along with neck ROM loss, shoulder ROM loss, shoulder pain, neck pain. The patient presents with impairments which include impairments list: ROM, strength, endurance, joint mobility, muscle length, balance, posture, gait mechanics, core strength and stability, and functional movement patterns.  These impairments are limiting patient's ability to reach, dress and perform lifting.     Pt prognosis is Good due to personal factors and co-morbidities listed below.   Pt will benefit from skilled outpatient Physical Therapy to address the deficits stated above and in the chart below, provide pt/family education, and to maximize pt's level of independence.     Plan of care discussed with patient: Yes  Pt's spiritual, cultural and educational needs considered and patient is agreeable to the plan of care and goals as stated below:     Anticipated Barriers for therapy: drives for work. Can only attend 1 time a week      Medical Necessity is demonstrated by the following  History  Co-morbidities and personal factors that may impact the plan of care [x] LOW: no personal factors / co-morbidities  [] MODERATE: 1-2 personal factors / co-morbidities  [] HIGH: 3+ personal factors / co-morbidities    Moderate / High Support Documentation:   Co-morbidities affecting plan of care: na    Personal Factors:   lifestyle     Examination  Body Structures and Functions, activity limitations and participation restrictions that may  impact the plan of care [x] LOW: addressing 1-2 elements  [] MODERATE: 3+ elements  [] HIGH: 4+ elements (please support below)    Moderate / High Support Documentation: na     Clinical Presentation [x] LOW: stable  [] MODERATE: Evolving  [] HIGH: Unstable     Decision Making/ Complexity Score: low         Goals:  Short Term Goals: In 4 weeks   1.Pt to be educated on HEP.  2.Patient to increase GH ROM to 150 degrees flexion or better    3.Patient to increase strength to 3+/5 with external rotation or better  4.Patient to have pain less than 5/10 or better at all times.  5.Patient to improve score on the FOTO by 10%.  6. Pt to be educated on postural and body mechanics awareness.    Long Term Goals: In 10 weeks  1. Patient to improve score on the FOTO to 66.  2. Patient to demo increase in UE strength to 4/5 with flexion and rotation  3. Patient to have decreased pain to 2/10 at all times.  4. Patient to demo increase GH ROM to 65 degrees or better with Internal rotation and reaching behind back to T9 or better  5. Patient to perform daily activities including dressing without help, lifting up to 5 # overhead and carrying up to 20 # or better in the R UE.      Plan   Plan of care Certification: 3/25/2024 to 6/3/24.    Outpatient Physical Therapy 2 times weekly for 10 weeks to include the following interventions: Cervical/Lumbar Traction, Electrical Stimulation IFC, NMES, Manual Therapy, Moist Heat/ Ice, Neuromuscular Re-ed, Patient Education, Self Care, Therapeutic Activities, Therapeutic Exercise, and DN.     Nithya Hummel, PT, CIDN, SFMA    Thank you for this referral.    These services are reasonable and necessary for the conditions set forth above while under my care.

## 2024-04-01 ENCOUNTER — CLINICAL SUPPORT (OUTPATIENT)
Dept: REHABILITATION | Facility: HOSPITAL | Age: 54
End: 2024-04-01
Payer: COMMERCIAL

## 2024-04-01 DIAGNOSIS — Z74.09 DECREASED FUNCTIONAL MOBILITY AND ENDURANCE: ICD-10-CM

## 2024-04-01 DIAGNOSIS — M25.611 DECREASED RANGE OF MOTION OF RIGHT SHOULDER: Primary | ICD-10-CM

## 2024-04-01 DIAGNOSIS — R29.898 DECREASED ROM OF NECK: ICD-10-CM

## 2024-04-01 PROCEDURE — 97112 NEUROMUSCULAR REEDUCATION: CPT | Mod: PN

## 2024-04-01 PROCEDURE — 97140 MANUAL THERAPY 1/> REGIONS: CPT | Mod: PN

## 2024-04-01 NOTE — PROGRESS NOTES
Physical Therapy Daily Treatment Note     Name: Yue Serrato  Clinic Number: [id number]    Therapy Diagnosis:   Encounter Diagnoses   Name Primary?    Decreased range of motion of right shoulder Yes    Decreased ROM of neck     Decreased functional mobility and endurance      Physician: Nilo Brito MD    Visit Date: 4/1/2024    Physician Orders: PT Eval and Treat   Medical Diagnosis from Referral:   M25.511,G89.29 (ICD-10-CM) - Chronic right shoulder pain   M75.31 (ICD-10-CM) - Calcific tendinitis of right shoulder   M67.911 (ICD-10-CM) - Tendinopathy of right rotator cuff      Evaluation Date: 3/25/2024  Authorization Period Expiration: 12/31/24  Plan of Care Expiration: 6/3/24  Visit # / Visits authorized: 2/20  Foto: 1/3     Precautions: Standard, DMII  Surgery 3/14/24    Weeks 1-2     Immobilization: Sling     Lifting restriction: 3-5 lbs (can of soup), no overhead reaching/lifting x2 weeks     Activity/work limitations: No overuse/repetitive activity     Week 3-5     Therapy: Start at the beginning of week 3     Immobilization: none     Return to work/activity: Per guidance of Physical therapist     Time In: 7:01 am  Time Out: 8:05am  Total Time: 65 minutes not including MH to R shoulder/neck    SUBJECTIVE     Today, pt reports: she had a few questions about her HEP.  She was compliant with home exercise program.  Response to previous treatment: no new issues  Functional change: feeling a little better with ROM and less stiffness after working on her HEP.    Pre-Treatment Pain: 3/10  Post-Treatment Pain: 3/10  Location: R shoulder  TREATMENT     Yue received therapeutic exercises to develop strength, endurance, ROM, and flexibility for 0 minutes including:        Yue received the following manual therapy techniques: Joint mobilizations, Manual traction, and Soft tissue Mobilization were applied to the: neck and R shoulder for 15 minutes, including:    Shoulder mobs A/P for flexion and  inferior glides for abduction/flexion and mobs for increasing superior capsule mobility; Grade I-II  Soft tissue mobs to bicep, infraspinatus, teres, subscapularis and lat R shoulder (very tight and multiple trigger points)    Yue participated in neuromuscular re-education activities to improve: Balance, Kinesthetic, and Proprioception for 50 minutes. The following activities were included:    Pendulums forward and lateral 1x 10    3 rounds of  Seated hand slides on table top for scapular protraction 1x 10  Seated shoulder external rotation at table top 1x 10  Seated shoulder slides R to L abduction 1 x10  Seated shoulder flexion (arm in supination) 1x 10    Wall slides with protraction at EROM 2x 10  Shoulder shrugs 5# R 10 # L 1x 10, 10# R 5# L 1x 10; 5# R 10 # L 1x 10  Supine protraction/retraction 3x 10 R  Supine chin tuck 3x 10  Supine chin tuck with flexion 3x 10  Seated shoulder external rotation 3x 10  Leaning into plinth with some body weight for serratus activation 3x 10      Yue participated in dynamic functional therapeutic activities to improve functional performance for 0  minutes, including:          Yue participated in gait training to improve functional mobility and safety for 0  minutes, including:      Yue received the following direct contact modalities after being cleared for contraindications:     Yue received the following supervised modalities after being cleared for contradictions:     Yue received hot pack for R shoulder 10 minutes to R shoulder and neck.    Yue received cold pack for 0 minutes to 0.  Yue received electrical stimulation for 0 minutes to 0      Home Exercises Provided and Patient Education Provided     Education/Self-Care provided: (during therex)    Patient educated on the importance of improved core and upper and lower extremity strength in order to improve alignment of the spine and upper and lower extremities with static positions and  dynamic movement.   Patient educated on the importance of strong core and lower extremity musculature in order to improve both static and dynamic balance, improve gait mechanics, reduce fall risk and improve household and community mobility.       Written Home Exercises Provided: Patient instructed to cont prior HEP.  Exercises were reviewed and Yue was able to demonstrate them prior to the end of the session.  Yue demonstrated good  understanding of the education provided.     See EMR under Patient Instructions for exercises provided 4/1/2024.    ASSESSMENT   Pt tolerated therex and manual therapy well but she has a lot of trigger points in the bicep and serratus limiting scapular motion as well as a very tender and tight lat and RTC tissues. Pt has limited tolerance to soft tissue to these areas so We will add in more soft tissue at the neck as well to free up the scapular motions and well as progress rotator cuff work more on the follow up. Today we reviewed her HEP more to improve quality and progressed to more advanced tasks. PT will stop her HEP here on the next session and have her focus on those tasks at home only and work on more advanced work here as well as manual therapy as the capsule is also tight.. Pt tolerated exercise well with reports of increased fatigue and moderate tolerable discomfort. Pt demonstrated good understanding of exercises and required moderate cueing to maintain proper form.      Yue Is progressing well towards her goals.   Pt prognosis is Good.     Pt will continue to benefit from skilled outpatient physical therapy to address the deficits listed in the problem list box on initial evaluation, provide pt/family education and to maximize pt's level of independence in the home and community environment.     Pt's spiritual, cultural and educational needs considered and pt agreeable to plan of care and goals.     Anticipated barriers to physical therapy: none    Goals:    Short Term Goals: In 4 weeks   1.Pt to be educated on HEP. progressing  2.Patient to increase GH ROM to 150 degrees flexion or better. progressing   3.Patient to increase strength to 3+/5 with external rotation or better. progressing  4.Patient to have pain less than 5/10 or better at all times.  5.Patient to improve score on the FOTO by 10%.  6. Pt to be educated on postural and body mechanics awareness.     Long Term Goals: In 10 weeks  1. Patient to improve score on the FOTO to 66.  2. Patient to demo increase in UE strength to 4/5 with flexion and rotation  3. Patient to have decreased pain to 2/10 at all times.  4. Patient to demo increase GH ROM to 65 degrees or better with Internal rotation and reaching behind back to T9 or better  5. Patient to perform daily activities including dressing without help, lifting up to 5 # overhead and carrying up to 20 # or better in the R UE.        Plan   Plan of care Certification: 3/25/2024 to 6/3/24.     Outpatient Physical Therapy 2 times weekly for 10 weeks to include the following interventions: Cervical/Lumbar Traction, Electrical Stimulation IFC, NMES, Manual Therapy, Moist Heat/ Ice, Neuromuscular Re-ed, Patient Education, Self Care, Therapeutic Activities, Therapeutic Exercise, and DN.      Continue Plan of Care (POC) and progress per patient tolerance.    Nithya Hummel, PT, CIDN, MA

## 2024-04-02 ENCOUNTER — TELEPHONE (OUTPATIENT)
Dept: UROLOGY | Facility: CLINIC | Age: 54
End: 2024-04-02

## 2024-04-02 ENCOUNTER — HOSPITAL ENCOUNTER (OUTPATIENT)
Dept: CARDIOLOGY | Facility: HOSPITAL | Age: 54
Discharge: HOME | End: 2024-04-02
Attending: UROLOGY
Payer: COMMERCIAL

## 2024-04-02 ENCOUNTER — HOSPITAL ENCOUNTER (OUTPATIENT)
Dept: RADIOLOGY | Facility: HOSPITAL | Age: 54
Discharge: HOME | End: 2024-04-02
Attending: UROLOGY
Payer: COMMERCIAL

## 2024-04-02 ENCOUNTER — PROCEDURE VISIT (OUTPATIENT)
Dept: UROLOGY | Facility: CLINIC | Age: 54
End: 2024-04-02
Payer: COMMERCIAL

## 2024-04-02 VITALS — BODY MASS INDEX: 29.8 KG/M2 | WEIGHT: 162.94 LBS

## 2024-04-02 DIAGNOSIS — Z01.818 PRE-OP TESTING: ICD-10-CM

## 2024-04-02 DIAGNOSIS — Z01.818 PRE-OP TESTING: Primary | ICD-10-CM

## 2024-04-02 DIAGNOSIS — N32.9 LESION OF BLADDER: ICD-10-CM

## 2024-04-02 DIAGNOSIS — R31.21 ASYMPTOMATIC MICROSCOPIC HEMATURIA: Primary | ICD-10-CM

## 2024-04-02 PROCEDURE — 93010 ELECTROCARDIOGRAM REPORT: CPT | Mod: ,,, | Performed by: STUDENT IN AN ORGANIZED HEALTH CARE EDUCATION/TRAINING PROGRAM

## 2024-04-02 PROCEDURE — 71046 X-RAY EXAM CHEST 2 VIEWS: CPT | Mod: TC

## 2024-04-02 PROCEDURE — 52000 CYSTOURETHROSCOPY: CPT | Mod: S$GLB,,, | Performed by: UROLOGY

## 2024-04-02 PROCEDURE — 99214 OFFICE O/P EST MOD 30 MIN: CPT | Mod: 25,S$GLB,, | Performed by: UROLOGY

## 2024-04-02 PROCEDURE — 93005 ELECTROCARDIOGRAM TRACING: CPT

## 2024-04-02 PROCEDURE — 71046 X-RAY EXAM CHEST 2 VIEWS: CPT | Mod: 26,,, | Performed by: RADIOLOGY

## 2024-04-02 RX ORDER — CEFAZOLIN SODIUM 2 G/50ML
2 SOLUTION INTRAVENOUS
Status: CANCELLED | OUTPATIENT
Start: 2024-04-02

## 2024-04-02 NOTE — PROGRESS NOTES
Chief Complaint:  Recurrent UTIs, microscopic hematuria    HPI:   2024 - patient returns today for cystoscopy, no recent UTIs    2024 - 54 yo female that presents for evaluation of recurrent UTIs and microscopic hematuria.  Patient notes that she has had UTIs all my life but more recently over the last 2-3 months she has had issues with recurrent infections.  Notes that it takes towards the end of the antibiotics cycle for her symptoms to improve, and then her symptoms will improve for 5-7 days and then will return.  Patient notes dysuria, urgency, and suprapubic discomfort.  Patient notes history of poorly-controlled diabetes, A1c was 12 and then improved to 8.5.  She has not currently sexually active, notes that the last time she had intercourse, she had discomfort and bleeding.  She denies any gross hematuria.  Denies family history of  cancers.      PMH:  Past Medical History:   Diagnosis Date    Anxiety     Depression     Diabetes mellitus type II     Diverticulitis 2012    History of abnormal cervical Pap smear     HSV infection     Hyperlipidemia     Hypertension        PSH:  Past Surgical History:   Procedure Laterality Date     SECTION      x3    COLONOSCOPY  2019    normal    ESOPHAGOGASTRODUODENOSCOPY N/A 2021    Procedure: EGD (ESOPHAGOGASTRODUODENOSCOPY);  Surgeon: Prerna Poole MD;  Location: [facility];  Service: Endoscopy;  Laterality: N/A;    ESOPHAGOGASTRODUODENOSCOPY N/A 3/20/2023    Procedure: EGD (ESOPHAGOGASTRODUODENOSCOPY);  Surgeon: Hector Reyes MD;  Location: [facility];  Service: Endoscopy;  Laterality: N/A;    LIPOMA RESECTION      TOTAL ABDOMINAL HYSTERECTOMY W/ BILATERAL SALPINGOOPHORECTOMY Bilateral 2005    dyplasia       Family History:  Family History   Problem Relation Age of Onset    Heart disease Mother         CAD    Diabetes Mother     Hypertension Mother     Hyperlipidemia Mother     COPD Mother     COPD Father     Diabetes Father      Hyperlipidemia Father     Hypertension Father     Heart disease Father         MI    Colon cancer Neg Hx        Social History:  Social History     Tobacco Use    Smoking status: Never     Passive exposure: Never    Smokeless tobacco: Never   Substance Use Topics    Alcohol use: Yes     Comment: occasionally    Drug use: No        Review of Systems:  General: No fever, chills  Skin: No rashes  Chest:  Denies cough and sputum production  Heart: Denies chest pain  Resp: Denies dyspnea  Abdomen: Denies diarrhea, abdominal pain, hematemesis, or blood in stool.  Musculoskeletal: No joint stiffness or swelling. Denies back pain.  : see HPI  Neuro: no dizziness or weakness    Allergies:  Morphine    Medications:    Current Outpatient Medications:     acetaminophen (TYLENOL) 500 MG tablet, Tylenol Take @0700 No date recorded No form recorded No frequency recorded No route recorded No set duration recorded No set duration amount recorded suspended No dosage strength recorded No dosage strength units of measure recorded, Disp: , Rfl:     ALPRAZolam (XANAX) 0.25 MG tablet, Take 0.25 mg by mouth., Disp: , Rfl:     atorvastatin (LIPITOR) 10 MG tablet, Take 10 mg by mouth once daily., Disp: , Rfl:     atorvastatin (LIPITOR) 40 MG tablet, Take 40 mg by mouth., Disp: , Rfl:     blood sugar diagnostic (ACCU-CHEK SMARTVIEW TEST STRIP) Strp, Check BS fastin and 2 hrs after biggest meal, Disp: 60 strip, Rfl: 0    diazePAM (VALIUM) 5 MG tablet, Take one tab 30 mins prior to procedure, Disp: 1 tablet, Rfl: 0    erlotinib HCl (ERLOTINIB ORAL), erlotinib Take No date recorded No form recorded No frequency recorded No route recorded No set duration recorded No set duration amount recorded active No dosage strength recorded No dosage strength units of measure recorded, Disp: , Rfl:     fenofibrate (TRICOR) 48 MG tablet, 1 tablet Orally Once a day for 30 day(s), Disp: , Rfl:     fenofibrate 160 MG Tab, Take 160 mg by mouth once daily.,  Disp: , Rfl:     fluticasone propionate (FLONASE) 50 mcg/actuation nasal spray, by Each Nostril route., Disp: , Rfl:     HYDROcodone-acetaminophen (NORCO) 5-325 mg per tablet, Take 1 tablet by mouth every 6 to 8 hours as needed for Pain., Disp: 28 tablet, Rfl: 0    lancets (ACCU-CHEK SOFTCLIX LANCETS) Misc, Check BS fastin and 2 hrs after biggest meal, Disp: 60 each, Rfl: 0    nabumetone (RELAFEN) 750 MG tablet, Take 1 tablet (750 mg total) by mouth 2 (two) times daily., Disp: 60 tablet, Rfl: 1    ONETOUCH DELICA PLUS LANCET 33 gauge Misc, Apply topically., Disp: , Rfl:     ONETOUCH VERIO FLEX METER Misc, use as directed, Disp: , Rfl:     OZEMPIC 1 mg/dose (4 mg/3 mL), Inject 2 mg into the skin every 7 days., Disp: , Rfl:     pantoprazole (PROTONIX) 40 MG tablet, Take 1 tablet (40 mg total) by mouth once daily., Disp: 30 tablet, Rfl: 3    TRESIBA FLEXTOUCH U-200 200 unit/mL (3 mL) InPn, INJECT 100 UNITS SUBCUTANEOUSLY ONCE DAILY, Disp: , Rfl: 5    TRINTELLIX 10 mg Tab, Take 1 tablet by mouth., Disp: , Rfl:     TRULICITY 1.5 mg/0.5 mL PnIj, INJECT 1 & 1 2 (ONE & ONE HALF) MG SUBCUTANEOUSLY ONCE A WEEK, Disp: , Rfl: 5    valACYclovir (VALTREX) 1000 MG tablet, Take 1,000 mg by mouth 3 (three) times daily., Disp: , Rfl:     estradioL (VAGIFEM) 10 mcg Tab, Place 1 tablet (10 mcg total) vaginally twice a week. (Patient not taking: Reported on 2/14/2023), Disp: 8 tablet, Rfl: 11    nystatin (MYCOSTATIN) cream, Apply topically 2 (two) times daily. for 7 days, Disp: 30 g, Rfl: 0    Physical Exam:  There were no vitals filed for this visit.  Body mass index is 29.8 kg/m².  General: awake, alert, cooperative  Head: NC/AT  Ears: external ears normal  Eyes: sclera normal  Lungs: normal inspiration, NAD  Heart: well-perfused  Skin: The skin is warm and dry  Ext: No c/c/e.  Neuro: grossly intact, AOx3    RADIOLOGY:  US RETROPERITONEAL KIDNEY AND BLADDER     Indication: [facility] Pacs merged  reason for exam: R30.0:Dysuria R31.9:Hematuria, unspecified     Additional information:     Comparison: September 27, 2022 ultrasound abdomen complete     RENAL LENGTH   11.3 x 4.9 x 4.3 cm in length on the right   11.4 x 5.1 x 4.9 cm in length on the left.     RENAL VOLUME   Right: 1/25/2021 ml   Left:   150.1 ml     Cortical echotexture is normal.   No solid mass, shadowing calculus, or hydronephrosis is seen.     Note:     In adults, the normal kidney is 10-14 cm long in males and 9-13 cm long in females, 3-5 cm wide.     Normal volume 110 to 190 ml in men and 90 to 150 ml in women. Renal volume can be estimated by multiply length times width times height divided by 2.     Imaging performed of the bladder. The bladder is incompletely distended at time of imaging with a prevoiding volume of 98.2 cc. No suspicious bladder mass identified on submitted images.     Note made of echogenic appearance of the liver suggesting hepatic steatosis.     IMPRESSION:     No acute or focal suspicious abnormality of the kidneys.   Note made of echogenic appearance of the liver compatible hepatic steatosis   The bladder is partially distended at the time of imaging with no obvious focal abnormality identified.     LABS:  I personally reviewed the following lab values:  Lab Results   Component Value Date    WBC 7.00 02/09/2023    HGB 14.0 02/09/2023    HCT 41.5 02/09/2023     02/09/2023     02/09/2023    K 4.3 02/09/2023     02/09/2023    CREATININE 0.8 02/09/2023    BUN 16 02/09/2023    CO2 20 (L) 02/09/2023    TSH 1.758 09/10/2013    INR 0.9 06/14/2018    HGBA1C 9.9 (H) 12/12/2013    CHOL 223 (H) 12/12/2013    TRIG 232 (H) 12/12/2013    HDL 48 12/12/2013    ALT 18 02/09/2023    AST 16 02/09/2023     URINALYSIS:  Urinalysis obtained in clinic today specific gravity 1.025 pH 5.5 trace intact blood small LE, negative for all other parameters    Procedure:  Diagnostic Cystoscopy    Indications:  Hematuria    UA:  normal, see lab results for values    Procedure in Detail: After proper consents were obtained, the patient was prepped and draped in normal sterile fashion for diagnostic cystoscopy. 5 ml of lidocaine jelly was instilled in the urethra. The flexible cystoscope was then introduced into the urethra, and advanced into the bladder under direct vision. The urethral mucosa appeared normal, and no strictures were noted. The sphincter was normal. The bladder neck was normal. Inspection of the interior of the bladder was then carried out. The trigone was unremarkable, with no mucosal lesions. The ureteral orifices were normal in position and configuration. Systematic inspection of the mucosa of the bladder it was then carried out, rotating the cystoscope so that all areas of the left and right lateral walls, the dome of the bladder, and the posterior wall were all visualized.  Along the left posterior aspect of the bladder, erythematous tissue was noted.  The cystoscope was then advanced further into the bladder, and maximum deflection of the scope was performed so that the bladder neck could be inspected. No mucosal lesions were noted there. The cystoscope was then removed, and the procedure terminated. Patient tolerated the procedure well. No complications.     Findings:  Erythematous tissue noted along the left posterior aspect of the bladder      Assessment/Plan:   Yue Serrato is a 54 y.o. female with:    Bladder lesion - to OR for cystoscopy and bladder biopsy 4/17    Recurrent UTIs - no culture data available for review, urine for culture today, pelvic floor dysfunction also being considered    Jovi Jackson MD  Urology

## 2024-04-02 NOTE — H&P (VIEW-ONLY)
Chief Complaint:  Recurrent UTIs, microscopic hematuria    HPI:   2024 - patient returns today for cystoscopy, no recent UTIs    2024 - 54 yo female that presents for evaluation of recurrent UTIs and microscopic hematuria.  Patient notes that she has had UTIs all my life but more recently over the last 2-3 months she has had issues with recurrent infections.  Notes that it takes towards the end of the antibiotics cycle for her symptoms to improve, and then her symptoms will improve for 5-7 days and then will return.  Patient notes dysuria, urgency, and suprapubic discomfort.  Patient notes history of poorly-controlled diabetes, A1c was 12 and then improved to 8.5.  She has not currently sexually active, notes that the last time she had intercourse, she had discomfort and bleeding.  She denies any gross hematuria.  Denies family history of  cancers.      PMH:  Past Medical History:   Diagnosis Date    Anxiety     Depression     Diabetes mellitus type II     Diverticulitis 2012    History of abnormal cervical Pap smear     HSV infection     Hyperlipidemia     Hypertension        PSH:  Past Surgical History:   Procedure Laterality Date     SECTION      x3    COLONOSCOPY  2019    normal    ESOPHAGOGASTRODUODENOSCOPY N/A 2021    Procedure: EGD (ESOPHAGOGASTRODUODENOSCOPY);  Surgeon: Prerna Poole MD;  Location: [facility];  Service: Endoscopy;  Laterality: N/A;    ESOPHAGOGASTRODUODENOSCOPY N/A 3/20/2023    Procedure: EGD (ESOPHAGOGASTRODUODENOSCOPY);  Surgeon: Hector Reyes MD;  Location: [facility];  Service: Endoscopy;  Laterality: N/A;    LIPOMA RESECTION      TOTAL ABDOMINAL HYSTERECTOMY W/ BILATERAL SALPINGOOPHORECTOMY Bilateral 2005    dyplasia       Family History:  Family History   Problem Relation Age of Onset    Heart disease Mother         CAD    Diabetes Mother     Hypertension Mother     Hyperlipidemia Mother     COPD Mother     COPD Father     Diabetes Father      Hyperlipidemia Father     Hypertension Father     Heart disease Father         MI    Colon cancer Neg Hx        Social History:  Social History     Tobacco Use    Smoking status: Never     Passive exposure: Never    Smokeless tobacco: Never   Substance Use Topics    Alcohol use: Yes     Comment: occasionally    Drug use: No        Review of Systems:  General: No fever, chills  Skin: No rashes  Chest:  Denies cough and sputum production  Heart: Denies chest pain  Resp: Denies dyspnea  Abdomen: Denies diarrhea, abdominal pain, hematemesis, or blood in stool.  Musculoskeletal: No joint stiffness or swelling. Denies back pain.  : see HPI  Neuro: no dizziness or weakness    Allergies:  Morphine    Medications:    Current Outpatient Medications:     acetaminophen (TYLENOL) 500 MG tablet, Tylenol Take @0700 No date recorded No form recorded No frequency recorded No route recorded No set duration recorded No set duration amount recorded suspended No dosage strength recorded No dosage strength units of measure recorded, Disp: , Rfl:     ALPRAZolam (XANAX) 0.25 MG tablet, Take 0.25 mg by mouth., Disp: , Rfl:     atorvastatin (LIPITOR) 10 MG tablet, Take 10 mg by mouth once daily., Disp: , Rfl:     atorvastatin (LIPITOR) 40 MG tablet, Take 40 mg by mouth., Disp: , Rfl:     blood sugar diagnostic (ACCU-CHEK SMARTVIEW TEST STRIP) Strp, Check BS fastin and 2 hrs after biggest meal, Disp: 60 strip, Rfl: 0    diazePAM (VALIUM) 5 MG tablet, Take one tab 30 mins prior to procedure, Disp: 1 tablet, Rfl: 0    erlotinib HCl (ERLOTINIB ORAL), erlotinib Take No date recorded No form recorded No frequency recorded No route recorded No set duration recorded No set duration amount recorded active No dosage strength recorded No dosage strength units of measure recorded, Disp: , Rfl:     fenofibrate (TRICOR) 48 MG tablet, 1 tablet Orally Once a day for 30 day(s), Disp: , Rfl:     fenofibrate 160 MG Tab, Take 160 mg by mouth once daily.,  Disp: , Rfl:     fluticasone propionate (FLONASE) 50 mcg/actuation nasal spray, by Each Nostril route., Disp: , Rfl:     HYDROcodone-acetaminophen (NORCO) 5-325 mg per tablet, Take 1 tablet by mouth every 6 to 8 hours as needed for Pain., Disp: 28 tablet, Rfl: 0    lancets (ACCU-CHEK SOFTCLIX LANCETS) Misc, Check BS fastin and 2 hrs after biggest meal, Disp: 60 each, Rfl: 0    nabumetone (RELAFEN) 750 MG tablet, Take 1 tablet (750 mg total) by mouth 2 (two) times daily., Disp: 60 tablet, Rfl: 1    ONETOUCH DELICA PLUS LANCET 33 gauge Misc, Apply topically., Disp: , Rfl:     ONETOUCH VERIO FLEX METER Misc, use as directed, Disp: , Rfl:     OZEMPIC 1 mg/dose (4 mg/3 mL), Inject 2 mg into the skin every 7 days., Disp: , Rfl:     pantoprazole (PROTONIX) 40 MG tablet, Take 1 tablet (40 mg total) by mouth once daily., Disp: 30 tablet, Rfl: 3    TRESIBA FLEXTOUCH U-200 200 unit/mL (3 mL) InPn, INJECT 100 UNITS SUBCUTANEOUSLY ONCE DAILY, Disp: , Rfl: 5    TRINTELLIX 10 mg Tab, Take 1 tablet by mouth., Disp: , Rfl:     TRULICITY 1.5 mg/0.5 mL PnIj, INJECT 1 & 1 2 (ONE & ONE HALF) MG SUBCUTANEOUSLY ONCE A WEEK, Disp: , Rfl: 5    valACYclovir (VALTREX) 1000 MG tablet, Take 1,000 mg by mouth 3 (three) times daily., Disp: , Rfl:     estradioL (VAGIFEM) 10 mcg Tab, Place 1 tablet (10 mcg total) vaginally twice a week. (Patient not taking: Reported on 2/14/2023), Disp: 8 tablet, Rfl: 11    nystatin (MYCOSTATIN) cream, Apply topically 2 (two) times daily. for 7 days, Disp: 30 g, Rfl: 0    Physical Exam:  There were no vitals filed for this visit.  Body mass index is 29.8 kg/m².  General: awake, alert, cooperative  Head: NC/AT  Ears: external ears normal  Eyes: sclera normal  Lungs: normal inspiration, NAD  Heart: well-perfused  Skin: The skin is warm and dry  Ext: No c/c/e.  Neuro: grossly intact, AOx3    RADIOLOGY:  US RETROPERITONEAL KIDNEY AND BLADDER     Indication: [facility] Pacs merged  reason for exam: R30.0:Dysuria R31.9:Hematuria, unspecified     Additional information:     Comparison: September 27, 2022 ultrasound abdomen complete     RENAL LENGTH   11.3 x 4.9 x 4.3 cm in length on the right   11.4 x 5.1 x 4.9 cm in length on the left.     RENAL VOLUME   Right: 1/25/2021 ml   Left:   150.1 ml     Cortical echotexture is normal.   No solid mass, shadowing calculus, or hydronephrosis is seen.     Note:     In adults, the normal kidney is 10-14 cm long in males and 9-13 cm long in females, 3-5 cm wide.     Normal volume 110 to 190 ml in men and 90 to 150 ml in women. Renal volume can be estimated by multiply length times width times height divided by 2.     Imaging performed of the bladder. The bladder is incompletely distended at time of imaging with a prevoiding volume of 98.2 cc. No suspicious bladder mass identified on submitted images.     Note made of echogenic appearance of the liver suggesting hepatic steatosis.     IMPRESSION:     No acute or focal suspicious abnormality of the kidneys.   Note made of echogenic appearance of the liver compatible hepatic steatosis   The bladder is partially distended at the time of imaging with no obvious focal abnormality identified.     LABS:  I personally reviewed the following lab values:  Lab Results   Component Value Date    WBC 7.00 02/09/2023    HGB 14.0 02/09/2023    HCT 41.5 02/09/2023     02/09/2023     02/09/2023    K 4.3 02/09/2023     02/09/2023    CREATININE 0.8 02/09/2023    BUN 16 02/09/2023    CO2 20 (L) 02/09/2023    TSH 1.758 09/10/2013    INR 0.9 06/14/2018    HGBA1C 9.9 (H) 12/12/2013    CHOL 223 (H) 12/12/2013    TRIG 232 (H) 12/12/2013    HDL 48 12/12/2013    ALT 18 02/09/2023    AST 16 02/09/2023     URINALYSIS:  Urinalysis obtained in clinic today specific gravity 1.025 pH 5.5 trace intact blood small LE, negative for all other parameters    Procedure:  Diagnostic Cystoscopy    Indications:  Hematuria    UA:  normal, see lab results for values    Procedure in Detail: After proper consents were obtained, the patient was prepped and draped in normal sterile fashion for diagnostic cystoscopy. 5 ml of lidocaine jelly was instilled in the urethra. The flexible cystoscope was then introduced into the urethra, and advanced into the bladder under direct vision. The urethral mucosa appeared normal, and no strictures were noted. The sphincter was normal. The bladder neck was normal. Inspection of the interior of the bladder was then carried out. The trigone was unremarkable, with no mucosal lesions. The ureteral orifices were normal in position and configuration. Systematic inspection of the mucosa of the bladder it was then carried out, rotating the cystoscope so that all areas of the left and right lateral walls, the dome of the bladder, and the posterior wall were all visualized.  Along the left posterior aspect of the bladder, erythematous tissue was noted.  The cystoscope was then advanced further into the bladder, and maximum deflection of the scope was performed so that the bladder neck could be inspected. No mucosal lesions were noted there. The cystoscope was then removed, and the procedure terminated. Patient tolerated the procedure well. No complications.     Findings:  Erythematous tissue noted along the left posterior aspect of the bladder      Assessment/Plan:   Yue Serrato is a 54 y.o. female with:    Bladder lesion - to OR for cystoscopy and bladder biopsy 4/17    Recurrent UTIs - no culture data available for review, urine for culture today, pelvic floor dysfunction also being considered    Jovi Jackson MD  Urology

## 2024-04-03 LAB
OHS QRS DURATION: 84 MS
OHS QTC CALCULATION: 440 MS

## 2024-04-10 ENCOUNTER — TELEPHONE (OUTPATIENT)
Dept: PREADMISSION TESTING | Facility: HOSPITAL | Age: 54
End: 2024-04-10
Payer: COMMERCIAL

## 2024-04-10 RX ORDER — CLONAZEPAM 0.5 MG/1
0.5 TABLET ORAL 2 TIMES DAILY PRN
COMMUNITY

## 2024-04-10 NOTE — TELEPHONE ENCOUNTER
Called [facility] care at [phone] and spoke to Nurse Paola. She will be faxing us the A1C drawn last week. Fax number provided [phone].

## 2024-04-16 ENCOUNTER — CLINICAL SUPPORT (OUTPATIENT)
Dept: REHABILITATION | Facility: HOSPITAL | Age: 54
End: 2024-04-16
Payer: COMMERCIAL

## 2024-04-16 ENCOUNTER — ANESTHESIA EVENT (OUTPATIENT)
Dept: SURGERY | Facility: HOSPITAL | Age: 54
End: 2024-04-16
Payer: COMMERCIAL

## 2024-04-16 ENCOUNTER — TELEPHONE (OUTPATIENT)
Dept: PREADMISSION TESTING | Facility: HOSPITAL | Age: 54
End: 2024-04-16
Payer: COMMERCIAL

## 2024-04-16 DIAGNOSIS — M25.611 DECREASED RANGE OF MOTION OF RIGHT SHOULDER: Primary | ICD-10-CM

## 2024-04-16 DIAGNOSIS — Z74.09 DECREASED FUNCTIONAL MOBILITY AND ENDURANCE: ICD-10-CM

## 2024-04-16 DIAGNOSIS — R29.898 DECREASED ROM OF NECK: ICD-10-CM

## 2024-04-16 PROCEDURE — 97010 HOT OR COLD PACKS THERAPY: CPT | Mod: PN

## 2024-04-16 PROCEDURE — 97140 MANUAL THERAPY 1/> REGIONS: CPT | Mod: PN

## 2024-04-16 PROCEDURE — 97112 NEUROMUSCULAR REEDUCATION: CPT | Mod: PN

## 2024-04-16 NOTE — ANESTHESIA PREPROCEDURE EVALUATION
04/16/2024  Yue Serrato is a 54 y.o., female.      Pre-op Assessment    I have reviewed the Patient Summary Reports.    I have reviewed the NPO Status.   I have reviewed the Medications.     Review of Systems  Anesthesia Hx:   History of prior surgery of interest to airway management or planning:             Hematology/Oncology:  Hematology Normal                                     Cardiovascular:     Hypertension           hyperlipidemia                       Hypertension         Pulmonary:  Pulmonary Normal                       Renal/:  Renal/ Normal                 Hepatic/GI:     GERD          Liver Disease        Endocrine:  Diabetes    Diabetes                      Psych:  Psychiatric History                     Anesthesia Plan  Type of Anesthesia, risks & benefits discussed:    Anesthesia Type: Gen ETT, Gen Supraglottic Airway  Intra-op Monitoring Plan: Standard ASA Monitors  Post Op Pain Control Plan: multimodal analgesia and IV/PO Opioids PRN  Induction:  IV  Informed Consent: Informed consent signed with the Patient and all parties understand the risks and agree with anesthesia plan.  All questions answered. Patient consented to blood products? No  ASA Score: 3  Day of Surgery Review of History & Physical: H&P Update referred to the surgeon/provider.    Ready For Surgery From Anesthesia Perspective.     .

## 2024-04-16 NOTE — PROGRESS NOTES
Physical Therapy Daily Treatment Note     Name: Yue Serrato  Clinic Number: [id number]    Therapy Diagnosis:   Encounter Diagnoses   Name Primary?    Decreased range of motion of right shoulder Yes    Decreased ROM of neck     Decreased functional mobility and endurance        Physician: Nilo Brito MD    Visit Date: 4/16/2024    Physician Orders: PT Eval and Treat   Medical Diagnosis from Referral:   M25.511,G89.29 (ICD-10-CM) - Chronic right shoulder pain   M75.31 (ICD-10-CM) - Calcific tendinitis of right shoulder   M67.911 (ICD-10-CM) - Tendinopathy of right rotator cuff      Evaluation Date: 3/25/2024  Authorization Period Expiration: 12/31/24  Plan of Care Expiration: 6/3/24  Visit # / Visits authorized: 3/20  Foto: 1/3     Precautions: Standard, DMII  Surgery 3/14/24    Weeks 1-2     Immobilization: Sling     Lifting restriction: 3-5 lbs (can of soup), no overhead reaching/lifting x2 weeks     Activity/work limitations: No overuse/repetitive activity     Week 3-5     Therapy: Start at the beginning of week 3     Immobilization: none     Return to work/activity: Per guidance of Physical therapist     Time In: 9:08 am  Time Out: 10:12 am  Total Billable Time: 54 minutes including MH to R shoulder/neck including tech and PT one on one time    SUBJECTIVE     Today, pt reports: she has not been able to attend consistently due to multiple medical issues and now is having a bladder scan as they saw something irregular so she is not sure if she will be able to attend well at this time.   She was compliant with home exercise program.  Response to previous treatment: no new issues  Functional change: feeling a little better with ROM and less stiffness after working on her HEP.    Pre-Treatment Pain: 3/10  Post-Treatment Pain: 3/10  Location: R shoulder      OBJECTIVE     R shoulder ROM:  Shoulder flexion  165  Shoulder abduction 165  External rotation 60       TREATMENT     Yue received therapeutic  exercises to develop strength, endurance, ROM, and flexibility for 0 minutes including:      Yue received the following manual therapy techniques: Joint mobilizations, Manual traction, and Soft tissue Mobilization were applied to the: neck and R shoulder for 24 minutes, including:    Shoulder mobs A/P for flexion and inferior glides for abduction/flexion and mobs for increasing superior capsule mobility; Grade I-II  Soft tissue mobs to bicep, infraspinatus, teres, subscapularis and lat R shoulder (very tight and multiple trigger points)  Scapular mobs  PA to the T spine grade II-IV with 2 cavitations in mid spine    Yue participated in neuromuscular re-education activities to improve: Balance, Kinesthetic, and Proprioception for 30 minutes. The following activities were included:    Supine scapular protraction 1x 10; add 4# 3x 10  Prone scapular depression 2x 10  Prone scapular depression with prone T 2 x8 B with max cues on R  Seated shoulder external rotation with red theraband 3x 10  Bicep curls 4# 3x 10 B with cues on scapular retract/depression    Deferred:  3 rounds of  Seated hand slides on table top for scapular protraction 1x 10  Seated shoulder external rotation at table top 1x 10  Seated shoulder slides R to L abduction 1 x10  Seated shoulder flexion (arm in supination) 1x 10    Deferred:  Wall slides with protraction at EROM 2x 10  Shoulder shrugs 5# R 10 # L 1x 10, 10# R 5# L 1x 10; 5# R 10 # L 1x 10  Supine protraction/retraction 3x 10 R  Supine chin tuck 3x 10  Supine chin tuck with flexion 3x 10  Seated shoulder external rotation 3x 10  Leaning into plinth with some body weight for serratus activation 3x 10      Yue participated in dynamic functional therapeutic activities to improve functional performance for 0  minutes, including:      Yue participated in gait training to improve functional mobility and safety for 0  minutes, including:      Yue received the following direct  contact modalities after being cleared for contraindications:     Yue received the following supervised modalities after being cleared for contradictions:     Yue received hot pack for R shoulder 10 minutes to R shoulder and neck.    Yue received cold pack for 0 minutes to 0.  Yue received electrical stimulation for 0 minutes to 0      Home Exercises Provided and Patient Education Provided     Education/Self-Care provided: (during therex)    Patient educated on the importance of improved core and upper and lower extremity strength in order to improve alignment of the spine and upper and lower extremities with static positions and dynamic movement.   Patient educated on the importance of strong core and lower extremity musculature in order to improve both static and dynamic balance, improve gait mechanics, reduce fall risk and improve household and community mobility.       Written Home Exercises Provided: Patient instructed to cont prior HEP.  Exercises were reviewed and Yue was able to demonstrate them prior to the end of the session.  Yue demonstrated good  understanding of the education provided.     See EMR under Patient Instructions for exercises provided 4/1/2024.    ASSESSMENT   Pt tolerated therex and manual therapy but still has severe trigger points in the pect minor and subscapularis limiting her scapular motion. Tension in the neck also limits scapular motion. Pt has not been able to attend consistently limiting our progress. . Pt tolerated exercise well with reports of increased fatigue and moderate tolerable discomfort. Pt demonstrated good understanding of exercises and required moderate cueing to maintain proper form.      Yue Is progressing well towards her goals.   Pt prognosis is Good.     Pt will continue to benefit from skilled outpatient physical therapy to address the deficits listed in the problem list box on initial evaluation, provide pt/family education and to  maximize pt's level of independence in the home and community environment.     Pt's spiritual, cultural and educational needs considered and pt agreeable to plan of care and goals.     Anticipated barriers to physical therapy: none    Goals:   Short Term Goals: In 4 weeks   1.Pt to be educated on HEP. progressing  2.Patient to increase GH ROM to 150 degrees flexion or better. met  3.Patient to increase strength to 3+/5 with external rotation or better. progressing  4.Patient to have pain less than 5/10 or better at all times.  5.Patient to improve score on the FOTO by 10%.  6. Pt to be educated on postural and body mechanics awareness.     Long Term Goals: In 10 weeks  1. Patient to improve score on the FOTO to 66.  2. Patient to demo increase in UE strength to 4/5 with flexion and rotation  3. Patient to have decreased pain to 2/10 at all times.  4. Patient to demo increase GH ROM to 65 degrees or better with Internal rotation and reaching behind back to T9 or better  5. Patient to perform daily activities including dressing without help, lifting up to 5 # overhead and carrying up to 20 # or better in the R UE.        Plan   Plan of care Certification: 3/25/2024 to 6/3/24.     Outpatient Physical Therapy 2 times weekly for 10 weeks to include the following interventions: Cervical/Lumbar Traction, Electrical Stimulation IFC, NMES, Manual Therapy, Moist Heat/ Ice, Neuromuscular Re-ed, Patient Education, Self Care, Therapeutic Activities, Therapeutic Exercise, and DN.      Continue Plan of Care (POC) and progress per patient tolerance.    Nithya Hummel, PT, CIDN, SFMA

## 2024-04-17 ENCOUNTER — ANESTHESIA (OUTPATIENT)
Dept: SURGERY | Facility: HOSPITAL | Age: 54
End: 2024-04-17
Payer: COMMERCIAL

## 2024-04-17 ENCOUNTER — HOSPITAL ENCOUNTER (OUTPATIENT)
Facility: HOSPITAL | Age: 54
Discharge: HOME | End: 2024-04-17
Attending: UROLOGY | Admitting: UROLOGY
Payer: COMMERCIAL

## 2024-04-17 VITALS
DIASTOLIC BLOOD PRESSURE: 76 MMHG | RESPIRATION RATE: 19 BRPM | HEART RATE: 82 BPM | SYSTOLIC BLOOD PRESSURE: 146 MMHG | TEMPERATURE: 98 F | BODY MASS INDEX: 30.14 KG/M2 | HEIGHT: 62 IN | WEIGHT: 163.81 LBS | OXYGEN SATURATION: 96 %

## 2024-04-17 DIAGNOSIS — R31.21 ASYMPTOMATIC MICROSCOPIC HEMATURIA: ICD-10-CM

## 2024-04-17 DIAGNOSIS — N32.9 LESION OF BLADDER: Primary | ICD-10-CM

## 2024-04-17 LAB
POCT GLUCOSE: 111 MG/DL (ref 70–110)
POCT GLUCOSE: 120 MG/DL (ref 70–110)

## 2024-04-17 PROCEDURE — 52204 CYSTOSCOPY W/BIOPSY(S): CPT | Mod: ,,, | Performed by: UROLOGY

## 2024-04-17 PROCEDURE — 25000003 PHARM REV CODE 250: Performed by: NURSE ANESTHETIST, CERTIFIED REGISTERED

## 2024-04-17 PROCEDURE — 27200651 HC AIRWAY, LMA: Performed by: NURSE ANESTHETIST, CERTIFIED REGISTERED

## 2024-04-17 PROCEDURE — 82962 GLUCOSE BLOOD TEST: CPT | Performed by: UROLOGY

## 2024-04-17 PROCEDURE — 71000033 HC RECOVERY, INTIAL HOUR: Performed by: UROLOGY

## 2024-04-17 PROCEDURE — 25000003 PHARM REV CODE 250: Performed by: UROLOGY

## 2024-04-17 PROCEDURE — 71000015 HC POSTOP RECOV 1ST HR: Performed by: UROLOGY

## 2024-04-17 PROCEDURE — D9220A PRA ANESTHESIA: Mod: ,,, | Performed by: NURSE ANESTHETIST, CERTIFIED REGISTERED

## 2024-04-17 PROCEDURE — 36000706: Performed by: UROLOGY

## 2024-04-17 PROCEDURE — 63600175 PHARM REV CODE 636 W HCPCS: Performed by: NURSE ANESTHETIST, CERTIFIED REGISTERED

## 2024-04-17 PROCEDURE — 63600175 PHARM REV CODE 636 W HCPCS: Performed by: UROLOGY

## 2024-04-17 PROCEDURE — 36000707: Performed by: UROLOGY

## 2024-04-17 PROCEDURE — 88305 TISSUE EXAM BY PATHOLOGIST: CPT | Mod: 26,,, | Performed by: PATHOLOGY

## 2024-04-17 PROCEDURE — 37000009 HC ANESTHESIA EA ADD 15 MINS: Performed by: UROLOGY

## 2024-04-17 PROCEDURE — 88305 TISSUE EXAM BY PATHOLOGIST: CPT | Performed by: PATHOLOGY

## 2024-04-17 PROCEDURE — 37000008 HC ANESTHESIA 1ST 15 MINUTES: Performed by: UROLOGY

## 2024-04-17 RX ORDER — FENTANYL CITRATE 50 UG/ML
INJECTION, SOLUTION INTRAMUSCULAR; INTRAVENOUS
Status: DISCONTINUED | OUTPATIENT
Start: 2024-04-17 | End: 2024-04-17

## 2024-04-17 RX ORDER — ONDANSETRON HYDROCHLORIDE 2 MG/ML
INJECTION, SOLUTION INTRAVENOUS
Status: DISCONTINUED | OUTPATIENT
Start: 2024-04-17 | End: 2024-04-17

## 2024-04-17 RX ORDER — LIDOCAINE HYDROCHLORIDE 20 MG/ML
INJECTION INTRAVENOUS
Status: DISCONTINUED | OUTPATIENT
Start: 2024-04-17 | End: 2024-04-17

## 2024-04-17 RX ORDER — PHENAZOPYRIDINE HYDROCHLORIDE 100 MG/1
100 TABLET, FILM COATED ORAL 3 TIMES DAILY PRN
Qty: 30 TABLET | Refills: 0 | Status: SHIPPED | OUTPATIENT
Start: 2024-04-17 | End: 2024-04-27

## 2024-04-17 RX ORDER — PROPOFOL 10 MG/ML
INJECTION, EMULSION INTRAVENOUS
Status: DISCONTINUED | OUTPATIENT
Start: 2024-04-17 | End: 2024-04-17

## 2024-04-17 RX ORDER — MIDAZOLAM HYDROCHLORIDE 1 MG/ML
INJECTION INTRAMUSCULAR; INTRAVENOUS
Status: DISCONTINUED | OUTPATIENT
Start: 2024-04-17 | End: 2024-04-17

## 2024-04-17 RX ORDER — DEXAMETHASONE SODIUM PHOSPHATE 4 MG/ML
INJECTION, SOLUTION INTRA-ARTICULAR; INTRALESIONAL; INTRAMUSCULAR; INTRAVENOUS; SOFT TISSUE
Status: DISCONTINUED | OUTPATIENT
Start: 2024-04-17 | End: 2024-04-17

## 2024-04-17 RX ORDER — SODIUM CHLORIDE, SODIUM LACTATE, POTASSIUM CHLORIDE, CALCIUM CHLORIDE 600; 310; 30; 20 MG/100ML; MG/100ML; MG/100ML; MG/100ML
INJECTION, SOLUTION INTRAVENOUS CONTINUOUS PRN
Status: DISCONTINUED | OUTPATIENT
Start: 2024-04-17 | End: 2024-04-17

## 2024-04-17 RX ADMIN — MIDAZOLAM HYDROCHLORIDE 2 MG: 1 INJECTION INTRAMUSCULAR; INTRAVENOUS at 12:04

## 2024-04-17 RX ADMIN — LIDOCAINE HYDROCHLORIDE 50 MG: 20 INJECTION INTRAVENOUS at 12:04

## 2024-04-17 RX ADMIN — DEXAMETHASONE SODIUM PHOSPHATE 4 MG: 4 INJECTION, SOLUTION INTRA-ARTICULAR; INTRALESIONAL; INTRAMUSCULAR; INTRAVENOUS; SOFT TISSUE at 12:04

## 2024-04-17 RX ADMIN — ONDANSETRON 4 MG: 2 INJECTION INTRAMUSCULAR; INTRAVENOUS at 12:04

## 2024-04-17 RX ADMIN — SODIUM CHLORIDE, SODIUM LACTATE, POTASSIUM CHLORIDE, AND CALCIUM CHLORIDE: 600; 310; 30; 20 INJECTION, SOLUTION INTRAVENOUS at 12:04

## 2024-04-17 RX ADMIN — PROPOFOL 200 MG: 10 INJECTION, EMULSION INTRAVENOUS at 12:04

## 2024-04-17 RX ADMIN — CEFAZOLIN 2 G: 2 INJECTION, POWDER, FOR SOLUTION INTRAMUSCULAR; INTRAVENOUS at 12:04

## 2024-04-17 RX ADMIN — FENTANYL CITRATE 50 MCG: 50 INJECTION, SOLUTION INTRAMUSCULAR; INTRAVENOUS at 12:04

## 2024-04-17 NOTE — TRANSFER OF CARE
"Anesthesia Transfer of Care Note    Patient: Yue Serrato    Procedure(s) Performed: Procedure(s) (LRB):  CYSTOSCOPY, WITH BLADDER BIOPSY, WITH FULGURATION IF INDICATED (N/A)    Patient location: PACU    Anesthesia Type: general    Transport from OR: Transported from OR on room air with adequate spontaneous ventilation    Post pain: adequate analgesia    Post assessment: no apparent anesthetic complications    Post vital signs: stable    Level of consciousness: awake, alert and oriented    Nausea/Vomiting: no nausea/vomiting    Complications: none    Transfer of care protocol was followed      Last vitals: Visit Vitals  BP (!) 185/83 (BP Location: Left arm, Patient Position: Sitting)   Pulse 86   Temp 36.4 °C (97.5 °F) (Temporal)   Resp 19   Ht 5' 2" (1.575 m)   Wt 74.3 kg (163 lb 12.8 oz)   SpO2 98%   Breastfeeding No   BMI 29.96 kg/m²     "

## 2024-04-17 NOTE — DISCHARGE SUMMARY
[facility]  Discharge Note  Short Stay    Procedure(s) (LRB):  CYSTOSCOPY, WITH BLADDER BIOPSY, WITH FULGURATION IF INDICATED (N/A)      OUTCOME: Patient tolerated treatment/procedure well without complication and is now ready for discharge.    DISPOSITION: Home or Self Care    FINAL DIAGNOSIS:  Lesion of bladder    FOLLOWUP: In clinic    DISCHARGE INSTRUCTIONS:    Discharge Procedure Orders   Diet Adult Regular     Notify your health care provider if you experience any of the following:  temperature >100.4     Notify your health care provider if you experience any of the following:  persistent nausea and vomiting or diarrhea     Notify your health care provider if you experience any of the following:  severe uncontrolled pain     Notify your health care provider if you experience any of the following:  difficulty breathing or increased cough     Notify your health care provider if you experience any of the following:  severe persistent headache     Notify your health care provider if you experience any of the following:  worsening rash     Notify your health care provider if you experience any of the following:  persistent dizziness, light-headedness, or visual disturbances     Notify your health care provider if you experience any of the following:  increased confusion or weakness     Activity as tolerated

## 2024-04-17 NOTE — ANESTHESIA POSTPROCEDURE EVALUATION
Anesthesia Post Evaluation    Patient: Yue Serrato    Procedure(s) Performed: Procedure(s) (LRB):  CYSTOSCOPY, WITH BLADDER BIOPSY, WITH FULGURATION IF INDICATED (N/A)    Final Anesthesia Type: general      Patient location during evaluation: PACU  Patient participation: Yes- Able to Participate  Level of consciousness: awake and alert and oriented  Post-procedure vital signs: reviewed and stable  Pain management: adequate  Airway patency: patent    PONV status at discharge: No PONV  Anesthetic complications: no      Cardiovascular status: blood pressure returned to baseline, stable and hemodynamically stable  Respiratory status: unassisted  Hydration status: euvolemic  Follow-up not needed.              Vitals Value Taken Time   /76 04/17/24 1326   Temp 36.9 °C (98.4 °F) 04/17/24 1258   Pulse 83 04/17/24 1328   Resp 19 04/17/24 1328   SpO2 96 % 04/17/24 1328   Vitals shown include unfiled device data.      Event Time   Out of Recovery 13:17:03         Pain/Breezy Score: Breezy Score: 9 (4/17/2024  1:05 PM)

## 2024-04-17 NOTE — LETTER
April 17, 2024         [address] [facility] [place] [address]  Phone: [phone]  Fax: [phone]       Patient: Yue Serrato   YOB: 1970  Date of Visit: 04/17/2024    To Whom It May Concern:    Elvira Serrato  was at [facility] on 04/17/2024. The patient may return to work/school on 4/22/24 with no restrictions. If you have any questions or concerns, or if I can be of further assistance, please do not hesitate to contact me.    Sincerely,        Nohemy Araujo RN

## 2024-04-17 NOTE — OP NOTE
[facility]  [facility]    Date: 04/17/2024    Pre-Op Diagnosis:  Bladder lesion    Post-Op Diagnosis:  Bladder lesion    Procedure(s) Performed:   1.  Cystoscopy with bladder biopsy and fulguration    Specimen(s):  Bladder biopsy    Staff Surgeon: Jovi Jackson MD    Assistant Surgeon: none    Anesthesia: General LMA anesthesia    Indications: Yue Serrato is a 54 y.o. female with lesion of urinary bladder noted on office cystoscopy presents today for cysto and bladder biopsy    Findings:  Left-sided posterior bladder wall lesion biopsied and fulgurated    Estimated Blood Loss: min    Drains:  None    Procedure in Detail:  After risks, benefits and possible complications of cystoscopy were explained, the patient elected to undergo the procedure and informed consent was obtained. All questions were answered in the maria r-operative area. The patient was transferred to the cystoscopy suite and placed in the supine position.  SCDs were applied and working.  Anesthesia was administered.  Once the patient was adequately sedated, he was placed in the dorsal lithotomy position and prepped and draped in the usual sterile fashion.  Time out was performed, maria r-procedural antibiotics were confirmed.     A rigid cystoscope in a 22 Fr sheath was introduced into the bladder per urethra. This passed easily.  The entire urethra was visualized which showed no masses or strictures.  The right and left ureteral orifices were identified in the normal anatomic position and were seen effluxing clear urine.  Formal cystoscopy was performed which revealed aforementioned bladder lesion but no other masses or lesions suspicious for malignancy, no trabeculations, no bladder stones and no bladder diverticuli.  Biopsy forceps were inserted through the scope and the lesion was biopsied.  Bugbee was utilized for fulguration and hemostasis was excellent.  The bladder was drained, and the patient was removed from  lithotomy.     The patient tolerated the procedure well and was transferred to recovery in stable condition.    Disposition:  DC home, we will call with biopsy results    Jovi Jackson MD

## 2024-04-18 ENCOUNTER — TELEPHONE (OUTPATIENT)
Dept: REHABILITATION | Facility: HOSPITAL | Age: 54
End: 2024-04-18
Payer: COMMERCIAL

## 2024-04-18 NOTE — TELEPHONE ENCOUNTER
Pt sent a message requesting a visit for today but when we called she stated this week doesn't work but next week is better so scheduling assisted her in getting appt made.

## 2024-04-23 LAB
FINAL PATHOLOGIC DIAGNOSIS: NORMAL
GROSS: NORMAL
Lab: NORMAL

## 2024-04-24 ENCOUNTER — TELEPHONE (OUTPATIENT)
Dept: UROLOGY | Facility: CLINIC | Age: 54
End: 2024-04-24
Payer: COMMERCIAL

## 2024-04-24 NOTE — TELEPHONE ENCOUNTER
Message left for pt to return call     ----- Message from Daryl Ray sent at 4/23/2024  4:28 PM CDT -----  Type:  Patient Returning Call    Who Called:pt  Who Left Message for Patient:  Does the patient know what this is regarding?:results  Would the patient rather a call back or a response via [facility]? Call  Best Call Back Number: [phone]  Additional Information: Pt states she received her blood work results and she's not sure on how to read the. Pt states she would like a call back. Thank you

## 2024-04-25 ENCOUNTER — TELEPHONE (OUTPATIENT)
Dept: UROLOGY | Facility: CLINIC | Age: 54
End: 2024-04-25
Payer: COMMERCIAL

## 2024-04-25 ENCOUNTER — OFFICE VISIT (OUTPATIENT)
Dept: SPORTS MEDICINE | Facility: CLINIC | Age: 54
End: 2024-04-25
Payer: COMMERCIAL

## 2024-04-25 ENCOUNTER — CLINICAL SUPPORT (OUTPATIENT)
Dept: REHABILITATION | Facility: HOSPITAL | Age: 54
End: 2024-04-25
Payer: COMMERCIAL

## 2024-04-25 DIAGNOSIS — R29.898 DECREASED ROM OF NECK: ICD-10-CM

## 2024-04-25 DIAGNOSIS — M25.511 CHRONIC RIGHT SHOULDER PAIN: Primary | ICD-10-CM

## 2024-04-25 DIAGNOSIS — M67.911 TENDINOPATHY OF RIGHT ROTATOR CUFF: ICD-10-CM

## 2024-04-25 DIAGNOSIS — M75.31 CALCIFIC TENDINITIS OF RIGHT SHOULDER: ICD-10-CM

## 2024-04-25 DIAGNOSIS — M25.611 DECREASED RANGE OF MOTION OF RIGHT SHOULDER: Primary | ICD-10-CM

## 2024-04-25 DIAGNOSIS — G89.29 CHRONIC RIGHT SHOULDER PAIN: Primary | ICD-10-CM

## 2024-04-25 DIAGNOSIS — Z74.09 DECREASED FUNCTIONAL MOBILITY AND ENDURANCE: ICD-10-CM

## 2024-04-25 PROCEDURE — 1160F RVW MEDS BY RX/DR IN RCRD: CPT | Mod: CPTII,S$GLB,, | Performed by: STUDENT IN AN ORGANIZED HEALTH CARE EDUCATION/TRAINING PROGRAM

## 2024-04-25 PROCEDURE — 97112 NEUROMUSCULAR REEDUCATION: CPT | Mod: PN

## 2024-04-25 PROCEDURE — 99024 POSTOP FOLLOW-UP VISIT: CPT | Mod: S$GLB,,, | Performed by: STUDENT IN AN ORGANIZED HEALTH CARE EDUCATION/TRAINING PROGRAM

## 2024-04-25 PROCEDURE — 1159F MED LIST DOCD IN RCRD: CPT | Mod: CPTII,S$GLB,, | Performed by: STUDENT IN AN ORGANIZED HEALTH CARE EDUCATION/TRAINING PROGRAM

## 2024-04-25 PROCEDURE — 99999 PR PBB SHADOW E&M-EST. PATIENT-LVL III: CPT | Mod: PBBFAC,,, | Performed by: STUDENT IN AN ORGANIZED HEALTH CARE EDUCATION/TRAINING PROGRAM

## 2024-04-25 PROCEDURE — 97140 MANUAL THERAPY 1/> REGIONS: CPT | Mod: PN

## 2024-04-25 RX ORDER — NABUMETONE 750 MG/1
750 TABLET, FILM COATED ORAL 2 TIMES DAILY
Qty: 60 TABLET | Refills: 1 | Status: SHIPPED | OUTPATIENT
Start: 2024-04-25

## 2024-04-25 NOTE — PROGRESS NOTES
Physical Therapy Daily Treatment Note     Name: Yue Serrato  Clinic Number: [id number]    Therapy Diagnosis:   Encounter Diagnoses   Name Primary?    Decreased range of motion of right shoulder Yes    Decreased ROM of neck     Decreased functional mobility and endurance        Physician: Nilo Brito MD    Visit Date: 4/25/2024    Physician Orders: PT Eval and Treat   Medical Diagnosis from Referral:   M25.511,G89.29 (ICD-10-CM) - Chronic right shoulder pain   M75.31 (ICD-10-CM) - Calcific tendinitis of right shoulder   M67.911 (ICD-10-CM) - Tendinopathy of right rotator cuff      Evaluation Date: 3/25/2024  Authorization Period Expiration: 12/31/24  Plan of Care Expiration: 6/3/24  Visit # / Visits authorized: 4/20  Foto: 1/3     Precautions: Standard, DMII  Surgery 3/14/24    Weeks 1-2     Immobilization: Sling     Lifting restriction: 3-5 lbs (can of soup), no overhead reaching/lifting x2 weeks     Activity/work limitations: No overuse/repetitive activity     Week 3-5     Therapy: Start at the beginning of week 3     Immobilization: none     Return to work/activity: Per guidance of Physical therapist     Time In: 8: 45 am  Time Out: 9:45 am  Total Billable Time: 60 minutes including MH to R shoulder/neck including tech and PT one on one time    SUBJECTIVE     Today, pt reports: she is having some pain but has been working on her HEP as able.  She was compliant with home exercise program.  Response to previous treatment: no new issues  Functional change: feeling a little better with ROM and less stiffness after working on her HEP.    Pre-Treatment Pain: 4/10    Location: R shoulder      OBJECTIVE     R shoulder ROM:  Shoulder flexion  170  Shoulder abduction 165 humeral head hikes a bit and doesn't down glide well  External rotation 60 at 0 but 80 at 90 abduction      TREATMENT     Yue received therapeutic exercises to develop strength, endurance, ROM, and flexibility for 0 minutes  including:      Yue received the following manual therapy techniques: Joint mobilizations, Manual traction, and Soft tissue Mobilization were applied to the: neck and R shoulder for 15 minutes, including:    Shoulder mobs A/P for flexion and inferior glides for abduction/flexion and mobs for increasing superior capsule mobility and inferior mobility; Grade II-III  Soft tissue mobs to infraspinatus, deltoid, subscapularis moderately tight with some multiple trigger points still present    Yue participated in neuromuscular re-education activities to improve: Balance, Kinesthetic, and Proprioception for 45 minutes. The following activities were included:    Seated UBE for posterior scapular stability 5 min backwards only  Seated rows 30# with cues on sternal lift and scapular depression 3x 10  Seated internal rotation with green theraband 3x 10  Seated external rotation with red theraband 3x 10  Prone T 3x 10 R  Prone shoulder extension 3x 10 R  Standing shoulder extension with cables 20 # 3x 10  Side lying R shoulder external rotation at 1#      Deferred:  Supine scapular protraction 1x 10; add 4# 3x 10  Prone scapular depression 2x 10  Prone scapular depression with prone T 2 x8 B with max cues on R  Seated shoulder external rotation with red theraband 3x 10  Bicep curls 4# 3x 10 B with cues on scapular retract/depression  3 rounds of  Seated hand slides on table top for scapular protraction 1x 10  Seated shoulder external rotation at table top 1x 10  Seated shoulder slides R to L abduction 1 x10  Seated shoulder flexion (arm in supination) 1x 10        Yue participated in dynamic functional therapeutic activities to improve functional performance for 0  minutes, including:      Yue participated in gait training to improve functional mobility and safety for 0  minutes, including:      Yue received the following direct contact modalities after being cleared for contraindications:     Yue  received the following supervised modalities after being cleared for contradictions:     Yue received hot pack for R shoulder 10 minutes to R shoulder and neck.    Yue received cold pack for 0 minutes to 0.  Yue received electrical stimulation for 0 minutes to 0      Home Exercises Provided and Patient Education Provided     Education/Self-Care provided: (during therex)    Patient educated on the importance of improved core and upper and lower extremity strength in order to improve alignment of the spine and upper and lower extremities with static positions and dynamic movement.   Patient educated on the importance of strong core and lower extremity musculature in order to improve both static and dynamic balance, improve gait mechanics, reduce fall risk and improve household and community mobility.       Written Home Exercises Provided: Patient instructed to cont prior HEP.  Exercises were reviewed and Yue was able to demonstrate them prior to the end of the session.  Yue demonstrated good  understanding of the education provided.     See EMR under Patient Instructions for exercises provided 4/1/2024.    ASSESSMENT   Pt tolerated therex and manual therapy still R shoulder hikes with abduction and is limited with ER. PT added more manual and upgraded HEP. Pt has been unable to attend consistently limiting some progress. . Pt demonstrated good understanding of exercises and required moderate cueing to maintain proper form.      Yue Is progressing well towards her goals.   Pt prognosis is Good.     Pt will continue to benefit from skilled outpatient physical therapy to address the deficits listed in the problem list box on initial evaluation, provide pt/family education and to maximize pt's level of independence in the home and community environment.     Pt's spiritual, cultural and educational needs considered and pt agreeable to plan of care and goals.     Anticipated barriers to physical  therapy: none    Goals:   Short Term Goals: In 4 weeks   1.Pt to be educated on HEP. progressing  2.Patient to increase GH ROM to 150 degrees flexion or better. met  3.Patient to increase strength to 3+/5 with external rotation or better. progressing  4.Patient to have pain less than 5/10 or better at all times.  5.Patient to improve score on the FOTO by 10%.  6. Pt to be educated on postural and body mechanics awareness.     Long Term Goals: In 10 weeks  1. Patient to improve score on the FOTO to 66.  2. Patient to demo increase in UE strength to 4/5 with flexion and rotation  3. Patient to have decreased pain to 2/10 at all times.  4. Patient to demo increase GH ROM to 65 degrees or better with Internal rotation and reaching behind back to T9 or better  5. Patient to perform daily activities including dressing without help, lifting up to 5 # overhead and carrying up to 20 # or better in the R UE.        Plan   Plan of care Certification: 3/25/2024 to 6/3/24.     Outpatient Physical Therapy 2 times weekly for 10 weeks to include the following interventions: Cervical/Lumbar Traction, Electrical Stimulation IFC, NMES, Manual Therapy, Moist Heat/ Ice, Neuromuscular Re-ed, Patient Education, Self Care, Therapeutic Activities, Therapeutic Exercise, and DN.      Continue Plan of Care (POC) and progress per patient tolerance.    Nithya Hummel, PT, CIDN, MA

## 2024-04-25 NOTE — PATIENT INSTRUCTIONS
Assessment:  Yue Serrato is a 54 y.o. female with a chief complaint of Pain of the Right Shoulder    Encounter Diagnoses   Name Primary?    Chronic right shoulder pain Yes    Calcific tendinitis of right shoulder     Tendinopathy of right rotator cuff       Plan:  Patient now 6 weeks out from right shoulder TenJet for calcific tendinopathy.  Still with pain, weakness.  Has been doing home exercises, has been doing as much formal therapy recently given other health issues and concerns.    Would recommend resuming formal therapy, really need to focus on rotator cuff strengthening at this time.  We have given your shoulder time to calm down after the procedure, and now we want to focus on strengthening of the cuff in the tendons.    We will trial anti-inflammatory, Relafen 750 mg, twice daily for pain and discomfort.  Patient has previously tried and failed ibuprofen, naproxen, oral diclofenac.      Follow-up:  6 weeks or sooner if there are any problems between now and then.    Thank you for choosing [facility] and Dr. Nilo Brito for your orthopedic & sports medicine care. It is our goal to provide you with exceptional care that will help keep you healthy, active, and get you back in the game.    Please do not hesitate to reach out to us via email, phone, or MyChart with any questions, concerns, or feedback.    If you are experiencing pain/discomfort ,or have questions after 5pm and would like to be connected to the [facility] on-call team, please call this number and specify which Sports Medicine provider is treating you: [phone]

## 2024-04-25 NOTE — PROGRESS NOTES
Patient ID: Yue Serrato  YOB: 1970  MRN: [id number]    Chief Complaint: Pain of the Right Shoulder    Referred By: Self    Occupation:       History of Present Illness: Yue Serrato is a right-hand dominant 54 y.o. female with type II DM who presents today with Pain of the Right Shoulder    She is 6 wks S/P Right shoulder Tenjet procedure for calcific tendinopathy (3/14/24).  She reports that she is doing overall well.  She has been limited in ability to attend PT while she is being worked up for an issue with her bladder under the care of Dr. Jackson.  She is still weak, stiff and has pain in the shoulder but it is gradually improving.    Previous HPI 24:  She complains of right shoulder pain that began around 2023 when she was moving a dresser in her bedroom.  She had shoulder pain but didn't think much of it.  Her pain worsened with time.  She has pain with reaching behind her back, into the back of her car and occasionally with overhead reaching.  The pain is located in her lateral shoulder and radiates down her upper arm.  No formal treatment to date.  No radicular pain or mechanical symptoms.  No neck pain.  She is diabetic with a recent A1C of 10.    Past Medical History:   Past Medical History:   Diagnosis Date    Anxiety     Depression     Diabetes mellitus type II     Diverticulitis     History of abnormal cervical Pap smear     HSV infection     Hyperlipidemia     Hypertension     Liver disease     fatty liver     Past Surgical History:   Procedure Laterality Date     SECTION      x3    COLONOSCOPY  2019    normal    CYSTOSCOPY WITH BIOPSY OF BLADDER N/A 2024    Procedure: CYSTOSCOPY, WITH BLADDER BIOPSY, WITH FULGURATION IF INDICATED;  Surgeon: Jovi Jackson MD;  Location: [facility];  Service: Urology;  Laterality: N/A;    ESOPHAGOGASTRODUODENOSCOPY N/A 2021    Procedure: EGD (ESOPHAGOGASTRODUODENOSCOPY);  Surgeon:  Prerna Poole MD;  Location: [facility] ENDO;  Service: Endoscopy;  Laterality: N/A;    ESOPHAGOGASTRODUODENOSCOPY N/A 03/20/2023    Procedure: EGD (ESOPHAGOGASTRODUODENOSCOPY);  Surgeon: Hector Reyes MD;  Location: [facility] ENDO;  Service: Endoscopy;  Laterality: N/A;    LIPOMA RESECTION      upper back    TOTAL ABDOMINAL HYSTERECTOMY W/ BILATERAL SALPINGOOPHORECTOMY Bilateral 11/2005    dyplasia     Family History   Problem Relation Name Age of Onset    Heart disease Mother          CAD    Diabetes Mother      Hypertension Mother      Hyperlipidemia Mother      COPD Mother      COPD Father      Diabetes Father      Hyperlipidemia Father      Hypertension Father      Heart disease Father          MI    Colon cancer Neg Hx       Social History     Socioeconomic History    Marital status:     Number of children: 3   Occupational History     Employer: [employer]    Tobacco Use    Smoking status: Never     Passive exposure: Never    Smokeless tobacco: Never   Substance and Sexual Activity    Alcohol use: Yes     Comment: occasionally    Drug use: No    Sexual activity: Yes     Partners: Male     Birth control/protection: See Surgical Hx     Comment: NIGEL/BSO     Medication List with Changes/Refills   New Medications    NABUMETONE (RELAFEN) 750 MG TABLET    Take 1 tablet (750 mg total) by mouth 2 (two) times daily.   Current Medications    ACETAMINOPHEN (TYLENOL) 500 MG TABLET    Tylenol Take @0700 No date recorded No form recorded No frequency recorded No route recorded No set duration recorded No set duration amount recorded suspended No dosage strength recorded No dosage strength units of measure recorded    ATORVASTATIN (LIPITOR) 10 MG TABLET    Take 10 mg by mouth once daily.    ATORVASTATIN (LIPITOR) 40 MG TABLET    Take 40 mg by mouth.    BLOOD SUGAR DIAGNOSTIC (ACCU-CHEK SMARTVIEW TEST STRIP) STRP    Check BS fastin and 2 hrs after biggest meal    CLONAZEPAM (KLONOPIN) 0.5 MG TABLET    Take 0.5 mg  "by mouth 2 (two) times daily as needed for Anxiety.    DIAZEPAM (VALIUM) 5 MG TABLET    Take one tab 30 mins prior to procedure    ESTRADIOL (VAGIFEM) 10 MCG TAB    Place 1 tablet (10 mcg total) vaginally twice a week.    FENOFIBRATE (TRICOR) 48 MG TABLET    1 tablet Orally Once a day for 30 day(s)    FENOFIBRATE 160 MG TAB    Take 160 mg by mouth once daily.    FLUTICASONE PROPIONATE (FLONASE) 50 MCG/ACTUATION NASAL SPRAY    by Each Nostril route.    HYDROCODONE-ACETAMINOPHEN (NORCO) 5-325 MG PER TABLET    Take 1 tablet by mouth every 6 to 8 hours as needed for Pain.    LANCETS (ACCU-CHEK SOFTCLIX LANCETS) MISC    Check BS fastin and 2 hrs after biggest meal    NYSTATIN (MYCOSTATIN) CREAM    Apply topically 2 (two) times daily. for 7 days    ONETOUCH DELICA PLUS LANCET 33 GAUGE MISC    Apply topically.    ONETOUCH VERIO FLEX METER MISC    use as directed    OZEMPIC 1 MG/DOSE (4 MG/3 ML)    Inject 2 mg into the skin every 7 days.    PANTOPRAZOLE (PROTONIX) 40 MG TABLET    Take 1 tablet (40 mg total) by mouth once daily.    PHENAZOPYRIDINE (PYRIDIUM) 100 MG TABLET    Take 1 tablet (100 mg total) by mouth 3 (three) times daily as needed.    TRESIBA FLEXTOUCH U-200 200 UNIT/ML (3 ML) INPN    INJECT 100 UNITS SUBCUTANEOUSLY ONCE DAILY    TRINTELLIX 10 MG TAB    Take 1 tablet by mouth.    VALACYCLOVIR (VALTREX) 1000 MG TABLET    Take 1,000 mg by mouth 3 (three) times daily.     Review of patient's allergies indicates:   Allergen Reactions    Morphine Other (See Comments)     Reports "headache"  Other reaction(s): HEADACHE       Physical Exam:   There is no height or weight on file to calculate BMI.    Physical Exam  Detailed MSK exam:     Right Shoulder:  Inspection: No swelling, erythema or ecchymosis.   Palpation: Tenderness to palpation Lateral Subacromial space  Range of motion: 155 deg Flexion         55 deg External Rotation in Adduction         IR to Lumbar  Strength:  5-/5 Abduction    5-/5 External Rotation in " Adduction    5/5 Internal Rotation   Special Tests: Positive Coronado-Grady    Positive Neer's    Negative Speed's    Equivocal Cowlitz's  Negative Empty Can  Negative Spurling's   N/V Exam:  Radial: Normal motor (EPL/thumbs up)              Normal sensory (dorsal hand)   Median: Normal motor (FPL/A-OK)      Normal sensory (thumb)   Ulnar:  Normal motor (Interossei/scissors-spread)     Normal sensory (5th finger)   LABC: Normal sensory (lateral forearm)   MABC: Normal sensory (medial forearm)   MC: Normal motor (elbow flexion)   Axillary: Normal motor/sensory (deltoid)  Normal radial and ulnar pulses, warm and well perfused with capillary refill < 2 sec       Imaging:    No new imaging today.      Patient Instructions   Assessment:  Yue Serrato is a 54 y.o. female with a chief complaint of Pain of the Right Shoulder    Encounter Diagnoses   Name Primary?    Chronic right shoulder pain Yes    Calcific tendinitis of right shoulder     Tendinopathy of right rotator cuff       Plan:  Patient now 6 weeks out from right shoulder TenJet for calcific tendinopathy.  Still with pain, weakness.  Has been doing home exercises, has been doing as much formal therapy recently given other health issues and concerns.    Would recommend resuming formal therapy, really need to focus on rotator cuff strengthening at this time.  We have given your shoulder time to calm down after the procedure, and now we want to focus on strengthening of the cuff in the tendons.    We will trial anti-inflammatory, Relafen 750 mg, twice daily for pain and discomfort.  Patient has previously tried and failed ibuprofen, naproxen, oral diclofenac.      Follow-up:  6 weeks or sooner if there are any problems between now and then.    Thank you for choosing [facility] and Dr. Nilo Brito for your orthopedic & sports medicine care. It is our goal to provide you with exceptional care that will help keep you healthy, active, and  get you back in the game.    Please do not hesitate to reach out to us via email, phone, or MyChart with any questions, concerns, or feedback.    If you are experiencing pain/discomfort ,or have questions after 5pm and would like to be connected to the [facility] on-call team, please call this number and specify which Sports Medicine provider is treating you: [phone]       A copy of today's visit note has been sent to the referring provider.           Nilo Brito MD  Primary Care Sports Medicine    Disclaimer: This note was prepared using a voice recognition system and is likely to have sound alike errors within the text.

## 2024-04-25 NOTE — TELEPHONE ENCOUNTER
Sent a message to Dr Jackson to review results       ----- Message from Dajuan Reddy sent at 4/25/2024 11:28 AM CDT -----  Regarding: Path results  Hey, we have Yue downstairs in Ortho for a follow up appointment, she mentioned that she's waiting to hear back from y'all about her pathology results.  She didn't receive the VM that y'all left yesterday.    She's going to come upstairs once we're done to see if anyone can discuss with her.  She also uses [employer], so if appropriate you can communicate with her that way.  I'll send Dr. Jackson a message as well.    Dajuan Reddy  Lead Sports Medicine Assistant  [facility] [place]

## 2024-04-30 ENCOUNTER — OFFICE VISIT (OUTPATIENT)
Dept: UROLOGY | Facility: CLINIC | Age: 54
End: 2024-04-30
Payer: COMMERCIAL

## 2024-04-30 ENCOUNTER — CLINICAL SUPPORT (OUTPATIENT)
Dept: REHABILITATION | Facility: HOSPITAL | Age: 54
End: 2024-04-30
Payer: COMMERCIAL

## 2024-04-30 VITALS
RESPIRATION RATE: 16 BRPM | BODY MASS INDEX: 30.14 KG/M2 | WEIGHT: 163.81 LBS | SYSTOLIC BLOOD PRESSURE: 149 MMHG | HEART RATE: 93 BPM | HEIGHT: 62 IN | DIASTOLIC BLOOD PRESSURE: 84 MMHG

## 2024-04-30 DIAGNOSIS — N30.00 ACUTE CYSTITIS WITHOUT HEMATURIA: Primary | ICD-10-CM

## 2024-04-30 DIAGNOSIS — R31.21 ASYMPTOMATIC MICROSCOPIC HEMATURIA: ICD-10-CM

## 2024-04-30 DIAGNOSIS — R29.898 DECREASED ROM OF NECK: ICD-10-CM

## 2024-04-30 DIAGNOSIS — Z74.09 DECREASED FUNCTIONAL MOBILITY AND ENDURANCE: ICD-10-CM

## 2024-04-30 DIAGNOSIS — M25.611 DECREASED RANGE OF MOTION OF RIGHT SHOULDER: Primary | ICD-10-CM

## 2024-04-30 LAB
BILIRUB UR QL STRIP: NEGATIVE
GLUCOSE UR QL STRIP: POSITIVE
KETONES UR QL STRIP: NEGATIVE
LEUKOCYTE ESTERASE UR QL STRIP: POSITIVE
PH, POC UA: 6
POC BLOOD, URINE: POSITIVE
POC NITRATES, URINE: NEGATIVE
PROT UR QL STRIP: NEGATIVE
SP GR UR STRIP: >=1.03 (ref 1–1.03)
UROBILINOGEN UR STRIP-ACNC: 0.2 (ref 0.1–1.1)

## 2024-04-30 PROCEDURE — 81003 URINALYSIS AUTO W/O SCOPE: CPT | Mod: QW,S$GLB,, | Performed by: UROLOGY

## 2024-04-30 PROCEDURE — 3008F BODY MASS INDEX DOCD: CPT | Mod: CPTII,S$GLB,, | Performed by: UROLOGY

## 2024-04-30 PROCEDURE — 97140 MANUAL THERAPY 1/> REGIONS: CPT | Mod: PN

## 2024-04-30 PROCEDURE — 99999 PR PBB SHADOW E&M-EST. PATIENT-LVL V: CPT | Mod: PBBFAC,,, | Performed by: UROLOGY

## 2024-04-30 PROCEDURE — 1160F RVW MEDS BY RX/DR IN RCRD: CPT | Mod: CPTII,S$GLB,, | Performed by: UROLOGY

## 2024-04-30 PROCEDURE — 87077 CULTURE AEROBIC IDENTIFY: CPT | Performed by: UROLOGY

## 2024-04-30 PROCEDURE — 87086 URINE CULTURE/COLONY COUNT: CPT | Performed by: UROLOGY

## 2024-04-30 PROCEDURE — 99214 OFFICE O/P EST MOD 30 MIN: CPT | Mod: S$GLB,,, | Performed by: UROLOGY

## 2024-04-30 PROCEDURE — 87186 SC STD MICRODIL/AGAR DIL: CPT | Performed by: UROLOGY

## 2024-04-30 PROCEDURE — 97112 NEUROMUSCULAR REEDUCATION: CPT | Mod: PN

## 2024-04-30 PROCEDURE — 3079F DIAST BP 80-89 MM HG: CPT | Mod: CPTII,S$GLB,, | Performed by: UROLOGY

## 2024-04-30 PROCEDURE — 87088 URINE BACTERIA CULTURE: CPT | Performed by: UROLOGY

## 2024-04-30 PROCEDURE — 1159F MED LIST DOCD IN RCRD: CPT | Mod: CPTII,S$GLB,, | Performed by: UROLOGY

## 2024-04-30 PROCEDURE — 3077F SYST BP >= 140 MM HG: CPT | Mod: CPTII,S$GLB,, | Performed by: UROLOGY

## 2024-04-30 RX ORDER — METHENAMINE HIPPURATE 1000 MG/1
1 TABLET ORAL 2 TIMES DAILY
Qty: 60 TABLET | Refills: 11 | Status: SHIPPED | OUTPATIENT
Start: 2024-04-30

## 2024-04-30 RX ORDER — AMOXICILLIN AND CLAVULANATE POTASSIUM 875; 125 MG/1; MG/1
1 TABLET, FILM COATED ORAL EVERY 12 HOURS
Qty: 10 TABLET | Refills: 0 | Status: SHIPPED | OUTPATIENT
Start: 2024-04-30 | End: 2024-05-05

## 2024-04-30 NOTE — PROGRESS NOTES
Physical Therapy Daily Treatment Note and Progress Note     Name: Yue Serrato  Clinic Number: [id number]    Therapy Diagnosis:   Encounter Diagnoses   Name Primary?    Decreased range of motion of right shoulder Yes    Decreased ROM of neck     Decreased functional mobility and endurance      Physician: Nilo Brito MD    Visit Date: 4/30/2024    Physician Orders: PT Eval and Treat   Medical Diagnosis from Referral:   M25.511,G89.29 (ICD-10-CM) - Chronic right shoulder pain   M75.31 (ICD-10-CM) - Calcific tendinitis of right shoulder   M67.911 (ICD-10-CM) - Tendinopathy of right rotator cuff      Evaluation Date: 3/25/2024  Authorization Period Expiration: 12/31/24  Plan of Care Expiration: 6/3/24  Visit # / Visits authorized: 5/20 including eval  Foto: 2/3 (53)   Progress Note due 30 days from 4/30/24  Precautions: Standard, DMII  Surgery 3/14/24    Weeks 1-2     Immobilization: Sling     Lifting restriction: 3-5 lbs (can of soup), no overhead reaching/lifting x2 weeks     Activity/work limitations: No overuse/repetitive activity     Week 3-5     Therapy: Start at the beginning of week 3     Immobilization: none     Return to work/activity: Per guidance of Physical therapist     Time In: 9:00 am  Time Out: 9:58 am  Total Billable Time: 58 minutes including tech and PT one on one time    SUBJECTIVE     Today, pt reports: she is has been working on neck and ER of the R shoulder  She was compliant with home exercise program.  Response to previous treatment: no new issues  Functional change: feeling a little better with ROM and less stiffness after working on her HEP.    Pre-Treatment Pain: 2/10    Location: R shoulder      OBJECTIVE     Shoulder ROM at eval; today Right     Active Joint Range Right Left   Flexion 90;170 160   ABDuction 70; 165 175   External Rotation 40; 60 at 0 and 80 at 90 abduction 65 at 0 and 70 at 90 abduction   Internal Rotation 20; 40 Combined T10; 55   Adduction 0;not assessed  today 30   Extension 15;not assessed today 60      Pain with motions of internal rotation, flexion and abduction and abduction.     Cervical Spine AROM at eval;today    Degrees Pain   Flexion 45; 60 n   Extend 45;55 n   R side bend 20;40 n   L side bend 35;40 n   R rotation 55;70 n   L rotation 50;65 n      Strength at eval; today  Muscle (Myotome) Right Left   Cervical Deep neck Flexor hold time 12 sec/32 is normal     Shoulder Abduction 2;4 4   Elbow Flexors (C5) 3+;4+ 4   Wrist Extensors (C6) 5;5 5   Elbow Extensors (C7) 4;4+ 5   ER (arm at side) 2+;3+ 5   IR (arm at side) 2+;4 5   Thumb extensors 5;5 5   Intrinsic strength good        TREATMENT     Yue received therapeutic exercises to develop strength, endurance, ROM, and flexibility for 0 minutes including:      Yue received the following manual therapy techniques: Joint mobilizations, Manual traction, and Soft tissue Mobilization were applied to the: neck and R shoulder for 15 minutes, including:    Shoulder mobs A/P for external rotation, EROM flexion and internal rotatino grade II-III  Soft tissue mobs to pect minor, bicep and deltoid      Yue participated in neuromuscular re-education activities to improve: Balance, Kinesthetic, and Proprioception for 43 minutes. The following activities were included:    Seated UBE for posterior scapular stability 5 min backwards only  Seated rows 30# with cues on sternal lift and scapular depression 4x 10  Seated med c  C spine machine 102# (2 inch seat) 3x 3 min  After manual therapy on R shoulder:  Shoulder posterior humeral activation in supine like humeral head trying to touch table (scapular posterior tipping) 2x 10  Scapular posterior tipping with shoulder external rotation in supine 3x 10  Scapular posterior tipping with loading internal rotators 2# 2x 20 sec hold  Prone T 3x 10 R with cues on posterior scapular tipping  Prone shoulder extension 3x 10 R 1 #  Seated external rotation with red theraband  3x 10      Deferred:  Standing shoulder extension with cables 20 # 3x 10  Side lying R shoulder external rotation at 1#  Seated internal rotation with green theraband 3x 10Supine scapular protraction 1x 10; add 4# 3x 10  Prone scapular depression 2x 10  Prone scapular depression with prone T 2 x8 B with max cues on R  Seated shoulder external rotation with red theraband 3x 10  Bicep curls 4# 3x 10 B with cues on scapular retract/depression  3 rounds of  Seated hand slides on table top for scapular protraction 1x 10  Seated shoulder external rotation at table top 1x 10  Seated shoulder slides R to L abduction 1 x10  Seated shoulder flexion (arm in supination) 1x 10        Yue participated in dynamic functional therapeutic activities to improve functional performance for 0  minutes, including:      Yue participated in gait training to improve functional mobility and safety for 0  minutes, including:      Yue received the following direct contact modalities after being cleared for contraindications:     Yue received the following supervised modalities after being cleared for contradictions:     Yue received hot pack for R shoulder 10 minutes to R shoulder and neck.    Yue received cold pack for 0 minutes to 0.  Yue received electrical stimulation for 0 minutes to 0      Home Exercises Provided and Patient Education Provided     Education/Self-Care provided: (during therex)    Patient educated on the importance of improved core and upper and lower extremity strength in order to improve alignment of the spine and upper and lower extremities with static positions and dynamic movement.   Patient educated on the importance of strong core and lower extremity musculature in order to improve both static and dynamic balance, improve gait mechanics, reduce fall risk and improve household and community mobility.       Written Home Exercises Provided: Patient instructed to cont prior HEP.  Exercises  were reviewed and Yue was able to demonstrate them prior to the end of the session.  Yue demonstrated good  understanding of the education provided.     See EMR under Patient Instructions for exercises provided 4/1/2024.    ASSESSMENT   Pt tolerated therex and manual therapy with pt making gains in all areas of mobility at the neck and shoulder. Pt needed intermittent cues to reduce R shoulder hikes with T and rows but this is improved in function with flexion and abduction today.     with abduction and is limited with ER. PT added more manual and upgraded HEP. Pt has been unable to attend consistently limiting some progress. . Pt demonstrated good understanding of exercises and required moderate cueing to maintain proper form.      Yue Is progressing well towards her goals.   Pt prognosis is Good.     Pt will continue to benefit from skilled outpatient physical therapy to address the deficits listed in the problem list box on initial evaluation, provide pt/family education and to maximize pt's level of independence in the home and community environment.     Pt's spiritual, cultural and educational needs considered and pt agreeable to plan of care and goals.     Anticipated barriers to physical therapy: none    Goals:   Short Term Goals: In 4 weeks   1.Pt to be educated on HEP. met  2.Patient to increase GH ROM to 150 degrees flexion or better. met  3.Patient to increase strength to 3+/5 with external rotation or better. progressing  4.Patient to have pain less than 5/10 or better at all times. progressing  5.Patient to improve score on the FOTO by 10%. met  6. Pt to be educated on postural and body mechanics awareness. progressing     Long Term Goals: In 10 weeks  1. Patient to improve score on the FOTO to 66.  2. Patient to demo increase in UE strength to 4/5 with flexion and rotation  3. Patient to have decreased pain to 2/10 at all times.  4. Patient to demo increase GH ROM to 65 degrees or better  with Internal rotation and reaching behind back to T9 or better  5. Patient to perform daily activities including dressing without help, lifting up to 5 # overhead and carrying up to 20 # or better in the R UE.        Plan   Plan of care Certification: 3/25/2024 to 6/3/24.     Outpatient Physical Therapy 2 times weekly for 10 weeks to include the following interventions: Cervical/Lumbar Traction, Electrical Stimulation IFC, NMES, Manual Therapy, Moist Heat/ Ice, Neuromuscular Re-ed, Patient Education, Self Care, Therapeutic Activities, Therapeutic Exercise, and DN.      Continue Plan of Care (POC) and progress per patient tolerance.    Nithya Hummel, PT, CIDN, SFMA

## 2024-04-30 NOTE — PROGRESS NOTES
Chief Complaint:  Recurrent UTIs, microscopic hematuria    HPI:   2024 - returns today for follow-up and review of her pathology, this shows no cancer from her biopsy, patient notes worsening issues with dysuria and frequency, urine shows blood leukocytes and +glucose    2024 - patient returns today for cystoscopy, no recent UTIs    2024 - 54 yo female that presents for evaluation of recurrent UTIs and microscopic hematuria.  Patient notes that she has had UTIs all my life but more recently over the last 2-3 months she has had issues with recurrent infections.  Notes that it takes towards the end of the antibiotics cycle for her symptoms to improve, and then her symptoms will improve for 5-7 days and then will return.  Patient notes dysuria, urgency, and suprapubic discomfort.  Patient notes history of poorly-controlled diabetes, A1c was 12 and then improved to 8.5.  She has not currently sexually active, notes that the last time she had intercourse, she had discomfort and bleeding.  She denies any gross hematuria.  Denies family history of  cancers.      PMH:  Past Medical History:   Diagnosis Date    Anxiety     Depression     Diabetes mellitus type II     Diverticulitis 2012    History of abnormal cervical Pap smear     HSV infection     Hyperlipidemia     Hypertension     Liver disease     fatty liver       PSH:  Past Surgical History:   Procedure Laterality Date     SECTION      x3    COLONOSCOPY  2019    normal    CYSTOSCOPY WITH BIOPSY OF BLADDER N/A 2024    Procedure: CYSTOSCOPY, WITH BLADDER BIOPSY, WITH FULGURATION IF INDICATED;  Surgeon: Jovi Jackson MD;  Location: [facility];  Service: Urology;  Laterality: N/A;    ESOPHAGOGASTRODUODENOSCOPY N/A 2021    Procedure: EGD (ESOPHAGOGASTRODUODENOSCOPY);  Surgeon: Prerna Poole MD;  Location: [facility];  Service: Endoscopy;  Laterality: N/A;    ESOPHAGOGASTRODUODENOSCOPY N/A 2023    Procedure: EGD  (ESOPHAGOGASTRODUODENOSCOPY);  Surgeon: Hector Reyes MD;  Location: [facility];  Service: Endoscopy;  Laterality: N/A;    LIPOMA RESECTION      upper back    TOTAL ABDOMINAL HYSTERECTOMY W/ BILATERAL SALPINGOOPHORECTOMY Bilateral 11/2005    dyplasia       Family History:  Family History   Problem Relation Name Age of Onset    Heart disease Mother          CAD    Diabetes Mother      Hypertension Mother      Hyperlipidemia Mother      COPD Mother      COPD Father      Diabetes Father      Hyperlipidemia Father      Hypertension Father      Heart disease Father          MI    Colon cancer Neg Hx         Social History:  Social History     Tobacco Use    Smoking status: Never     Passive exposure: Never    Smokeless tobacco: Never   Substance Use Topics    Alcohol use: Yes     Comment: occasionally    Drug use: No        Review of Systems:  General: No fever, chills  Skin: No rashes  Chest:  Denies cough and sputum production  Heart: Denies chest pain  Resp: Denies dyspnea  Abdomen: Denies diarrhea, abdominal pain, hematemesis, or blood in stool.  Musculoskeletal: No joint stiffness or swelling. Denies back pain.  : see HPI  Neuro: no dizziness or weakness    Allergies:  Morphine    Medications:    Current Outpatient Medications:     acetaminophen (TYLENOL) 500 MG tablet, Tylenol Take @0700 No date recorded No form recorded No frequency recorded No route recorded No set duration recorded No set duration amount recorded suspended No dosage strength recorded No dosage strength units of measure recorded, Disp: , Rfl:     atorvastatin (LIPITOR) 10 MG tablet, Take 10 mg by mouth once daily., Disp: , Rfl:     atorvastatin (LIPITOR) 40 MG tablet, Take 40 mg by mouth., Disp: , Rfl:     blood sugar diagnostic (ACCU-CHEK SMARTVIEW TEST STRIP) Strp, Check BS fastin and 2 hrs after biggest meal, Disp: 60 strip, Rfl: 0    clonazePAM (KLONOPIN) 0.5 MG tablet, Take 0.5 mg by mouth 2 (two) times daily as needed for Anxiety.,  Disp: , Rfl:     diazePAM (VALIUM) 5 MG tablet, Take one tab 30 mins prior to procedure, Disp: 1 tablet, Rfl: 0    fenofibrate (TRICOR) 48 MG tablet, 1 tablet Orally Once a day for 30 day(s), Disp: , Rfl:     fenofibrate 160 MG Tab, Take 160 mg by mouth once daily., Disp: , Rfl:     fluticasone propionate (FLONASE) 50 mcg/actuation nasal spray, by Each Nostril route., Disp: , Rfl:     lancets (ACCU-CHEK SOFTCLIX LANCETS) Misc, Check BS fastin and 2 hrs after biggest meal, Disp: 60 each, Rfl: 0    nabumetone (RELAFEN) 750 MG tablet, Take 1 tablet (750 mg total) by mouth 2 (two) times daily., Disp: 60 tablet, Rfl: 1    ONETOUCH DELICA PLUS LANCET 33 gauge Misc, Apply topically., Disp: , Rfl:     ONETOUCH VERIO FLEX METER Misc, use as directed, Disp: , Rfl:     OZEMPIC 1 mg/dose (4 mg/3 mL), Inject 2 mg into the skin every 7 days., Disp: , Rfl:     pantoprazole (PROTONIX) 40 MG tablet, Take 1 tablet (40 mg total) by mouth once daily., Disp: 30 tablet, Rfl: 3    TRESIBA FLEXTOUCH U-200 200 unit/mL (3 mL) InPn, INJECT 100 UNITS SUBCUTANEOUSLY ONCE DAILY, Disp: , Rfl: 5    TRINTELLIX 10 mg Tab, Take 1 tablet by mouth., Disp: , Rfl:     valACYclovir (VALTREX) 1000 MG tablet, Take 1,000 mg by mouth 3 (three) times daily., Disp: , Rfl:     amoxicillin-clavulanate 875-125mg (AUGMENTIN) 875-125 mg per tablet, Take 1 tablet by mouth every 12 (twelve) hours. for 5 days, Disp: 10 tablet, Rfl: 0    estradioL (VAGIFEM) 10 mcg Tab, Place 1 tablet (10 mcg total) vaginally twice a week. (Patient not taking: Reported on 2/14/2023), Disp: 8 tablet, Rfl: 11    HYDROcodone-acetaminophen (NORCO) 5-325 mg per tablet, Take 1 tablet by mouth every 6 to 8 hours as needed for Pain. (Patient not taking: Reported on 4/25/2024), Disp: 28 tablet, Rfl: 0    methenamine (HIPREX) 1 gram Tab, Take 1 tablet (1 g total) by mouth 2 (two) times daily., Disp: 60 tablet, Rfl: 11    nystatin (MYCOSTATIN) cream, Apply topically 2 (two) times daily. for 7 days,  Disp: 30 g, Rfl: 0    Physical Exam:  Vitals:    04/30/24 1556   BP: (!) 149/84   Pulse: 93   Resp: 16     Body mass index is 29.96 kg/m².  General: awake, alert, cooperative  Head: NC/AT  Ears: external ears normal  Eyes: sclera normal  Lungs: normal inspiration, NAD  Heart: well-perfused  Skin: The skin is warm and dry  Ext: No c/c/e.  Neuro: grossly intact, AOx3    RADIOLOGY:  US RETROPERITONEAL KIDNEY AND BLADDER     Indication: [facility] Pacs merged reason for exam: R30.0:Dysuria R31.9:Hematuria, unspecified     Additional information:     Comparison: September 27, 2022 ultrasound abdomen complete     RENAL LENGTH   11.3 x 4.9 x 4.3 cm in length on the right   11.4 x 5.1 x 4.9 cm in length on the left.     RENAL VOLUME   Right: 1/25/2021 ml   Left:   150.1 ml     Cortical echotexture is normal.   No solid mass, shadowing calculus, or hydronephrosis is seen.     Note:     In adults, the normal kidney is 10-14 cm long in males and 9-13 cm long in females, 3-5 cm wide.     Normal volume 110 to 190 ml in men and 90 to 150 ml in women. Renal volume can be estimated by multiply length times width times height divided by 2.     Imaging performed of the bladder. The bladder is incompletely distended at time of imaging with a prevoiding volume of 98.2 cc. No suspicious bladder mass identified on submitted images.     Note made of echogenic appearance of the liver suggesting hepatic steatosis.     IMPRESSION:     No acute or focal suspicious abnormality of the kidneys.   Note made of echogenic appearance of the liver compatible hepatic steatosis   The bladder is partially distended at the time of imaging with no obvious focal abnormality identified.     LABS:  I personally reviewed the following lab values:  Lab Results   Component Value Date    WBC 7.47 04/02/2024    HGB 13.9 04/02/2024    HCT 41.8 04/02/2024     04/02/2024     04/02/2024    K 4.0 04/02/2024      04/02/2024    CREATININE 0.9 04/02/2024    BUN 13 04/02/2024    CO2 24 04/02/2024    TSH 1.758 09/10/2013    INR 0.9 06/14/2018    HGBA1C 9.9 (H) 12/12/2013    CHOL 223 (H) 12/12/2013    TRIG 232 (H) 12/12/2013    HDL 48 12/12/2013    ALT 16 04/02/2024    AST 14 04/02/2024     URINALYSIS:  Urinalysis obtained in clinic today specific gravity 1.025 pH 5.5 trace intact blood small LE, negative for all other parameters    Procedure:  Diagnostic Cystoscopy    Indications:  Hematuria    UA: normal, see lab results for values    Procedure in Detail: After proper consents were obtained, the patient was prepped and draped in normal sterile fashion for diagnostic cystoscopy. 5 ml of lidocaine jelly was instilled in the urethra. The flexible cystoscope was then introduced into the urethra, and advanced into the bladder under direct vision. The urethral mucosa appeared normal, and no strictures were noted. The sphincter was normal. The bladder neck was normal. Inspection of the interior of the bladder was then carried out. The trigone was unremarkable, with no mucosal lesions. The ureteral orifices were normal in position and configuration. Systematic inspection of the mucosa of the bladder it was then carried out, rotating the cystoscope so that all areas of the left and right lateral walls, the dome of the bladder, and the posterior wall were all visualized.  Along the left posterior aspect of the bladder, erythematous tissue was noted.  The cystoscope was then advanced further into the bladder, and maximum deflection of the scope was performed so that the bladder neck could be inspected. No mucosal lesions were noted there. The cystoscope was then removed, and the procedure terminated. Patient tolerated the procedure well. No complications.     Findings:  Erythematous tissue noted along the left posterior aspect of the bladder      Assessment/Plan:   Yue Serrato is a 54 y.o. female with:    Bladder lesion - negative  biopsy    Recurrent UTIs - start hiprex, augmentin rx based on recent cx showing +strep    Jovi Jackson MD  Urology

## 2024-05-03 LAB — BACTERIA UR CULT: ABNORMAL

## 2024-05-07 ENCOUNTER — HOSPITAL ENCOUNTER (EMERGENCY)
Facility: HOSPITAL | Age: 54
Discharge: HOME | End: 2024-05-08
Attending: FAMILY MEDICINE
Payer: COMMERCIAL

## 2024-05-07 ENCOUNTER — CLINICAL SUPPORT (OUTPATIENT)
Dept: REHABILITATION | Facility: HOSPITAL | Age: 54
End: 2024-05-07
Payer: COMMERCIAL

## 2024-05-07 DIAGNOSIS — R31.0 GROSS HEMATURIA: ICD-10-CM

## 2024-05-07 DIAGNOSIS — R10.9 FLANK PAIN: Primary | ICD-10-CM

## 2024-05-07 DIAGNOSIS — M25.611 DECREASED RANGE OF MOTION OF RIGHT SHOULDER: Primary | ICD-10-CM

## 2024-05-07 DIAGNOSIS — R29.898 DECREASED ROM OF NECK: ICD-10-CM

## 2024-05-07 DIAGNOSIS — Z74.09 DECREASED FUNCTIONAL MOBILITY AND ENDURANCE: ICD-10-CM

## 2024-05-07 LAB
ALBUMIN SERPL BCP-MCNC: 3.9 G/DL (ref 3.5–5.2)
ALP SERPL-CCNC: 130 U/L (ref 55–135)
ALT SERPL W/O P-5'-P-CCNC: 14 U/L (ref 10–44)
ANION GAP SERPL CALC-SCNC: 12 MMOL/L (ref 8–16)
APTT PPP: 26.7 SEC (ref 21–32)
AST SERPL-CCNC: 14 U/L (ref 10–40)
BACTERIA #/AREA URNS HPF: ABNORMAL /HPF
BASOPHILS # BLD AUTO: 0.05 K/UL (ref 0–0.2)
BASOPHILS NFR BLD: 0.6 % (ref 0–1.9)
BILIRUB SERPL-MCNC: 0.5 MG/DL (ref 0.1–1)
BILIRUB UR QL STRIP: NEGATIVE
BUN SERPL-MCNC: 11 MG/DL (ref 6–20)
CALCIUM SERPL-MCNC: 9.9 MG/DL (ref 8.7–10.5)
CHLORIDE SERPL-SCNC: 105 MMOL/L (ref 95–110)
CLARITY UR: CLEAR
CO2 SERPL-SCNC: 21 MMOL/L (ref 23–29)
COLOR UR: COLORLESS
CREAT SERPL-MCNC: 0.8 MG/DL (ref 0.5–1.4)
DIFFERENTIAL METHOD BLD: ABNORMAL
EOSINOPHIL # BLD AUTO: 0.1 K/UL (ref 0–0.5)
EOSINOPHIL NFR BLD: 1.3 % (ref 0–8)
ERYTHROCYTE [DISTWIDTH] IN BLOOD BY AUTOMATED COUNT: 12 % (ref 11.5–14.5)
EST. GFR  (NO RACE VARIABLE): >60 ML/MIN/1.73 M^2
GLUCOSE SERPL-MCNC: 317 MG/DL (ref 70–110)
GLUCOSE UR QL STRIP: ABNORMAL
HCT VFR BLD AUTO: 37.9 % (ref 37–48.5)
HCV AB SERPL QL IA: NEGATIVE
HEP C VIRUS HOLD SPECIMEN: NORMAL
HGB BLD-MCNC: 13 G/DL (ref 12–16)
HGB UR QL STRIP: ABNORMAL
HIV 1+2 AB+HIV1 P24 AG SERPL QL IA: NEGATIVE
IMM GRANULOCYTES # BLD AUTO: 0.09 K/UL (ref 0–0.04)
IMM GRANULOCYTES NFR BLD AUTO: 1 % (ref 0–0.5)
INR PPP: 0.9 (ref 0.8–1.2)
KETONES UR QL STRIP: NEGATIVE
LEUKOCYTE ESTERASE UR QL STRIP: ABNORMAL
LYMPHOCYTES # BLD AUTO: 3 K/UL (ref 1–4.8)
LYMPHOCYTES NFR BLD: 33.8 % (ref 18–48)
MCH RBC QN AUTO: 28.9 PG (ref 27–31)
MCHC RBC AUTO-ENTMCNC: 34.3 G/DL (ref 32–36)
MCV RBC AUTO: 84 FL (ref 82–98)
MICROSCOPIC COMMENT: ABNORMAL
MONOCYTES # BLD AUTO: 0.6 K/UL (ref 0.3–1)
MONOCYTES NFR BLD: 6.2 % (ref 4–15)
NEUTROPHILS # BLD AUTO: 5.1 K/UL (ref 1.8–7.7)
NEUTROPHILS NFR BLD: 57.1 % (ref 38–73)
NITRITE UR QL STRIP: NEGATIVE
NRBC BLD-RTO: 0 /100 WBC
PH UR STRIP: 6 [PH] (ref 5–8)
PLATELET # BLD AUTO: 243 K/UL (ref 150–450)
PMV BLD AUTO: 10 FL (ref 9.2–12.9)
POTASSIUM SERPL-SCNC: 3.9 MMOL/L (ref 3.5–5.1)
PROT SERPL-MCNC: 7.6 G/DL (ref 6–8.4)
PROT UR QL STRIP: NEGATIVE
PROTHROMBIN TIME: 10.2 SEC (ref 9–12.5)
RBC # BLD AUTO: 4.5 M/UL (ref 4–5.4)
RBC #/AREA URNS HPF: >100 /HPF (ref 0–4)
SODIUM SERPL-SCNC: 138 MMOL/L (ref 136–145)
SP GR UR STRIP: >1.03 (ref 1–1.03)
SQUAMOUS #/AREA URNS HPF: 1 /HPF
URN SPEC COLLECT METH UR: ABNORMAL
UROBILINOGEN UR STRIP-ACNC: NEGATIVE EU/DL
WBC # BLD AUTO: 8.9 K/UL (ref 3.9–12.7)
WBC #/AREA URNS HPF: 0 /HPF (ref 0–5)
YEAST URNS QL MICRO: ABNORMAL

## 2024-05-07 PROCEDURE — 80053 COMPREHEN METABOLIC PANEL: CPT | Performed by: NURSE PRACTITIONER

## 2024-05-07 PROCEDURE — 85025 COMPLETE CBC W/AUTO DIFF WBC: CPT | Performed by: NURSE PRACTITIONER

## 2024-05-07 PROCEDURE — 99285 EMERGENCY DEPT VISIT HI MDM: CPT | Mod: 25

## 2024-05-07 PROCEDURE — 97112 NEUROMUSCULAR REEDUCATION: CPT | Mod: PN

## 2024-05-07 PROCEDURE — 87389 HIV-1 AG W/HIV-1&-2 AB AG IA: CPT | Performed by: EMERGENCY MEDICINE

## 2024-05-07 PROCEDURE — 87086 URINE CULTURE/COLONY COUNT: CPT | Performed by: NURSE PRACTITIONER

## 2024-05-07 PROCEDURE — 85610 PROTHROMBIN TIME: CPT | Performed by: NURSE PRACTITIONER

## 2024-05-07 PROCEDURE — 97140 MANUAL THERAPY 1/> REGIONS: CPT | Mod: PN

## 2024-05-07 PROCEDURE — 85730 THROMBOPLASTIN TIME PARTIAL: CPT | Performed by: NURSE PRACTITIONER

## 2024-05-07 PROCEDURE — 81000 URINALYSIS NONAUTO W/SCOPE: CPT | Performed by: NURSE PRACTITIONER

## 2024-05-07 PROCEDURE — 97110 THERAPEUTIC EXERCISES: CPT | Mod: PN

## 2024-05-07 PROCEDURE — 96374 THER/PROPH/DIAG INJ IV PUSH: CPT

## 2024-05-07 PROCEDURE — 86803 HEPATITIS C AB TEST: CPT | Performed by: EMERGENCY MEDICINE

## 2024-05-07 RX ORDER — CEFUROXIME AXETIL 500 MG/1
500 TABLET ORAL 2 TIMES DAILY
COMMUNITY
Start: 2024-02-02 | End: 2024-05-08

## 2024-05-07 RX ORDER — DIPHENOXYLATE HYDROCHLORIDE AND ATROPINE SULFATE 2.5; .025 MG/1; MG/1
2.5 TABLET ORAL
COMMUNITY

## 2024-05-07 RX ORDER — FLUCONAZOLE 150 MG/1
150 TABLET ORAL ONCE
COMMUNITY
Start: 2023-12-28

## 2024-05-07 RX ORDER — ESCITALOPRAM OXALATE 10 MG/1
10 TABLET ORAL
COMMUNITY
Start: 2023-12-27

## 2024-05-07 RX ORDER — PHENAZOPYRIDINE HYDROCHLORIDE 200 MG/1
200 TABLET, FILM COATED ORAL 3 TIMES DAILY
COMMUNITY
Start: 2024-02-02

## 2024-05-07 RX ORDER — RISANKIZUMAB-RZAA 150 MG/ML
INJECTION SUBCUTANEOUS
COMMUNITY
Start: 2024-01-08

## 2024-05-07 RX ORDER — KETOROLAC TROMETHAMINE 30 MG/ML
30 INJECTION, SOLUTION INTRAMUSCULAR; INTRAVENOUS
Status: COMPLETED | OUTPATIENT
Start: 2024-05-08 | End: 2024-05-07

## 2024-05-07 RX ORDER — ERYTHROMYCIN 5 MG/G
OINTMENT OPHTHALMIC
COMMUNITY

## 2024-05-07 RX ORDER — ORPHENADRINE CITRATE 100 MG/1
100 TABLET, EXTENDED RELEASE ORAL 2 TIMES DAILY PRN
COMMUNITY
Start: 2024-01-12

## 2024-05-07 RX ORDER — HYDROCODONE BITARTRATE AND ACETAMINOPHEN 10; 325 MG/1; MG/1
TABLET ORAL
COMMUNITY

## 2024-05-07 RX ORDER — INSULIN DETEMIR 100 [IU]/ML
INJECTION, SOLUTION SUBCUTANEOUS
COMMUNITY
Start: 2023-09-14

## 2024-05-07 RX ORDER — DOXYCYCLINE 100 MG/1
1 TABLET ORAL 2 TIMES DAILY
COMMUNITY
Start: 2023-12-28

## 2024-05-07 RX ORDER — ONDANSETRON 4 MG/1
4 TABLET, ORALLY DISINTEGRATING ORAL
COMMUNITY

## 2024-05-07 RX ORDER — ONDANSETRON 8 MG/1
TABLET, ORALLY DISINTEGRATING ORAL
COMMUNITY
Start: 2024-04-04

## 2024-05-07 RX ORDER — BUPROPION HYDROCHLORIDE 150 MG/1
150 TABLET ORAL EVERY MORNING
COMMUNITY
Start: 2023-11-13

## 2024-05-07 RX ORDER — ROFLUMILAST 3 MG/G
CREAM TOPICAL
COMMUNITY
Start: 2024-02-02

## 2024-05-07 RX ORDER — SEMAGLUTIDE 2.68 MG/ML
INJECTION, SOLUTION SUBCUTANEOUS
COMMUNITY
Start: 2024-04-07

## 2024-05-07 RX ORDER — CIPROFLOXACIN 500 MG/1
500 TABLET ORAL 2 TIMES DAILY
COMMUNITY
Start: 2023-12-05

## 2024-05-07 RX ADMIN — KETOROLAC TROMETHAMINE 30 MG: 30 INJECTION, SOLUTION INTRAMUSCULAR at 11:05

## 2024-05-07 NOTE — PROGRESS NOTES
Physical Therapy Daily Treatment Note     Name: Yue Serrato  Clinic Number: [id number]    Therapy Diagnosis:   Encounter Diagnoses   Name Primary?    Decreased range of motion of right shoulder Yes    Decreased ROM of neck     Decreased functional mobility and endurance        Physician: Nilo Brito MD    Visit Date: 5/7/2024    Physician Orders: PT Eval and Treat   Medical Diagnosis from Referral:   M25.511,G89.29 (ICD-10-CM) - Chronic right shoulder pain   M75.31 (ICD-10-CM) - Calcific tendinitis of right shoulder   M67.911 (ICD-10-CM) - Tendinopathy of right rotator cuff      Evaluation Date: 3/25/2024  Authorization Period Expiration: 12/31/24  Plan of Care Expiration: 6/3/24  Visit # / Visits authorized: 6/20 including eval  Foto: 2/3 (53)   Progress Note due 30 days from 4/30/24  Precautions: Standard, DMII  Surgery 3/14/24    Weeks 1-2     Immobilization: Sling     Lifting restriction: 3-5 lbs (can of soup), no overhead reaching/lifting x2 weeks     Activity/work limitations: No overuse/repetitive activity     Week 3-5     Therapy: Start at the beginning of week 3     Immobilization: none     Return to work/activity: Per guidance of Physical therapist     Time In: 8:03 am  Time Out: 9:11 am  Total Billable Time: 68 minutes including PT and tech one on one time    SUBJECTIVE     Today, pt reports: she woke with some back pain this am.  She was compliant with home exercise program.  Response to previous treatment: no new issues  Functional change: feeling a little better with ROM and less stiffness after working on her HEP.    Pre-Treatment Pain: 2/10    Location: R shoulder      OBJECTIVE        TREATMENT     Yue received therapeutic exercises to develop strength, endurance, ROM, and flexibility for 8 minutes including:    Seated UBE for posterior scapular stability 6 min backwards only  Open book training to engage posterior T spine rotators 2x 10    Yue received the following manual  therapy techniques: Joint mobilizations, Manual traction, and Soft tissue Mobilization were applied to the: neck and R shoulder for 15 minutes, including:    Shoulder mobs A/P for external rotation, EROM flexion and internal rotation grade II-III  Soft tissue mobs to bicep and mid and anterior deltoid as well as suboccipitals      Yue participated in neuromuscular re-education activities to improve: Balance, Kinesthetic, and Proprioception for 45 minutes. The following activities were included:      Seated rows 30# with cues on sternal lift and scapular depression 3x 10  Seated med x  C spine machine 132# (2 inch seat) 2x 3 min (deviates to R rotation unless cued to correct)  After manual therapy on R shoulder:  Prone shoulder extension 3# 3x10 R  Prone T 3x 10 1# R  Side lying shoulder external rotation 1 # cues to slow eccentric and engage EROM 3x 10      Deferred:  Shoulder posterior humeral activation in supine like humeral head trying to touch table (scapular posterior tipping) 2x 10  Scapular posterior tipping with shoulder external rotation in supine 3x 10  Scapular posterior tipping with loading internal rotators 2# 2x 20 sec hold  Prone T 3x 10 R with cues on posterior scapular tipping  Prone shoulder extension 3x 10 R 1 #  Standing shoulder extension with cables 20 # 3x 10  Seated internal rotation with green theraband 3x 10Supine scapular protraction 1x 10; add 4# 3x 10  Bicep curls 4# 3x 10 B with cues on scapular retract/depression  3 rounds of  Seated hand slides on table top for scapular protraction 1x 10  Seated shoulder external rotation at table top 1x 10  Seated shoulder slides R to L abduction 1 x10  Seated shoulder flexion (arm in supination) 1x 10        Yue participated in dynamic functional therapeutic activities to improve functional performance for 0  minutes, including:      Yue participated in gait training to improve functional mobility and safety for 0  minutes,  including:      Yue received the following direct contact modalities after being cleared for contraindications:     Yue received the following supervised modalities after being cleared for contradictions:     Yue received hot pack for R shoulder 0 minutes to R shoulder and neck.    Yue received cold pack for 0 minutes to 0.  Yue received electrical stimulation for 0 minutes to 0      Home Exercises Provided and Patient Education Provided     Education/Self-Care provided: (during therex)    Patient educated on the importance of improved core and upper and lower extremity strength in order to improve alignment of the spine and upper and lower extremities with static positions and dynamic movement.   Patient educated on the importance of strong core and lower extremity musculature in order to improve both static and dynamic balance, improve gait mechanics, reduce fall risk and improve household and community mobility.       Written Home Exercises Provided: Patient instructed to cont prior HEP.  Exercises were reviewed and Yue was able to demonstrate them prior to the end of the session.  Yue demonstrated good  understanding of the education provided.     See EMR under Patient Instructions for exercises provided 4/1/2024.    ASSESSMENT   Pt tolerated therex and manual therapy with pt making gains adding load. EROM flexion is now full but mild pain remains as she is still lacking a few degrees of external and internal rotation and tends to anterior tip at the scapula some still. PT advanced load as tolerated.  Pt needed intermittent cues to reduce R shoulder hikes still but is much more aware and can correct with less manual direction from PT.  Pt has been unable to attend consistently limiting some progress.  Pt demonstrated good understanding of exercises and required moderate cueing to maintain proper form.      Yue Is progressing well towards her goals.   Pt prognosis is Good.     Pt  will continue to benefit from skilled outpatient physical therapy to address the deficits listed in the problem list box on initial evaluation, provide pt/family education and to maximize pt's level of independence in the home and community environment.     Pt's spiritual, cultural and educational needs considered and pt agreeable to plan of care and goals.     Anticipated barriers to physical therapy: none    Goals:   Short Term Goals: In 4 weeks   1.Pt to be educated on HEP. met  2.Patient to increase GH ROM to 150 degrees flexion or better. met  3.Patient to increase strength to 3+/5 with external rotation or better. progressing  4.Patient to have pain less than 5/10 or better at all times. progressing  5.Patient to improve score on the FOTO by 10%. met  6. Pt to be educated on postural and body mechanics awareness. progressing     Long Term Goals: In 10 weeks  1. Patient to improve score on the FOTO to 66.  2. Patient to demo increase in UE strength to 4/5 with flexion and rotation  3. Patient to have decreased pain to 2/10 at all times.  4. Patient to demo increase GH ROM to 65 degrees or better with Internal rotation and reaching behind back to T9 or better  5. Patient to perform daily activities including dressing without help, lifting up to 5 # overhead and carrying up to 20 # or better in the R UE.        Plan   Plan of care Certification: 3/25/2024 to 6/3/24.     Outpatient Physical Therapy 2 times weekly for 10 weeks to include the following interventions: Cervical/Lumbar Traction, Electrical Stimulation IFC, NMES, Manual Therapy, Moist Heat/ Ice, Neuromuscular Re-ed, Patient Education, Self Care, Therapeutic Activities, Therapeutic Exercise, and DN.      Continue Plan of Care (POC) and progress per patient tolerance.    Ntihya Hummel, PT, CIDN, SFMA

## 2024-05-08 VITALS
RESPIRATION RATE: 20 BRPM | HEART RATE: 84 BPM | BODY MASS INDEX: 30.83 KG/M2 | HEIGHT: 62 IN | SYSTOLIC BLOOD PRESSURE: 164 MMHG | OXYGEN SATURATION: 100 % | DIASTOLIC BLOOD PRESSURE: 79 MMHG | TEMPERATURE: 98 F | WEIGHT: 167.56 LBS

## 2024-05-08 PROCEDURE — 63600175 PHARM REV CODE 636 W HCPCS: Performed by: FAMILY MEDICINE

## 2024-05-08 PROCEDURE — 25000003 PHARM REV CODE 250: Performed by: FAMILY MEDICINE

## 2024-05-08 RX ORDER — CEFUROXIME AXETIL 500 MG/1
500 TABLET ORAL 2 TIMES DAILY
Qty: 10 TABLET | Refills: 0 | Status: SHIPPED | OUTPATIENT
Start: 2024-05-08

## 2024-05-08 RX ORDER — MORPHINE SULFATE 4 MG/ML
4 INJECTION, SOLUTION INTRAMUSCULAR; INTRAVENOUS
Status: DISCONTINUED | OUTPATIENT
Start: 2024-05-08 | End: 2024-05-08 | Stop reason: HOSPADM

## 2024-05-08 RX ORDER — HYDROCODONE BITARTRATE AND ACETAMINOPHEN 10; 325 MG/1; MG/1
1 TABLET ORAL
Status: COMPLETED | OUTPATIENT
Start: 2024-05-08 | End: 2024-05-08

## 2024-05-08 RX ORDER — LIDOCAINE HYDROCHLORIDE 20 MG/ML
JELLY TOPICAL
Status: COMPLETED | OUTPATIENT
Start: 2024-05-08 | End: 2024-05-08

## 2024-05-08 RX ADMIN — LIDOCAINE HYDROCHLORIDE 5 ML: 20 JELLY TOPICAL at 01:05

## 2024-05-08 RX ADMIN — HYDROCODONE BITARTRATE AND ACETAMINOPHEN 1 TABLET: 10; 325 TABLET ORAL at 01:05

## 2024-05-08 NOTE — ED PROVIDER NOTES
"SCRIBE #1 NOTE: I, Nitish Dyson, am scribing for, and in the presence of, Cecy Mo MD. I have scribed the HPI, ROS, and PE.     SCRIBE #2 NOTE: I, Roderick Anglin, am scribing for, and in the presence of,  Simran Marti MD. I have scribed the remaining portions of the note not scribed by Scribe #1.      History     Chief Complaint   Patient presents with    Flank Pain     Pt c/o bilat flank pain w/ blood in urine today. Pt reports recent bladder biopsy. Hx of UTIs. +abx. Denies bldthnrs     Review of patient's allergies indicates:   Allergen Reactions    Morphine Other (See Comments)     Reports "headache"  Other reaction(s): HEADACHE         History of Present Illness     [facility]    2024, 12:58 AM  History obtained from the patient      History of Present Illness: Yue Serrato is a 54 y.o. female patient with a PMHx of HTN and DM who presents to the Emergency Department for evaluation of bilateral flank pain with blood in urine which onset PTA. Symptoms are constant and moderate in severity. No mitigating or exacerbating factors reported. Patient denies any fever, chills, HA, CP, numbness, SOB, and all other sxs at this time. No further complaints or concerns at this time.       Arrival mode: Personal vehicle    PCP: Melanie Vigil FNP        Past Medical History:  Past Medical History:   Diagnosis Date    Anxiety     Depression     Diabetes mellitus type II     Diverticulitis 2012    History of abnormal cervical Pap smear     HSV infection     Hyperlipidemia     Hypertension     Liver disease     fatty liver       Past Surgical History:  Past Surgical History:   Procedure Laterality Date     SECTION      x3    COLONOSCOPY  2019    normal    CYSTOSCOPY WITH BIOPSY OF BLADDER N/A 2024    Procedure: CYSTOSCOPY, WITH BLADDER BIOPSY, WITH FULGURATION IF INDICATED;  Surgeon: Jovi Jackson MD;  Location: [facility];  Service: Urology;  Laterality: N/A;    ESOPHAGOGASTRODUODENOSCOPY N/A " 09/20/2021    Procedure: EGD (ESOPHAGOGASTRODUODENOSCOPY);  Surgeon: Prerna Poole MD;  Location: [facility];  Service: Endoscopy;  Laterality: N/A;    ESOPHAGOGASTRODUODENOSCOPY N/A 03/20/2023    Procedure: EGD (ESOPHAGOGASTRODUODENOSCOPY);  Surgeon: Hector Reyes MD;  Location: [facility] ENDO;  Service: Endoscopy;  Laterality: N/A;    LIPOMA RESECTION      upper back    TOTAL ABDOMINAL HYSTERECTOMY W/ BILATERAL SALPINGOOPHORECTOMY Bilateral 11/2005    dyplasia         Family History:  Family History   Problem Relation Name Age of Onset    Heart disease Mother          CAD    Diabetes Mother      Hypertension Mother      Hyperlipidemia Mother      COPD Mother      COPD Father      Diabetes Father      Hyperlipidemia Father      Hypertension Father      Heart disease Father          MI    Colon cancer Neg Hx         Social History:  Social History     Tobacco Use    Smoking status: Never     Passive exposure: Never    Smokeless tobacco: Never   Substance and Sexual Activity    Alcohol use: Yes     Comment: occasionally    Drug use: No    Sexual activity: Yes     Partners: Male     Birth control/protection: See Surgical Hx     Comment: NIGEL/BSO        Review of Systems     Review of Systems   Constitutional:  Negative for chills and fever.   HENT:  Negative for sore throat.    Respiratory:  Negative for shortness of breath.    Cardiovascular:  Negative for chest pain.   Gastrointestinal:  Negative for nausea.   Genitourinary:  Positive for flank pain. Negative for dysuria.   Musculoskeletal:  Negative for back pain.   Skin:  Negative for rash.   Neurological:  Negative for weakness, numbness and headaches.   Hematological:  Does not bruise/bleed easily.   All other systems reviewed and are negative.       Physical Exam     Initial Vitals [05/07/24 2031]   BP Pulse Resp Temp SpO2   (!) 182/83 95 18 97.7 °F (36.5 °C) 97 %      MAP       --          Physical Exam  Nursing Notes and Vital Signs  "Reviewed.  Constitutional: Patient is in no apparent distress. Well-developed and well-nourished.  Head: Atraumatic. Normocephalic.  Eyes: PERRL. EOM intact. Conjunctivae are not pale. No scleral icterus.  ENT: Mucous membranes are moist. Oropharynx is clear and symmetric.    Neck: Supple. Full ROM. No lymphadenopathy.  Cardiovascular: Regular rate. Regular rhythm. No murmurs, rubs, or gallops. Distal pulses are 2+ and symmetric.  Pulmonary/Chest: No respiratory distress. Clear to auscultation bilaterally. No wheezing or rales.  Abdominal: Soft and non-distended.  There is no tenderness.  No rebound, guarding, or rigidity. Good bowel sounds. Bilateral flank tenderness.   Genitourinary: No CVA tenderness  Musculoskeletal: Moves all extremities. No obvious deformities. No edema. No calf tenderness.  Skin: Warm and dry.  Neurological:  Alert, awake, and appropriate.  Normal speech.  No acute focal neurological deficits are appreciated.  Psychiatric: Normal affect. Good eye contact. Appropriate in content.     ED Course   Procedures  ED Vital Signs:  Vitals:    05/07/24 2031 05/08/24 0113 05/08/24 0115 05/08/24 0256   BP: (!) 182/83  (!) 147/78 (!) 164/79   Pulse: 95  88 84   Resp: 18 20  20   Temp: 97.7 °F (36.5 °C)  98 °F (36.7 °C) 98 °F (36.7 °C)   TempSrc: Oral   Oral   SpO2: 97%   100%   Weight: 76 kg (167 lb 8.8 oz)      Height: 5' 2" (1.575 m)          Abnormal Lab Results:  Labs Reviewed   CBC W/ AUTO DIFFERENTIAL - Abnormal; Notable for the following components:       Result Value    Immature Granulocytes 1.0 (*)     Immature Grans (Abs) 0.09 (*)     All other components within normal limits   COMPREHENSIVE METABOLIC PANEL - Abnormal; Notable for the following components:    CO2 21 (*)     Glucose 317 (*)     All other components within normal limits   URINALYSIS, REFLEX TO URINE CULTURE - Abnormal; Notable for the following components:    Color, UA Colorless (*)     Specific Gravity, UA >1.030 (*)     Glucose, " UA 4+ (*)     Occult Blood UA 3+ (*)     Leukocytes, UA 1+ (*)     All other components within normal limits    Narrative:     Specimen Source->Urine   URINALYSIS MICROSCOPIC - Abnormal; Notable for the following components:    RBC, UA >100 (*)     All other components within normal limits    Narrative:     Specimen Source->Urine   CULTURE, URINE   CULTURE, URINE   HIV 1 / 2 ANTIBODY    Narrative:     Release to patient->Immediate   HEPATITIS C ANTIBODY    Narrative:     Release to patient->Immediate   HEP C VIRUS HOLD SPECIMEN    Narrative:     Release to patient->Immediate   PROTIME-INR   APTT        All Lab Results:  Results for orders placed or performed during the hospital encounter of 05/07/24   HIV 1/2 Ag/Ab (4th Gen)   Result Value Ref Range    HIV 1/2 Ag/Ab Negative Negative   Hepatitis C Antibody   Result Value Ref Range    Hepatitis C Ab Negative Negative   HCV Virus Hold Specimen   Result Value Ref Range    HEP C Virus Hold Specimen Hold for HCV sendout    CBC auto differential   Result Value Ref Range    WBC 8.90 3.90 - 12.70 K/uL    RBC 4.50 4.00 - 5.40 M/uL    Hemoglobin 13.0 12.0 - 16.0 g/dL    Hematocrit 37.9 37.0 - 48.5 %    MCV 84 82 - 98 fL    MCH 28.9 27.0 - 31.0 pg    MCHC 34.3 32.0 - 36.0 g/dL    RDW 12.0 11.5 - 14.5 %    Platelets 243 150 - 450 K/uL    MPV 10.0 9.2 - 12.9 fL    Immature Granulocytes 1.0 (H) 0.0 - 0.5 %    Gran # (ANC) 5.1 1.8 - 7.7 K/uL    Immature Grans (Abs) 0.09 (H) 0.00 - 0.04 K/uL    Lymph # 3.0 1.0 - 4.8 K/uL    Mono # 0.6 0.3 - 1.0 K/uL    Eos # 0.1 0.0 - 0.5 K/uL    Baso # 0.05 0.00 - 0.20 K/uL    nRBC 0 0 /100 WBC    Gran % 57.1 38.0 - 73.0 %    Lymph % 33.8 18.0 - 48.0 %    Mono % 6.2 4.0 - 15.0 %    Eosinophil % 1.3 0.0 - 8.0 %    Basophil % 0.6 0.0 - 1.9 %    Differential Method Automated    Comprehensive metabolic panel   Result Value Ref Range    Sodium 138 136 - 145 mmol/L    Potassium 3.9 3.5 - 5.1 mmol/L    Chloride 105 95 - 110 mmol/L    CO2 21 (L) 23 - 29  mmol/L    Glucose 317 (H) 70 - 110 mg/dL    BUN 11 6 - 20 mg/dL    Creatinine 0.8 0.5 - 1.4 mg/dL    Calcium 9.9 8.7 - 10.5 mg/dL    Total Protein 7.6 6.0 - 8.4 g/dL    Albumin 3.9 3.5 - 5.2 g/dL    Total Bilirubin 0.5 0.1 - 1.0 mg/dL    Alkaline Phosphatase 130 55 - 135 U/L    AST 14 10 - 40 U/L    ALT 14 10 - 44 U/L    eGFR >60 >60 mL/min/1.73 m^2    Anion Gap 12 8 - 16 mmol/L   Urinalysis, Reflex to Urine Culture Urine, Clean Catch    Specimen: Urine   Result Value Ref Range    Specimen UA Urine, Clean Catch     Color, UA Colorless (A) Yellow, Straw, Naa    Appearance, UA Clear Clear    pH, UA 6.0 5.0 - 8.0    Specific Gravity, UA >1.030 (A) 1.005 - 1.030    Protein, UA Negative Negative    Glucose, UA 4+ (A) Negative    Ketones, UA Negative Negative    Bilirubin (UA) Negative Negative    Occult Blood UA 3+ (A) Negative    Nitrite, UA Negative Negative    Urobilinogen, UA Negative <2.0 EU/dL    Leukocytes, UA 1+ (A) Negative   Protime-INR   Result Value Ref Range    Prothrombin Time 10.2 9.0 - 12.5 sec    INR 0.9 0.8 - 1.2   APTT   Result Value Ref Range    aPTT 26.7 21.0 - 32.0 sec   Urinalysis Microscopic   Result Value Ref Range    RBC, UA >100 (H) 0 - 4 /hpf    WBC, UA 0 0 - 5 /hpf    Bacteria Rare None-Occ /hpf    Yeast, UA None None    Squam Epithel, UA 1 /hpf    Microscopic Comment SEE COMMENT          Imaging Results:  Imaging Results              CT Renal Stone Study ABD Pelvis WO (Final result)  Result time 05/08/24 00:39:52      Final result by Carl Villagran MD (05/08/24 00:39:52)                   Impression:      Bladder wall thickening.  Correlate for cystitis.      Electronically signed by: Carl Villagran  Date:    05/08/2024  Time:    00:39               Narrative:    EXAMINATION:  CT RENAL STONE STUDY ABD PELVIS WO    CLINICAL HISTORY:  Flank pain, kidney stone suspected;    TECHNIQUE:  Low dose axial images, sagittal and coronal reformations were obtained from the lung bases to  the pubic symphysis, Oral contrast was not administered.    COMPARISON:  09/20/2022    FINDINGS:  Heart: Normal in size. No pericardial effusion.    Lung Bases: Well aerated, without consolidation or pleural fluid.    Liver: Normal in size and attenuation, with no focal hepatic lesions.    Gallbladder: No calcified gallstones.    Bile Ducts: No evidence of dilated ducts.    Pancreas: No mass or peripancreatic fat stranding.    Spleen: Unremarkable.    Adrenals: Unremarkable.    Kidneys/ Ureters: Unremarkable.    Bladder: Mild circumferential bladder wall thickening.    Reproductive organs: Unremarkable.    GI Tract/Mesentery: No evidence of bowel obstruction or inflammation.  Diverticulosis.  No diverticulitis.    Peritoneal Space: No ascites. No free air.    Retroperitoneum: No significant adenopathy.    Abdominal wall: Unremarkable.    Vasculature: No significant atherosclerosis or aneurysm.    Bones: No acute fracture.                                            ED Discussion     2:00 AM: Dr. Mo transfers care of patient to Dr. Marti pending lab results.    2:28 AM: Reassessed pt at this time. Discussed with pt all pertinent ED information and results. Discussed pt dx and plan of tx. Gave pt all f/u and return to the ED instructions. All questions and concerns were addressed at this time. Pt expresses understanding of information and instructions, and is comfortable with plan to discharge. Pt is stable for discharge.    I discussed with patient and/or family/caretaker that evaluation in the ED does not suggest any emergent or life threatening medical conditions requiring immediate intervention beyond what was provided in the ED, and I believe patient is safe for discharge.  Regardless, an unremarkable evaluation in the ED does not preclude the development or presence of a serious of life threatening condition. As such, patient was instructed to return immediately for any worsening or change in current  symptoms.         Medical Decision Making  Amount and/or Complexity of Data Reviewed  Labs: ordered. Decision-making details documented in ED Course.  Radiology: ordered. Decision-making details documented in ED Course.    Risk  Prescription drug management.                ED Medication(s):  Medications   ketorolac injection 30 mg (30 mg Intravenous Given 5/7/24 5206)   HYDROcodone-acetaminophen  mg per tablet 1 tablet (1 tablet Oral Given 5/8/24 0113)   LIDOcaine HCl 2% urojet (5 mLs Mucous Membrane Given 5/8/24 0152)       Discharge Medication List as of 5/8/2024  2:28 AM           Follow-up Information       Jovi Jackson MD In 2 days.    Specialty: Urology  Contact information:  [id number] [place] [address]  [phone]               [facility] - Emergency Dept..    Specialty: Emergency Medicine  Why: As needed, If symptoms worsen  Contact information:  [address]  [facility] [id number] [phone]                               Scribe Attestation:   Scribe #1: I performed the above scribed service and the documentation accurately describes the services I performed. I attest to the accuracy of the note.     Attending:   Physician Attestation Statement for Scribe #1: I, Cecy Mo MD, personally performed the services described in this documentation, as scribed by Nitish Dyson, in my presence, and it is both accurate and complete.       Scribe Attestation:   Scribe #2: I performed the above scribed service and the documentation accurately describes the services I performed. I attest to the accuracy of the note.    Attending Attestation:           Physician Attestation for Scribe:    Physician Attestation Statement for Scribe #2: I, Simran Marti MD, reviewed documentation, as scribed by Roderick Anglin in my presence, and it is both accurate and complete. I also acknowledge and confirm the content of the note done by Scribe #1.           Clinical  Impression       ICD-10-CM ICD-9-CM   1. Flank pain  R10.9 789.09   2. Gross hematuria  R31.0 599.71       Disposition:   Disposition: Discharged  Condition: Stable         Cecy Mo MD  05/09/24 0023

## 2024-05-08 NOTE — FIRST PROVIDER EVALUATION
"Medical screening examination initiated.  I have conducted a focused provider triage encounter, findings are as follows:    Brief history of present illness:  54-year-old female presents emergency department for hematuria and lower back/flank pain.  Patient reports recent urinary procedure patient is also on antibiotics for recent UTI.    Vitals:    05/07/24 2031   BP: (!) 182/83   BP Location: Right arm   Patient Position: Sitting   Pulse: 95   Resp: 18   Temp: 97.7 °F (36.5 °C)   TempSrc: Oral   SpO2: 97%   Weight: 76 kg (167 lb 8.8 oz)   Height: 5' 2" (1.575 m)       Pertinent physical exam:  NAD    Brief workup plan:  Labs, imaging, further eval    Preliminary workup initiated; this workup will be continued and followed by the physician or advanced practice provider that is assigned to the patient when roomed.  "

## 2024-05-09 ENCOUNTER — PATIENT MESSAGE (OUTPATIENT)
Dept: UROLOGY | Facility: CLINIC | Age: 54
End: 2024-05-09
Payer: COMMERCIAL

## 2024-05-09 ENCOUNTER — TELEPHONE (OUTPATIENT)
Dept: UROLOGY | Facility: CLINIC | Age: 54
End: 2024-05-09
Payer: COMMERCIAL

## 2024-05-09 DIAGNOSIS — R31.21 ASYMPTOMATIC MICROSCOPIC HEMATURIA: Primary | ICD-10-CM

## 2024-05-09 LAB — BACTERIA UR CULT: NO GROWTH

## 2024-05-13 ENCOUNTER — OFFICE VISIT (OUTPATIENT)
Dept: UROLOGY | Facility: CLINIC | Age: 54
End: 2024-05-13
Payer: COMMERCIAL

## 2024-05-13 VITALS
WEIGHT: 165.38 LBS | HEIGHT: 62 IN | BODY MASS INDEX: 30.44 KG/M2 | DIASTOLIC BLOOD PRESSURE: 86 MMHG | HEART RATE: 85 BPM | SYSTOLIC BLOOD PRESSURE: 143 MMHG

## 2024-05-13 DIAGNOSIS — N30.00 ACUTE CYSTITIS WITHOUT HEMATURIA: Primary | ICD-10-CM

## 2024-05-13 PROBLEM — M25.611 DECREASED RANGE OF MOTION OF RIGHT SHOULDER: Status: RESOLVED | Noted: 2024-03-25 | Resolved: 2024-05-13

## 2024-05-13 PROBLEM — R29.898 DECREASED ROM OF NECK: Status: RESOLVED | Noted: 2024-03-25 | Resolved: 2024-05-13

## 2024-05-13 LAB
BILIRUB UR QL STRIP: NEGATIVE
GLUCOSE UR QL STRIP: POSITIVE
KETONES UR QL STRIP: NEGATIVE
LEUKOCYTE ESTERASE UR QL STRIP: POSITIVE
PH, POC UA: 6.5
POC BLOOD, URINE: POSITIVE
POC NITRATES, URINE: NEGATIVE
POC RESIDUAL URINE VOLUME: 9 ML (ref 0–100)
PROT UR QL STRIP: NEGATIVE
SP GR UR STRIP: 1.03 (ref 1–1.03)
UROBILINOGEN UR STRIP-ACNC: 0.2 (ref 0.1–1.1)

## 2024-05-13 PROCEDURE — 51798 US URINE CAPACITY MEASURE: CPT | Mod: S$GLB,,, | Performed by: NURSE PRACTITIONER

## 2024-05-13 PROCEDURE — 1160F RVW MEDS BY RX/DR IN RCRD: CPT | Mod: CPTII,S$GLB,, | Performed by: NURSE PRACTITIONER

## 2024-05-13 PROCEDURE — 1159F MED LIST DOCD IN RCRD: CPT | Mod: CPTII,S$GLB,, | Performed by: NURSE PRACTITIONER

## 2024-05-13 PROCEDURE — 99214 OFFICE O/P EST MOD 30 MIN: CPT | Mod: S$GLB,,, | Performed by: NURSE PRACTITIONER

## 2024-05-13 PROCEDURE — 3077F SYST BP >= 140 MM HG: CPT | Mod: CPTII,S$GLB,, | Performed by: NURSE PRACTITIONER

## 2024-05-13 PROCEDURE — 3079F DIAST BP 80-89 MM HG: CPT | Mod: CPTII,S$GLB,, | Performed by: NURSE PRACTITIONER

## 2024-05-13 PROCEDURE — 81003 URINALYSIS AUTO W/O SCOPE: CPT | Mod: QW,S$GLB,, | Performed by: NURSE PRACTITIONER

## 2024-05-13 PROCEDURE — 3008F BODY MASS INDEX DOCD: CPT | Mod: CPTII,S$GLB,, | Performed by: NURSE PRACTITIONER

## 2024-05-13 PROCEDURE — 99999 PR PBB SHADOW E&M-EST. PATIENT-LVL V: CPT | Mod: PBBFAC,,, | Performed by: NURSE PRACTITIONER

## 2024-05-13 NOTE — PROGRESS NOTES
Chief Complaint:   Recurrent urinary tract infections    HPI:   Patient is presenting as a follow-up to recurrent urinary tract infection.  States that she had flank pain and presented to ED. CT scan renal stone study was negative, for the exception of, mild bladder wall thickening.  Urine in clinic indicates trace blood, all other parameters are negative.  Denies gross hematuria.  Is currently on oral antibiotics, prescribed by ED. in reviewing EMR, ED urine culture was negative.  Patient states that she has had an increase in fatigue over the last month.  Renetta GRANDE  04/30/2024 - returns today for follow-up and review of her pathology, this shows no cancer from her biopsy, patient notes worsening issues with dysuria and frequency, urine shows blood leukocytes and +glucose     04/02/2024 - patient returns today for cystoscopy, no recent UTIs     02/23/2024 - 54 yo female that presents for evaluation of recurrent UTIs and microscopic hematuria.  Patient notes that she has had UTIs all my life but more recently over the last 2-3 months she has had issues with recurrent infections.  Notes that it takes towards the end of the antibiotics cycle for her symptoms to improve, and then her symptoms will improve for 5-7 days and then will return.  Patient notes dysuria, urgency, and suprapubic discomfort.  Patient notes history of poorly-controlled diabetes, A1c was 12 and then improved to 8.5.  She has not currently sexually active, notes that the last time she had intercourse, she had discomfort and bleeding.  She denies any gross hematuria.  Denies family history of  cancers.  Allergies:  Morphine    Medications:  has a current medication list which includes the following prescription(s): acetaminophen, atorvastatin, atorvastatin, blood sugar diagnostic, bupropion, cefuroxime, ciprofloxacin hcl, clonazepam, diazepam, dicyclomine, diphenoxylate-atropine 2.5-0.025 mg, doxycycline monohydrate, erythromycin, escitalopram  oxalate, estradiol, fenofibrate, fenofibrate, fluconazole, fluticasone propionate, hydrocodone-acetaminophen, hydrocodone-acetaminophen, levemir flexpen, lancets, methenamine, nabumetone, nystatin, ondansetron, ondansetron, onetouch delica plus lancet, onetouch verio flex meter, orphenadrine, ozempic, ozempic, pantoprazole, phenazopyridine, skyrizi, tresiba flextouch u-200, trintellix, valacyclovir, and zoryve.    Review of Systems:  General: No fever, chills, fatigability, or weight loss.  Skin: No rashes, itching, or changes in color or texture of skin.  Chest: Denies KWAN, cyanosis, wheezing, cough, and sputum production.  Abdomen: Appetite fine. No weight loss. Denies diarrhea, abdominal pain, hematemesis, or blood in stool.  Musculoskeletal: No joint stiffness or swelling. Denies back pain.  : As above.  All other review of systems negative.    PMH:   has a past medical history of Anxiety, Depression, Diabetes mellitus type II, Diverticulitis (), History of abnormal cervical Pap smear, HSV infection, Hyperlipidemia, Hypertension, and Liver disease.    PSH:   has a past surgical history that includes  section; Lipoma resection; Esophagogastroduodenoscopy (N/A, 2021); Colonoscopy (); Total abdominal hysterectomy w/ bilateral salpingoophorectomy (Bilateral, 2005); Esophagogastroduodenoscopy (N/A, 2023); and Cystoscopy with biopsy of bladder (N/A, 2024).    FamHx: family history includes COPD in her father and mother; Diabetes in her father and mother; Heart disease in her father and mother; Hyperlipidemia in her father and mother; Hypertension in her father and mother.    SocHx:  reports that she has never smoked. She has never been exposed to tobacco smoke. She has never used smokeless tobacco. She reports current alcohol use. She reports that she does not use drugs.      Physical Exam:  General: A&Ox3, no apparent distress, no deformities  Neck: No masses, normal  thyroid  Lungs: normal inspiration, no use of accessory muscles  Heart: normal pulse, no arrhythmias  Abdomen: Soft, NT, ND, no masses, no hernias, no hepatosplenomegaly  Lymphatic: Neck and groin nodes negative  Skin: The skin is warm and dry. No jaundice.    Labs/Studies:   05/08/2024  EXAMINATION:  CT RENAL STONE STUDY ABD PELVIS WO     CLINICAL HISTORY:  Flank pain, kidney stone suspected;     TECHNIQUE:  Low dose axial images, sagittal and coronal reformations were obtained from the lung bases to the pubic symphysis, Oral contrast was not administered.     COMPARISON:  09/20/2022     FINDINGS:  Heart: Normal in size. No pericardial effusion.     Lung Bases: Well aerated, without consolidation or pleural fluid.     Liver: Normal in size and attenuation, with no focal hepatic lesions.     Gallbladder: No calcified gallstones.     Bile Ducts: No evidence of dilated ducts.     Pancreas: No mass or peripancreatic fat stranding.     Spleen: Unremarkable.     Adrenals: Unremarkable.     Kidneys/ Ureters: Unremarkable.     Bladder: Mild circumferential bladder wall thickening.     Reproductive organs: Unremarkable.     GI Tract/Mesentery: No evidence of bowel obstruction or inflammation.  Diverticulosis.  No diverticulitis.     Peritoneal Space: No ascites. No free air.     Retroperitoneum: No significant adenopathy.     Abdominal wall: Unremarkable.     Vasculature: No significant atherosclerosis or aneurysm.     Bones: No acute fracture.     Impression:     Bladder wall thickening.  Correlate for cystitis.     Impression/Plan:   Patient is currently on antibiotics, urine culture will not be sent.  Patient states that she is feeling slightly better, will continue to monitor and begin prescribed Hiprex twice daily after completion of current antibiotics.  Patient to use patient portal to schedule a follow-up appointment, either with myself or Dr. Jackson, as needed.

## 2024-05-14 ENCOUNTER — CLINICAL SUPPORT (OUTPATIENT)
Dept: REHABILITATION | Facility: HOSPITAL | Age: 54
End: 2024-05-14
Payer: COMMERCIAL

## 2024-05-14 DIAGNOSIS — Z74.09 DECREASED FUNCTIONAL MOBILITY AND ENDURANCE: Primary | ICD-10-CM

## 2024-05-14 PROCEDURE — 97112 NEUROMUSCULAR REEDUCATION: CPT | Mod: PN

## 2024-05-14 PROCEDURE — 97110 THERAPEUTIC EXERCISES: CPT | Mod: PN

## 2024-05-14 PROCEDURE — 97140 MANUAL THERAPY 1/> REGIONS: CPT | Mod: PN

## 2024-05-14 NOTE — PROGRESS NOTES
Physical Therapy Daily Treatment Note     Name: Yue Serrato  Clinic Number: [id number]    Therapy Diagnosis:   Encounter Diagnosis   Name Primary?    Decreased functional mobility and endurance Yes     Physician: Nilo Brito MD    Visit Date: 5/14/2024    Physician Orders: PT Eval and Treat   Medical Diagnosis from Referral:   M25.511,G89.29 (ICD-10-CM) - Chronic right shoulder pain   M75.31 (ICD-10-CM) - Calcific tendinitis of right shoulder   M67.911 (ICD-10-CM) - Tendinopathy of right rotator cuff      Evaluation Date: 3/25/2024  Authorization Period Expiration: 12/31/24  Plan of Care Expiration: 6/3/24  Visit # / Visits authorized: 7/20 including eval  Foto: 2/3 (53)   Progress Note due 30 days from 4/30/24  Precautions: Standard, DMII  Surgery 3/14/24    Weeks 1-2     Immobilization: Sling     Lifting restriction: 3-5 lbs (can of soup), no overhead reaching/lifting x2 weeks     Activity/work limitations: No overuse/repetitive activity     Week 3-5     Therapy: Start at the beginning of week 3     Immobilization: none     Return to work/activity: Per guidance of Physical therapist     Time In: 7:03 am  Time Out: 8:03 am  Total Billable Time: 60 minutes including PT and tech one on one time    SUBJECTIVE     Today, pt reports: she woke with some back pain this am.  She was compliant with home exercise program.  Response to previous treatment: no new issues  Functional change: feeling a little better with ROM and less stiffness after working on her HEP.    Pre-Treatment Pain: 2/10    Location: R shoulder      OBJECTIVE        TREATMENT     Yue received therapeutic exercises to develop strength, endurance, ROM, and flexibility for 8 minutes including:    Seated UBE for posterior scapular stability 6 min backwards only  Seated rows 35# with cues on sternal lift and scapular depression 3x 10     Deferred:  Open book training to engage posterior T spine rotators 2x 10    Yue received the following  manual therapy techniques: Joint mobilizations, Manual traction, and Soft tissue Mobilization were applied to the: neck and R shoulder for 15 minutes, including:    Shoulder mobs A/P for external rotation and internal rotation, EROM flexion and internal rotation grade II-III  Soft tissue mobs to bicep and mid and anterior deltoid, pect minor, major and bicep and anterior deltoid  Mild cervical traction      Yue participated in neuromuscular re-education activities to improve: Balance, Kinesthetic, and Proprioception for 37 minutes. The following activities were included:    Prone scapular retraction 2x 10 5#  Supine scapular protraction 1x 10; add 5# 3x 10  Prone shoulder extension 1# 3x10 R  Prone T 3x 10 1# R  Seated external rotation with red theraband 3x 10  Seated W 3x 10 with red theraband  Seated med x  C spine machine 135# (2 inch seat) 3 min      Deferred:  Side lying shoulder external rotation 1 # cues to slow eccentric and engage EROM 3x 10  Shoulder posterior humeral activation in supine like humeral head trying to touch table (scapular posterior tipping) 2x 10  Scapular posterior tipping with shoulder external rotation in supine 3x 10  Scapular posterior tipping with loading internal rotators 2# 2x 20 sec hold  Seated internal rotation with green theraband 3x 10  Bicep curls 4# 3x 10 B with cues on scapular retract/depression  3 rounds of  Seated hand slides on table top for scapular protraction 1x 10  Seated shoulder external rotation at table top 1x 10  Seated shoulder slides R to L abduction 1 x10  Seated shoulder flexion (arm in supination) 1x 10        Yue participated in dynamic functional therapeutic activities to improve functional performance for 0  minutes, including:      Yue participated in gait training to improve functional mobility and safety for 0  minutes, including:      Yue received the following direct contact modalities after being cleared for contraindications:      Yue received the following supervised modalities after being cleared for contradictions:     Yue received hot pack for R shoulder 0 minutes to R shoulder and neck.    Yue received cold pack for 0 minutes to 0.  Yue received electrical stimulation for 0 minutes to 0      Home Exercises Provided and Patient Education Provided     Education/Self-Care provided: (during therex)    Patient educated on the importance of improved core and upper and lower extremity strength in order to improve alignment of the spine and upper and lower extremities with static positions and dynamic movement.   Patient educated on the importance of strong core and lower extremity musculature in order to improve both static and dynamic balance, improve gait mechanics, reduce fall risk and improve household and community mobility.       Written Home Exercises Provided: Patient instructed to cont prior HEP.  Exercises were reviewed and Yue was able to demonstrate them prior to the end of the session.  Yue demonstrated good  understanding of the education provided.     See EMR under Patient Instructions for exercises provided 4/1/2024.    ASSESSMENT   Pt tolerated therex and manual therapy with PT noted mildly gains in external rotation still limited by tight subscapularis. Pt is very tender to touch at the pect and subscapularis. Pt flexion is now full in supine but functionally she fires the lateral deltoid still limiting EROM and hiking mildly.  Pt demonstrated good understanding of exercises and required moderate cueing to maintain proper form.      Yue Is progressing well towards her goals.   Pt prognosis is Good.     Pt will continue to benefit from skilled outpatient physical therapy to address the deficits listed in the problem list box on initial evaluation, provide pt/family education and to maximize pt's level of independence in the home and community environment.     Pt's spiritual, cultural and  educational needs considered and pt agreeable to plan of care and goals.     Anticipated barriers to physical therapy: none    Goals:   Short Term Goals: In 4 weeks   1.Pt to be educated on HEP. met  2.Patient to increase GH ROM to 150 degrees flexion or better. met  3.Patient to increase strength to 3+/5 with external rotation or better. progressing  4.Patient to have pain less than 5/10 or better at all times. progressing  5.Patient to improve score on the FOTO by 10%. met  6. Pt to be educated on postural and body mechanics awareness. progressing     Long Term Goals: In 10 weeks  1. Patient to improve score on the FOTO to 66.  2. Patient to demo increase in UE strength to 4/5 with flexion and rotation  3. Patient to have decreased pain to 2/10 at all times.  4. Patient to demo increase GH ROM to 65 degrees or better with Internal rotation and reaching behind back to T9 or better  5. Patient to perform daily activities including dressing without help, lifting up to 5 # overhead and carrying up to 20 # or better in the R UE.        Plan   Plan of care Certification: 3/25/2024 to 6/3/24.     Outpatient Physical Therapy 2 times weekly for 10 weeks to include the following interventions: Cervical/Lumbar Traction, Electrical Stimulation IFC, NMES, Manual Therapy, Moist Heat/ Ice, Neuromuscular Re-ed, Patient Education, Self Care, Therapeutic Activities, Therapeutic Exercise, and DN.      Continue Plan of Care (POC) and progress per patient tolerance.    Nithya Hummel, PT, CIDN, SFMA

## 2024-05-23 ENCOUNTER — CLINICAL SUPPORT (OUTPATIENT)
Dept: REHABILITATION | Facility: HOSPITAL | Age: 54
End: 2024-05-23
Payer: COMMERCIAL

## 2024-05-23 DIAGNOSIS — Z74.09 DECREASED FUNCTIONAL MOBILITY AND ENDURANCE: Primary | ICD-10-CM

## 2024-05-23 PROCEDURE — 97140 MANUAL THERAPY 1/> REGIONS: CPT | Mod: PN

## 2024-05-23 PROCEDURE — 97112 NEUROMUSCULAR REEDUCATION: CPT | Mod: PN

## 2024-05-23 PROCEDURE — 97110 THERAPEUTIC EXERCISES: CPT | Mod: PN

## 2024-05-23 PROCEDURE — 97014 ELECTRIC STIMULATION THERAPY: CPT | Mod: PN

## 2024-05-23 NOTE — PROGRESS NOTES
Physical Therapy Daily Treatment Note     Name: Yue Serrato  Clinic Number: [id number]    Therapy Diagnosis:   Encounter Diagnosis   Name Primary?    Decreased functional mobility and endurance Yes     Physician: Nilo Brito MD    Visit Date: 5/23/2024    Physician Orders: PT Eval and Treat   Medical Diagnosis from Referral:   M25.511,G89.29 (ICD-10-CM) - Chronic right shoulder pain   M75.31 (ICD-10-CM) - Calcific tendinitis of right shoulder   M67.911 (ICD-10-CM) - Tendinopathy of right rotator cuff      Evaluation Date: 3/25/2024  Authorization Period Expiration: 12/31/24  Plan of Care Expiration: 6/3/24  Visit # / Visits authorized: 8/20 including eval  Foto: 2/3 (53)   Progress Note due 30 days from 4/30/24  Precautions: Standard, DMII  Surgery 3/14/24    Weeks 1-2     Immobilization: Sling     Lifting restriction: 3-5 lbs (can of soup), no overhead reaching/lifting x2 weeks     Activity/work limitations: No overuse/repetitive activity     Week 3-5     Therapy: Start at the beginning of week 3     Immobilization: none     Return to work/activity: Per guidance of Physical therapist     Time In: 10:04 am  Time Out: 11:10 am  Total Billable Time: 66 minutes including PT and tech one on one time    SUBJECTIVE     Today, pt reports:she is ready to try Dry needling  She was compliant with home exercise program.  Response to previous treatment: no new issues  Functional change: feeling a little better with ROM and less stiffness after working on her HEP.    Pre-Treatment Pain: 2/10    Location: R shoulder      OBJECTIVE   Trigger points noted in infraspinatus, lat, upper trap, mid deltoid, and teres major as well as subscapularis, pects, and bicep     TREATMENT   Pt signed consent to DN today    Yue received therapeutic exercises to develop strength, endurance, ROM, and flexibility for 15 minutes including:    Seated UBE for posterior scapular stability 5 min backwards only  Seated rows 40# with cues  on sternal lift and scapular depression 3x 10  Seated trunk rotation machine for t spine ROM 1x 20 24#     Deferred:  Open book training to engage posterior T spine rotators 2x 10    Yue received the following manual therapy techniques: Joint mobilizations, Manual traction, and Soft tissue Mobilization were applied to the: neck and R shoulder for 15 minutes, including:      Soft tissue mobs to infraspinatus, teres major, minor, lat, posterior and mid deltoid, upper trap and subscapularis and tricep  PT used palpation to determine DN placement        Yue participated in neuromuscular re-education activities to improve: Balance, Kinesthetic, and Proprioception for 30 minutes. The following activities were included:     Standing shoulder external rotation with yellow theracord 3x 10 R  Standing shoulder adduction with yellow theracord 3x 10 R  Standing shoulder internal rotation with yellow theracord 3x 10 R  Prone shoulder extension 2 x10 R  Prone shoulder T with retract/depress 2x 10 R    Deferred:  Prone scapular retraction 2x 10 5#  Supine scapular protraction 1x 10; add 5# 3x 10  Prone shoulder extension 1# 3x10 R  Prone T 3x 10 1# R  Seated external rotation with red theraband 3x 10  Seated W 3x 10 with red theraband  Seated med x  C spine machine 135# (2 inch seat) 3 min  Scapular posterior tipping with shoulder external rotation in supine 3x 10  Scapular posterior tipping with loading internal rotators 2# 2x 20 sec hold  Seated internal rotation with green theraband 3x 10  Bicep curls 4# 3x 10 B with cues on scapular retract/depression  3 rounds of  Seated hand slides on table top for scapular protraction 1x 10  Seated shoulder external rotation at table top 1x 10  Seated shoulder slides R to L abduction 1 x10  Seated shoulder flexion (arm in supination) 1x 10        Yue participated in dynamic functional therapeutic activities to improve functional performance for 0  minutes,  including:      Yue participated in gait training to improve functional mobility and safety for 0  minutes, including:      Yue received the following direct contact modalities after being cleared for contraindications:     Yue received the following supervised modalities after being cleared for contradictions:     Yue received hot pack for R shoulder 0 minutes to R shoulder and neck.    Yue received cold pack for 0 minutes to 0.  Yue received electrical stimulation NMES to the infraspinatus, mid deltoid, upper trap and lat for 6 minutes using frequency 6 on the  at a low setting      Home Exercises Provided and Patient Education Provided     Education/Self-Care provided: (during therex)  What To Expect After Functional Dry Needling ® Treatments           How will I feel after a session of FDN?    You may feel some soreness immediately after treatment in the area of the body you were treated. This does not always occur but should be expected and is considered normal. It can also take up to a few hours, or even until the next day, to feel an onset of soreness. The soreness may vary from person to person and based on the area of the body that was treated, but it typically feels like you had an intense workout at the gym. Soreness typically lasts 24-48 hours. Make sure to indicate to your provider at a follow-up appointment how long the soreness lasted.  Bruising from the treatment is possible, somehow uncommon, but it is not of concern. Some areas are more likely to bruise than others, including the shoulders, chest, face, and portions of the extremities. Large bruising rarely occurs, but is possible. Use ice to help decrease the bruising and if you feel concern, please call your provider.   It is common to feel tired/fatigued, energized, emotional, giggly, or out of it after treatment. This is a normal response that can last up to an hour or two after treatment. If this lasts beyond a  day, contact your provider as a precaution.  There are times when treatment may actually exacerbate your symptoms. This is normal and may indicate that you need to follow up sooner with your practitioner to continue treatment. If this continues past the 24-48 hour window, keep note of it, as this can help your provider adjust your treatment plan if needed based on your report. This does not mean that FDN cannot help your condition.    What should I do after my treatment and what is recommended?    We highly recommend increasing your water intake for the next 24 hours after treatment to help avoid or reduce soreness. We also recommend soaking in a hot bath or hot tub to help relieve post treatment soreness and to soften the symptoms associated with the treatment you received. After dry needling treatment, you may do the following based on your comfort level. Please note that if it hurts or exacerbates your symptoms, then discontinuing the activity is best.    Work out and/or stretch.  Participate in normal physical activity.  Massage the area.  Use heat or ice as preferred for post treatment soreness.  Stay hydrated.   If you have prescription medicines, continue to take them as prescribed.    What should I avoid after treatment?    Unfamiliar physical activities or sports.  Doing more than you normally do.  Excessive alcohol intake.    If you are feeling light headed, or experience difficulty breathing, chest pain, or any other concerning symptoms after treatment, call us immediately. If you are unable to get a hold of us, please call your physician.   Patient educated on the importance of improved core and upper and lower extremity strength in order to improve alignment of the spine and upper and lower extremities with static positions and dynamic movement.   Patient educated on the importance of strong core and lower extremity musculature in order to improve both static and dynamic balance, improve gait mechanics,  reduce fall risk and improve household and community mobility.       Written Home Exercises Provided: Patient instructed to cont prior HEP.  Exercises were reviewed and Yue was able to demonstrate them prior to the end of the session.  Yue demonstrated good  understanding of the education provided.     See EMR under Patient Instructions for exercises provided 4/1/2024.    ASSESSMENT   Pt tolerated therex and manual therapy with PT noted mildly gains in external rotation still limited by tight subscapularis. Pt is very tender to touch at the pect and subscapularis. Pt flexion is now full in supine but functionally she fires the lateral deltoid still limiting EROM and hiking mildly.  Pt demonstrated good understanding of exercises and required moderate cueing to maintain proper form.      Yue Is progressing well towards her goals.   Pt prognosis is Good.     Pt will continue to benefit from skilled outpatient physical therapy to address the deficits listed in the problem list box on initial evaluation, provide pt/family education and to maximize pt's level of independence in the home and community environment.     Pt's spiritual, cultural and educational needs considered and pt agreeable to plan of care and goals.     Anticipated barriers to physical therapy: none    Goals:   Short Term Goals: In 4 weeks   1.Pt to be educated on HEP. met  2.Patient to increase GH ROM to 150 degrees flexion or better. met  3.Patient to increase strength to 3+/5 with external rotation or better. progressing  4.Patient to have pain less than 5/10 or better at all times. progressing  5.Patient to improve score on the FOTO by 10%. met  6. Pt to be educated on postural and body mechanics awareness. progressing     Long Term Goals: In 10 weeks  1. Patient to improve score on the FOTO to 66.  2. Patient to demo increase in UE strength to 4/5 with flexion and rotation  3. Patient to have decreased pain to 2/10 at all times.  4.  Patient to demo increase GH ROM to 65 degrees or better with Internal rotation and reaching behind back to T9 or better  5. Patient to perform daily activities including dressing without help, lifting up to 5 # overhead and carrying up to 20 # or better in the R UE.        Plan   Plan of care Certification: 3/25/2024 to 6/3/24.     Outpatient Physical Therapy 2 times weekly for 10 weeks to include the following interventions: Cervical/Lumbar Traction, Electrical Stimulation IFC, NMES, Manual Therapy, Moist Heat/ Ice, Neuromuscular Re-ed, Patient Education, Self Care, Therapeutic Activities, Therapeutic Exercise, and DN.      Continue Plan of Care (POC) and progress per patient tolerance.    Nithya Hummel, PT, CIDN, SFMA

## 2024-05-28 ENCOUNTER — TELEPHONE (OUTPATIENT)
Dept: REHABILITATION | Facility: HOSPITAL | Age: 54
End: 2024-05-28
Payer: COMMERCIAL

## 2024-05-28 NOTE — TELEPHONE ENCOUNTER
PT called as pt misses at times due to work but today is feeling ill with stomach complications. PT reminded her of her Thurs appt.

## 2024-05-30 ENCOUNTER — CLINICAL SUPPORT (OUTPATIENT)
Dept: REHABILITATION | Facility: HOSPITAL | Age: 54
End: 2024-05-30
Payer: COMMERCIAL

## 2024-05-30 DIAGNOSIS — Z74.09 DECREASED FUNCTIONAL MOBILITY AND ENDURANCE: Primary | ICD-10-CM

## 2024-05-30 PROCEDURE — 97140 MANUAL THERAPY 1/> REGIONS: CPT | Mod: PN

## 2024-05-30 PROCEDURE — 97112 NEUROMUSCULAR REEDUCATION: CPT | Mod: PN

## 2024-05-30 PROCEDURE — 97110 THERAPEUTIC EXERCISES: CPT | Mod: PN

## 2024-05-30 NOTE — PROGRESS NOTES
Physical Therapy Daily Treatment Note and Plan of Care Update     Name: Yue Serrato  Clinic Number: [id number]    Therapy Diagnosis:   Encounter Diagnosis   Name Primary?    Decreased functional mobility and endurance Yes     Physician: Nilo Brito MD    Visit Date: 5/30/2024    Physician Orders: PT Eval and Treat   Medical Diagnosis from Referral:   M25.511,G89.29 (ICD-10-CM) - Chronic right shoulder pain   M75.31 (ICD-10-CM) - Calcific tendinitis of right shoulder   M67.911 (ICD-10-CM) - Tendinopathy of right rotator cuff      Evaluation Date: 3/25/2024  Authorization Period Expiration: 12/31/24  Plan of Care Expiration: 6/3/24 extend to 7/5/24  Visit # / Visits authorized: 9/20 including eval  Foto: 2/3 (53)   Progress Note due 30 days from 5/30/24  Precautions: Standard, DMII  Surgery 3/14/24    Weeks 1-2     Immobilization: Sling     Lifting restriction: 3-5 lbs (can of soup), no overhead reaching/lifting x2 weeks     Activity/work limitations: No overuse/repetitive activity     Week 3-5     Therapy: Start at the beginning of week 3     Immobilization: none     Return to work/activity: Per guidance of Physical therapist     Time In: 7:03 am  Time Out: 8:13 am  Total Billable Time: 70 minutes including PT and tech one on one time    SUBJECTIVE     Today, pt reports:she had a poor tolerance to dry needling and her deltoid is still very sore. She doesn't want to use DN again at this time.  Response to previous treatment: very sore  Functional change: feeling a little better with ROM and less stiffness after working on her HEP.    Pre-Treatment Pain: 2/10    Location: R shoulder      OBJECTIVE     Shoulder ROM at eval; today     Active/Passive Joint Range Right Left   Flexion 90; 170 160; 170   ABDuction 70; 170 175   External Rotation 40;55 at 0 and 85 at 90 today 65 at 0 and 70 at 90 abduction; 65 at 0 and 90 at 90 today   Internal Rotation 20; 45 Combined T10; 55;   Adduction 0;30 Pain still 30    Extension 15; 60 60;80      Pain with motions of internal rotation, flexion and abduction and abduction.     Cervical Spine AROM at eval; today:    Degrees Pain   Flexion 45;55 y   Extend 45; 60 y   R side bend 20; 40 y   L side bend 35;40 y   R rotation 55;85 y   L rotation 50;80 y      Strength at eval; today  Muscle (Myotome) Right Left   Cervical Deep neck Flexor hold time 12 sec/32 is normal  Today: 25     Shoulder Abduction 2; 4 4   Elbow Flexors (C5) 3+;4+ 4   Wrist Extensors (C6) 5 5   Elbow Extensors (C7) 4 5   ER (arm at side) 2+;4 5   IR (arm at side) 2+;4 5   Thumb extensors 5 5   Intrinsic strength good good   Mid trap 3+/5 and lower traps at 2+/5     Sensation: Intact to light touch     TREATMENT       Yue received therapeutic exercises to develop strength, endurance, ROM, and flexibility for 25 minutes including:    Seated UBE for posterior scapular stability 5 min backwards only  Supine cervical flexion 2x 10  Seated shoulder internal/external rotation  Seated rows 40# with cues on sternal lift and scapular depression 3x 10       Deferred:  Open book training to engage posterior T spine rotators 2x 10    Yue received the following manual therapy techniques: Joint mobilizations, Manual traction, and Soft tissue Mobilization were applied to the: neck and R shoulder for 15 minutes, including:      Soft tissue mobs to infraspinatus, teres major, minor, lat, posterior and mid deltoid, upper trap subscapularis  AP glides to humerus for posterior capsule mobility and PA for anterior as well as superior to inferior for inferior grade I-III      Yue participated in neuromuscular re-education activities to improve: Balance, Kinesthetic, and Proprioception for 30  minutes. The following activities were included:     Med x neck machine 132 #4 min  Med lumbar extension 60 # 1x 20  Med x rotation 30 # 1x 20 setting 3  Supine scapular protraction standing 2x 5 (hard to coordinate)  Supine scapular  protraction 2x 10 with manual cues to assist and direct to reduce hiking and improve scapular retraction at recovery; add 5# 2x 10 each arm as both have tight subscapularis  After manual therapy:  Seated shoulder internal rotation with green theracord 3x 12 R  Prone T R 3x 10    Deferred:  Standing shoulder external rotation with yellow theracord 3x 10 R  Standing shoulder adduction with yellow theracord 3x 10 R  Prone shoulder extension 2 x10 R  Prone shoulder T with retract/depress 2x 10 R  Seated external rotation with red theraband 3x 10  Seated W 3x 10 with red theraband  Scapular posterior tipping with shoulder external rotation in supine 3x 10  Scapular posterior tipping with loading internal rotators 2# 2x 20 sec hold  Bicep curls 4# 3x 10 B with cues on scapular retract/depression      Yue participated in dynamic functional therapeutic activities to improve functional performance for 0  minutes, including:      Yue participated in gait training to improve functional mobility and safety for 0  minutes, including:      Yue received the following direct contact modalities after being cleared for contraindications:     Yue received the following supervised modalities after being cleared for contradictions:     Yue received hot pack for R shoulder 0 minutes to R shoulder and neck.    Yue received cold pack for 0 minutes to 0.  Yue received electrical stimulation NMES to the infraspinatus, mid deltoid, upper trap and lat for 6 minutes using frequency 6 on the  at a low setting      Home Exercises Provided and Patient Education Provided     Education/Self-Care provided: (during therex)  What To Expect After Functional Dry Needling ® Treatments           How will I feel after a session of FDN?    You may feel some soreness immediately after treatment in the area of the body you were treated. This does not always occur but should be expected and is considered normal. It can also  take up to a few hours, or even until the next day, to feel an onset of soreness. The soreness may vary from person to person and based on the area of the body that was treated, but it typically feels like you had an intense workout at the gym. Soreness typically lasts 24-48 hours. Make sure to indicate to your provider at a follow-up appointment how long the soreness lasted.  Bruising from the treatment is possible, somehow uncommon, but it is not of concern. Some areas are more likely to bruise than others, including the shoulders, chest, face, and portions of the extremities. Large bruising rarely occurs, but is possible. Use ice to help decrease the bruising and if you feel concern, please call your provider.   It is common to feel tired/fatigued, energized, emotional, giggly, or out of it after treatment. This is a normal response that can last up to an hour or two after treatment. If this lasts beyond a day, contact your provider as a precaution.  There are times when treatment may actually exacerbate your symptoms. This is normal and may indicate that you need to follow up sooner with your practitioner to continue treatment. If this continues past the 24-48 hour window, keep note of it, as this can help your provider adjust your treatment plan if needed based on your report. This does not mean that FDN cannot help your condition.    What should I do after my treatment and what is recommended?    We highly recommend increasing your water intake for the next 24 hours after treatment to help avoid or reduce soreness. We also recommend soaking in a hot bath or hot tub to help relieve post treatment soreness and to soften the symptoms associated with the treatment you received. After dry needling treatment, you may do the following based on your comfort level. Please note that if it hurts or exacerbates your symptoms, then discontinuing the activity is best.    Work out and/or stretch.  Participate in normal  physical activity.  Massage the area.  Use heat or ice as preferred for post treatment soreness.  Stay hydrated.   If you have prescription medicines, continue to take them as prescribed.    What should I avoid after treatment?    Unfamiliar physical activities or sports.  Doing more than you normally do.  Excessive alcohol intake.    If you are feeling light headed, or experience difficulty breathing, chest pain, or any other concerning symptoms after treatment, call us immediately. If you are unable to get a hold of us, please call your physician.   Patient educated on the importance of improved core and upper and lower extremity strength in order to improve alignment of the spine and upper and lower extremities with static positions and dynamic movement.   Patient educated on the importance of strong core and lower extremity musculature in order to improve both static and dynamic balance, improve gait mechanics, reduce fall risk and improve household and community mobility.       Written Home Exercises Provided: Patient instructed to cont prior HEP.  Exercises were reviewed and Yue was able to demonstrate them prior to the end of the session.  Yue demonstrated good  understanding of the education provided.     See EMR under Patient Instructions for exercises provided 4/1/2024.    ASSESSMENT   Pt tolerated subscapularis and teres major are still very tender and rigid. Pt needs considerable gains to increase rotation at the shoulder but otherwise is advancing well. Serratus fatigues quickly and scapular mechanics are still upper trap and deltoid dominant. PT to continue with motor control training as well as manual therapy for scapular humeral mobility. Pt really could benefit from DN to the subscap and teres and lat and pect but has declined for now still so PT to use eccentric loading as tolerated to help gain motor control. PT discussed poor consistent attendance is limiting us but she states she will  try to be more consistent as she has a pay plan set up. Pt demonstrated good understanding of exercises and required moderate cueing to maintain proper form.      Yue Is progressing well towards her goals.   Pt prognosis is Good.     Pt will continue to benefit from skilled outpatient physical therapy to address the deficits listed in the problem list box on initial evaluation, provide pt/family education and to maximize pt's level of independence in the home and community environment.     Pt's spiritual, cultural and educational needs considered and pt agreeable to plan of care and goals.     Anticipated barriers to physical therapy: none    Goals:   Short Term Goals: In 4 weeks   1.Pt to be educated on HEP. met  2.Patient to increase GH ROM to 150 degrees flexion or better. met  3.Patient to increase strength to 3+/5 with external rotation or better. met  4.Patient to have pain less than 5/10 or better at all times. met  5.Patient to improve score on the FOTO by 10%. met  6. Pt to be educated on postural and body mechanics awareness. progressing     Long Term Goals: In 10 weeks  1. Patient to improve score on the FOTO to 66. To be assessed  2. Patient to demo increase in UE strength to 4/5 with flexion and rotation. met  3. Patient to have decreased pain to 2/10 at all times. progressing  4. Patient to demo increase GH ROM to 65 degrees or better with Internal rotation and reaching behind back to T9 or better. progressing  5. Patient to perform daily activities including dressing without help, lifting up to 5 # overhead and carrying up to 20 # or better in the R UE. progressing        Plan   Plan of care Certification: 3/25/2024 to 6/3/24 extend to 7/5/24     Outpatient Physical Therapy 2 times weekly for 5 weeks (effective week of 5/30/24) to include the following interventions: Cervical/Lumbar Traction, Electrical Stimulation IFC, NMES, Manual Therapy, Moist Heat/ Ice, Neuromuscular Re-ed, Patient Education,  Self Care, Therapeutic Activities, Therapeutic Exercise, and DN.      Continue Plan of Care (POC) and progress per patient tolerance.    Nithya Hummel, PT, CIDN, SFMA

## 2024-05-30 NOTE — PLAN OF CARE
Physical Therapy Daily Treatment Note and Plan of Care Update     Name: Yue Serrato  Clinic Number: [id number]    Therapy Diagnosis:   Encounter Diagnosis   Name Primary?    Decreased functional mobility and endurance Yes     Physician: Nilo Brito MD    Visit Date: 5/30/2024    Physician Orders: PT Eval and Treat   Medical Diagnosis from Referral:   M25.511,G89.29 (ICD-10-CM) - Chronic right shoulder pain   M75.31 (ICD-10-CM) - Calcific tendinitis of right shoulder   M67.911 (ICD-10-CM) - Tendinopathy of right rotator cuff      Evaluation Date: 3/25/2024  Authorization Period Expiration: 12/31/24  Plan of Care Expiration: 6/3/24 extend to 7/5/24  Visit # / Visits authorized: 9/20 including eval  Foto: 2/3 (53)   Progress Note due 30 days from 5/30/24  Precautions: Standard, DMII  Surgery 3/14/24    Weeks 1-2     Immobilization: Sling     Lifting restriction: 3-5 lbs (can of soup), no overhead reaching/lifting x2 weeks     Activity/work limitations: No overuse/repetitive activity     Week 3-5     Therapy: Start at the beginning of week 3     Immobilization: none     Return to work/activity: Per guidance of Physical therapist     Time In: 7:03 am  Time Out: 8:13 am  Total Billable Time: 70 minutes including PT and tech one on one time    SUBJECTIVE     Today, pt reports:she had a poor tolerance to dry needling and her deltoid is still very sore. She doesn't want to use DN again at this time.  Response to previous treatment: very sore  Functional change: feeling a little better with ROM and less stiffness after working on her HEP.    Pre-Treatment Pain: 2/10    Location: R shoulder      OBJECTIVE     Shoulder ROM at eval; today     Active/Passive Joint Range Right Left   Flexion 90; 170 160; 170   ABDuction 70; 170 175   External Rotation 40;55 at 0 and 85 at 90 today 65 at 0 and 70 at 90 abduction; 65 at 0 and 90 at 90 today   Internal Rotation 20; 45 Combined T10; 55;   Adduction 0;30 Pain still 30    Extension 15; 60 60;80      Pain with motions of internal rotation, flexion and abduction and abduction.     Cervical Spine AROM at eval; today:    Degrees Pain   Flexion 45;55 y   Extend 45; 60 y   R side bend 20; 40 y   L side bend 35;40 y   R rotation 55;85 y   L rotation 50;80 y      Strength at eval; today  Muscle (Myotome) Right Left   Cervical Deep neck Flexor hold time 12 sec/32 is normal  Today: 25     Shoulder Abduction 2; 4 4   Elbow Flexors (C5) 3+;4+ 4   Wrist Extensors (C6) 5 5   Elbow Extensors (C7) 4 5   ER (arm at side) 2+;4 5   IR (arm at side) 2+;4 5   Thumb extensors 5 5   Intrinsic strength good good   Mid trap 3+/5 and lower traps at 2+/5     Sensation: Intact to light touch     TREATMENT       Yue received therapeutic exercises to develop strength, endurance, ROM, and flexibility for 25 minutes including:    Seated UBE for posterior scapular stability 5 min backwards only  Supine cervical flexion 2x 10  Seated shoulder internal/external rotation  Seated rows 40# with cues on sternal lift and scapular depression 3x 10       Deferred:  Open book training to engage posterior T spine rotators 2x 10    Yue received the following manual therapy techniques: Joint mobilizations, Manual traction, and Soft tissue Mobilization were applied to the: neck and R shoulder for 15 minutes, including:      Soft tissue mobs to infraspinatus, teres major, minor, lat, posterior and mid deltoid, upper trap subscapularis  AP glides to humerus for posterior capsule mobility and PA for anterior as well as superior to inferior for inferior grade I-III      Yue participated in neuromuscular re-education activities to improve: Balance, Kinesthetic, and Proprioception for 30  minutes. The following activities were included:     Med x neck machine 132 #4 min  Med lumbar extension 60 # 1x 20  Med x rotation 30 # 1x 20 setting 3  Supine scapular protraction standing 2x 5 (hard to coordinate)  Supine scapular  protraction 2x 10 with manual cues to assist and direct to reduce hiking and improve scapular retraction at recovery; add 5# 2x 10 each arm as both have tight subscapularis  After manual therapy:  Seated shoulder internal rotation with green theracord 3x 12 R  Prone T R 3x 10    Deferred:  Standing shoulder external rotation with yellow theracord 3x 10 R  Standing shoulder adduction with yellow theracord 3x 10 R  Prone shoulder extension 2 x10 R  Prone shoulder T with retract/depress 2x 10 R  Seated external rotation with red theraband 3x 10  Seated W 3x 10 with red theraband  Scapular posterior tipping with shoulder external rotation in supine 3x 10  Scapular posterior tipping with loading internal rotators 2# 2x 20 sec hold  Bicep curls 4# 3x 10 B with cues on scapular retract/depression      Yue participated in dynamic functional therapeutic activities to improve functional performance for 0  minutes, including:      Yue participated in gait training to improve functional mobility and safety for 0  minutes, including:      Yue received the following direct contact modalities after being cleared for contraindications:     Yue received the following supervised modalities after being cleared for contradictions:     Yue received hot pack for R shoulder 0 minutes to R shoulder and neck.    Yue received cold pack for 0 minutes to 0.  Yue received electrical stimulation NMES to the infraspinatus, mid deltoid, upper trap and lat for 6 minutes using frequency 6 on the  at a low setting      Home Exercises Provided and Patient Education Provided     Education/Self-Care provided: (during therex)  What To Expect After Functional Dry Needling ® Treatments           How will I feel after a session of FDN?    You may feel some soreness immediately after treatment in the area of the body you were treated. This does not always occur but should be expected and is considered normal. It can also  take up to a few hours, or even until the next day, to feel an onset of soreness. The soreness may vary from person to person and based on the area of the body that was treated, but it typically feels like you had an intense workout at the gym. Soreness typically lasts 24-48 hours. Make sure to indicate to your provider at a follow-up appointment how long the soreness lasted.  Bruising from the treatment is possible, somehow uncommon, but it is not of concern. Some areas are more likely to bruise than others, including the shoulders, chest, face, and portions of the extremities. Large bruising rarely occurs, but is possible. Use ice to help decrease the bruising and if you feel concern, please call your provider.   It is common to feel tired/fatigued, energized, emotional, giggly, or out of it after treatment. This is a normal response that can last up to an hour or two after treatment. If this lasts beyond a day, contact your provider as a precaution.  There are times when treatment may actually exacerbate your symptoms. This is normal and may indicate that you need to follow up sooner with your practitioner to continue treatment. If this continues past the 24-48 hour window, keep note of it, as this can help your provider adjust your treatment plan if needed based on your report. This does not mean that FDN cannot help your condition.    What should I do after my treatment and what is recommended?    We highly recommend increasing your water intake for the next 24 hours after treatment to help avoid or reduce soreness. We also recommend soaking in a hot bath or hot tub to help relieve post treatment soreness and to soften the symptoms associated with the treatment you received. After dry needling treatment, you may do the following based on your comfort level. Please note that if it hurts or exacerbates your symptoms, then discontinuing the activity is best.    Work out and/or stretch.  Participate in normal  physical activity.  Massage the area.  Use heat or ice as preferred for post treatment soreness.  Stay hydrated.   If you have prescription medicines, continue to take them as prescribed.    What should I avoid after treatment?    Unfamiliar physical activities or sports.  Doing more than you normally do.  Excessive alcohol intake.    If you are feeling light headed, or experience difficulty breathing, chest pain, or any other concerning symptoms after treatment, call us immediately. If you are unable to get a hold of us, please call your physician.   Patient educated on the importance of improved core and upper and lower extremity strength in order to improve alignment of the spine and upper and lower extremities with static positions and dynamic movement.   Patient educated on the importance of strong core and lower extremity musculature in order to improve both static and dynamic balance, improve gait mechanics, reduce fall risk and improve household and community mobility.       Written Home Exercises Provided: Patient instructed to cont prior HEP.  Exercises were reviewed and Yue was able to demonstrate them prior to the end of the session.  Yue demonstrated good  understanding of the education provided.     See EMR under Patient Instructions for exercises provided 4/1/2024.    ASSESSMENT   Pt tolerated subscapularis and teres major are still very tender and rigid. Pt needs considerable gains to increase rotation at the shoulder but otherwise is advancing well. Serratus fatigues quickly and scapular mechanics are still upper trap and deltoid dominant. PT to continue with motor control training as well as manual therapy for scapular humeral mobility. Pt really could benefit from DN to the subscap and teres and lat and pect but has declined for now still so PT to use eccentric loading as tolerated to help gain motor control. PT discussed poor consistent attendance is limiting us but she states she will  try to be more consistent as she has a pay plan set up. Pt demonstrated good understanding of exercises and required moderate cueing to maintain proper form.      Yue Is progressing well towards her goals.   Pt prognosis is Good.     Pt will continue to benefit from skilled outpatient physical therapy to address the deficits listed in the problem list box on initial evaluation, provide pt/family education and to maximize pt's level of independence in the home and community environment.     Pt's spiritual, cultural and educational needs considered and pt agreeable to plan of care and goals.     Anticipated barriers to physical therapy: none    Goals:   Short Term Goals: In 4 weeks   1.Pt to be educated on HEP. met  2.Patient to increase GH ROM to 150 degrees flexion or better. met  3.Patient to increase strength to 3+/5 with external rotation or better. met  4.Patient to have pain less than 5/10 or better at all times. met  5.Patient to improve score on the FOTO by 10%. met  6. Pt to be educated on postural and body mechanics awareness. progressing     Long Term Goals: In 10 weeks  1. Patient to improve score on the FOTO to 66. To be assessed  2. Patient to demo increase in UE strength to 4/5 with flexion and rotation. met  3. Patient to have decreased pain to 2/10 at all times. progressing  4. Patient to demo increase GH ROM to 65 degrees or better with Internal rotation and reaching behind back to T9 or better. progressing  5. Patient to perform daily activities including dressing without help, lifting up to 5 # overhead and carrying up to 20 # or better in the R UE. progressing        Plan   Plan of care Certification: 3/25/2024 to 6/3/24 extend to 7/5/24     Outpatient Physical Therapy 2 times weekly for 5 weeks (effective week of 5/30/24) to include the following interventions: Cervical/Lumbar Traction, Electrical Stimulation IFC, NMES, Manual Therapy, Moist Heat/ Ice, Neuromuscular Re-ed, Patient Education,  Self Care, Therapeutic Activities, Therapeutic Exercise, and DN.      Continue Plan of Care (POC) and progress per patient tolerance.    Nithya Hummel, PT, CIDN, SFMA

## 2024-06-03 ENCOUNTER — CLINICAL SUPPORT (OUTPATIENT)
Dept: REHABILITATION | Facility: HOSPITAL | Age: 54
End: 2024-06-03
Payer: COMMERCIAL

## 2024-06-03 DIAGNOSIS — M25.611 DECREASED RANGE OF MOTION OF RIGHT SHOULDER: ICD-10-CM

## 2024-06-03 DIAGNOSIS — Z74.09 DECREASED FUNCTIONAL MOBILITY AND ENDURANCE: Primary | ICD-10-CM

## 2024-06-03 PROCEDURE — 97110 THERAPEUTIC EXERCISES: CPT | Mod: PN

## 2024-06-03 PROCEDURE — 97112 NEUROMUSCULAR REEDUCATION: CPT | Mod: PN

## 2024-06-03 NOTE — PROGRESS NOTES
Physical Therapy Daily Treatment Note     Name: Yue Serrato  Clinic Number: [id number]    Therapy Diagnosis:   Encounter Diagnoses   Name Primary?    Decreased functional mobility and endurance Yes    Decreased range of motion of right shoulder      Physician: Nilo Brito MD    Visit Date: 6/3/2024    Physician Orders: PT Eval and Treat   Medical Diagnosis from Referral:   M25.511,G89.29 (ICD-10-CM) - Chronic right shoulder pain   M75.31 (ICD-10-CM) - Calcific tendinitis of right shoulder   M67.911 (ICD-10-CM) - Tendinopathy of right rotator cuff      Evaluation Date: 3/25/2024  Authorization Period Expiration: 12/31/24  Plan of Care Expiration: 6/3/24 extend to 7/5/24  Visit # / Visits authorized: 10/20 including eval  Foto: 2/3 (53)   Progress Note due 30 days from 5/30/24  Precautions: Standard, DMII  Surgery 3/14/24    Weeks 1-2     Immobilization: Sling     Lifting restriction: 3-5 lbs (can of soup), no overhead reaching/lifting x2 weeks     Activity/work limitations: No overuse/repetitive activity     Week 3-5     Therapy: Start at the beginning of week 3     Immobilization: none     Return to work/activity: Per guidance of Physical therapist     Time In: 7:04 am  Time Out: 8:00 am  Total Billable Time: 40 minutes including PT and tech one on one time    SUBJECTIVE     Today, pt reports:still pretty sore with therex but still declining DN    Response to previous treatment: no new issues  Functional change: feeling a little better with ROM and less stiffness after working on her HEP.    Pre-Treatment Pain: 2/10    Location: R shoulder      OBJECTIVE       Sensation: Intact to light touch     TREATMENT       Yue received therapeutic exercises to develop strength, endurance, ROM, and flexibility for 18 minutes including:    Seated UBE for posterior scapular stability 5 min backwards only  Seated rows 40# with cues on sternal lift and scapular depression 4x 10  Foam rolling T spine extension 2x  10  Foam rolling R side lying for lat 2 min    Deferred:  Supine cervical flexion 2x 10  Open book training to engage posterior T spine rotators 2x 10    Yue received the following manual therapy techniques: Joint mobilizations, Manual traction, and Soft tissue Mobilization were applied to the: neck and R shoulder for 0 minutes, including:    Deferred:  Soft tissue mobs to infraspinatus, teres major, minor, lat, posterior and mid deltoid, upper trap subscapularis  AP glides to humerus for posterior capsule mobility and PA for anterior as well as superior to inferior for inferior grade I-III      Yue participated in neuromuscular re-education activities to improve: Balance, Kinesthetic, and Proprioception for 38  minutes. The following activities were included:     Prone T 2x 10; add 2# 2x 10 with cues on depression/retraction and posterior scapular tipping  Scapular push aways from standing at plinth 3x 10  Wall push up 3x 10 max cues to avoid hiking and to retract  Med lumbar extension 64 # 1x 20 0-55  Med x neck machine 141# 3 min  Standing shoulder external rotation with red theracord 1x 10 R  Standing shoulder adduction with red theracord 1x 10 R  Standing shoulder internal rotation with red theracord 1x 10 R  Standing shoulder adduction with red theracord 1x 10 R    Deferred:  Med x rotation 30 # 1x 20 setting 3  Supine scapular protraction standing 2x 5 (hard to coordinate)  Supine scapular protraction 2x 10 with manual cues to assist and direct to reduce hiking and improve scapular retraction at recovery; add 5# 2x 10 each arm as both have tight subscapularis  Prone shoulder extension 2 x10 R  Seated W 3x 10 with red theraband  Scapular posterior tipping with shoulder external rotation in supine 3x 10  Scapular posterior tipping with loading internal rotators 2# 2x 20 sec hold  Bicep curls 4# 3x 10 B with cues on scapular retract/depression      Yue participated in dynamic functional therapeutic  activities to improve functional performance for 0  minutes, including:      Yue participated in gait training to improve functional mobility and safety for 0  minutes, including:      Yue received the following direct contact modalities after being cleared for contraindications:     Yue received the following supervised modalities after being cleared for contradictions:     Yue received hot pack for R shoulder 0 minutes to R shoulder and neck.    Yue received cold pack for 0 minutes to 0.  Yue received electrical stimulation NMES to the infraspinatus, mid deltoid, upper trap and lat for 6 minutes using frequency 6 on the  at a low setting      Home Exercises Provided and Patient Education Provided     Education/Self-Care provided: (during therex)  What To Expect After Functional Dry Needling ® Treatments           How will I feel after a session of FDN?    You may feel some soreness immediately after treatment in the area of the body you were treated. This does not always occur but should be expected and is considered normal. It can also take up to a few hours, or even until the next day, to feel an onset of soreness. The soreness may vary from person to person and based on the area of the body that was treated, but it typically feels like you had an intense workout at the gym. Soreness typically lasts 24-48 hours. Make sure to indicate to your provider at a follow-up appointment how long the soreness lasted.  Bruising from the treatment is possible, somehow uncommon, but it is not of concern. Some areas are more likely to bruise than others, including the shoulders, chest, face, and portions of the extremities. Large bruising rarely occurs, but is possible. Use ice to help decrease the bruising and if you feel concern, please call your provider.   It is common to feel tired/fatigued, energized, emotional, giggly, or out of it after treatment. This is a normal response that can last  up to an hour or two after treatment. If this lasts beyond a day, contact your provider as a precaution.  There are times when treatment may actually exacerbate your symptoms. This is normal and may indicate that you need to follow up sooner with your practitioner to continue treatment. If this continues past the 24-48 hour window, keep note of it, as this can help your provider adjust your treatment plan if needed based on your report. This does not mean that FDN cannot help your condition.    What should I do after my treatment and what is recommended?    We highly recommend increasing your water intake for the next 24 hours after treatment to help avoid or reduce soreness. We also recommend soaking in a hot bath or hot tub to help relieve post treatment soreness and to soften the symptoms associated with the treatment you received. After dry needling treatment, you may do the following based on your comfort level. Please note that if it hurts or exacerbates your symptoms, then discontinuing the activity is best.    Work out and/or stretch.  Participate in normal physical activity.  Massage the area.  Use heat or ice as preferred for post treatment soreness.  Stay hydrated.   If you have prescription medicines, continue to take them as prescribed.    What should I avoid after treatment?    Unfamiliar physical activities or sports.  Doing more than you normally do.  Excessive alcohol intake.    If you are feeling light headed, or experience difficulty breathing, chest pain, or any other concerning symptoms after treatment, call us immediately. If you are unable to get a hold of us, please call your physician.   Patient educated on the importance of improved core and upper and lower extremity strength in order to improve alignment of the spine and upper and lower extremities with static positions and dynamic movement.   Patient educated on the importance of strong core and lower extremity musculature in order to  improve both static and dynamic balance, improve gait mechanics, reduce fall risk and improve household and community mobility.       Written Home Exercises Provided: Patient instructed to cont prior HEP.  Exercises were reviewed and Yue was able to demonstrate them prior to the end of the session.  Yue demonstrated good  understanding of the education provided.     See EMR under Patient Instructions for exercises provided 4/1/2024.    ASSESSMENT   PT continued to note trigger points and shoulder hiking but pt declined DN still. PT continued to work on teaching home motor control tasks to increase scapular posterior tipping and depression. PT increased load on the serratus and subscap work to reduce tension. Pt demonstrated good understanding of exercises and required moderate cueing to maintain proper form.      Yue Is progressing well towards her goals.   Pt prognosis is Good.     Pt will continue to benefit from skilled outpatient physical therapy to address the deficits listed in the problem list box on initial evaluation, provide pt/family education and to maximize pt's level of independence in the home and community environment.     Pt's spiritual, cultural and educational needs considered and pt agreeable to plan of care and goals.     Anticipated barriers to physical therapy: none    Goals:   Short Term Goals: In 4 weeks   1.Pt to be educated on HEP. met  2.Patient to increase GH ROM to 150 degrees flexion or better. met  3.Patient to increase strength to 3+/5 with external rotation or better. met  4.Patient to have pain less than 5/10 or better at all times. met  5.Patient to improve score on the FOTO by 10%. met  6. Pt to be educated on postural and body mechanics awareness. progressing     Long Term Goals: In 10 weeks to 7/5/24  1. Patient to improve score on the FOTO to 66. To be assessed  2. Patient to demo increase in UE strength to 4/5 with flexion and rotation. met  3. Patient to have  decreased pain to 2/10 at all times. progressing  4. Patient to demo increase GH ROM to 65 degrees or better with Internal rotation and reaching behind back to T9 or better. progressing  5. Patient to perform daily activities including dressing without help, lifting up to 5 # overhead and carrying up to 20 # or better in the R UE. progressing        Plan   Plan of care Certification: 3/25/2024 to 7/5/24     Outpatient Physical Therapy 2 times weekly for 5 weeks (effective week of 5/30/24) to include the following interventions: Cervical/Lumbar Traction, Electrical Stimulation IFC, NMES, Manual Therapy, Moist Heat/ Ice, Neuromuscular Re-ed, Patient Education, Self Care, Therapeutic Activities, Therapeutic Exercise, and DN.      Continue Plan of Care (POC) and progress per patient tolerance.    Nithya Hummel, PT, CIDN, SFMA

## 2024-06-05 ENCOUNTER — CLINICAL SUPPORT (OUTPATIENT)
Dept: REHABILITATION | Facility: HOSPITAL | Age: 54
End: 2024-06-05
Payer: COMMERCIAL

## 2024-06-05 DIAGNOSIS — Z74.09 DECREASED FUNCTIONAL MOBILITY AND ENDURANCE: Primary | ICD-10-CM

## 2024-06-05 PROCEDURE — 97112 NEUROMUSCULAR REEDUCATION: CPT | Mod: PN

## 2024-06-05 PROCEDURE — 97140 MANUAL THERAPY 1/> REGIONS: CPT | Mod: PN

## 2024-06-05 PROCEDURE — 97110 THERAPEUTIC EXERCISES: CPT | Mod: PN

## 2024-06-05 NOTE — PROGRESS NOTES
Physical Therapy Daily Treatment Note     Name: Yue Serrato  Clinic Number: [id number]    Therapy Diagnosis:   Encounter Diagnosis   Name Primary?    Decreased functional mobility and endurance Yes     Physician: Nilo Brito MD    Visit Date: 6/5/2024    Physician Orders: PT Eval and Treat   Medical Diagnosis from Referral:   M25.511,G89.29 (ICD-10-CM) - Chronic right shoulder pain   M75.31 (ICD-10-CM) - Calcific tendinitis of right shoulder   M67.911 (ICD-10-CM) - Tendinopathy of right rotator cuff      Evaluation Date: 3/25/2024  Authorization Period Expiration: 12/31/24  Plan of Care Expiration: 6/3/24 extend to 7/5/24  Visit # / Visits authorized: 11/20 including eval  Foto: 2/3 (53)   Progress Note due 30 days from 5/30/24  Precautions: Standard, DMII  Surgery 3/14/24    Weeks 1-2     Immobilization: Sling     Lifting restriction: 3-5 lbs (can of soup), no overhead reaching/lifting x2 weeks     Activity/work limitations: No overuse/repetitive activity     Week 3-5     Therapy: Start at the beginning of week 3     Immobilization: none     Return to work/activity: Per guidance of Physical therapist     Time In: 7:03 am  Time Out: 8:00 am  Total Billable Time: 57 minutes including PT and tech one on one time    SUBJECTIVE     Today, pt reports:still pretty sore with therex but still declining DN    Response to previous treatment: no new issues  Functional change: feeling a little better with ROM and less stiffness after working on her HEP.    Pre-Treatment Pain: 2/10    Location: R shoulder      OBJECTIVE       Sensation: Intact to light touch     TREATMENT       Yue received therapeutic exercises to develop strength, endurance, ROM, and flexibility for 16 minutes including:    Seated UBE for posterior scapular stability 4 min backwards only  Seated rows 40# with cues on sternal lift and scapular depression 4x 10  Foam rolling T spine extension 1x 10; add elbow out wide and squeeze in for 1x  10  Foam rolling R side lying for lat 2 min    Deferred:  Supine cervical flexion 2x 10  Open book training to engage posterior T spine rotators 2x 10    Yue received the following manual therapy techniques: Joint mobilizations, Manual traction, and Soft tissue Mobilization were applied to the: neck and R shoulder for 15 minutes, including:    GH mobs for superior  and Posterior and anterior capsule mobility grade II-III  Pect minor and major mobility work  Soft tissue to the multiple bicep trigger points    Deferred:  Soft tissue mobs to infraspinatus, teres major, minor, lat, posterior and mid deltoid, upper trap subscapularis  AP glides to humerus for posterior capsule mobility and PA for anterior as well as superior to inferior for inferior grade I-III      Yue participated in neuromuscular re-education activities to improve: Balance, Kinesthetic, and Proprioception for 26  minutes. The following activities were included:    Wall push up 3x 10 max cues to avoid hiking and to retract  Med lumbar extension 64 # 1x 20 0-55  Standing shoulder external rotation with red theracord 1x 10 R  Standing shoulder adduction with red theracord 1x 10 R  Standing shoulder internal rotation with red theracord 1x 10 R  Scapular push aways from standing at plinth 3x 10  After manual therapy   Overhead shoulder shrugs R 1x 10  Med x neck machine 141# 3 min      Deferred:    Prone T 2x 10; add 2# 2x 10 with cues on depression/retraction and posterior scapular tipping  Med x rotation 30 # 1x 20 setting 3  Supine scapular protraction standing 2x 5 (hard to coordinate)  Supine scapular protraction 2x 10 with manual cues to assist and direct to reduce hiking and improve scapular retraction at recovery; add 5# 2x 10 each arm as both have tight subscapularis  Prone shoulder extension 2 x10 R  Seated W 3x 10 with red theraband  Scapular posterior tipping with shoulder external rotation in supine 3x 10  Scapular posterior tipping  with loading internal rotators 2# 2x 20 sec hold  Bicep curls 4# 3x 10 B with cues on scapular retract/depression      Yue participated in dynamic functional therapeutic activities to improve functional performance for 0  minutes, including:      Yue participated in gait training to improve functional mobility and safety for 0  minutes, including:      Yue received the following direct contact modalities after being cleared for contraindications:     Yue received the following supervised modalities after being cleared for contradictions:     Yue received hot pack for R shoulder 0 minutes to R shoulder and neck.    Yue received cold pack for 0 minutes to 0.  Yue received electrical stimulation NMES to the infraspinatus, mid deltoid, upper trap and lat for 0 minutes using frequency 6 on the  at a low setting      Home Exercises Provided and Patient Education Provided     Education/Self-Care provided: (during therex)  What To Expect After Functional Dry Needling ® Treatments           How will I feel after a session of FDN?    You may feel some soreness immediately after treatment in the area of the body you were treated. This does not always occur but should be expected and is considered normal. It can also take up to a few hours, or even until the next day, to feel an onset of soreness. The soreness may vary from person to person and based on the area of the body that was treated, but it typically feels like you had an intense workout at the gym. Soreness typically lasts 24-48 hours. Make sure to indicate to your provider at a follow-up appointment how long the soreness lasted.  Bruising from the treatment is possible, somehow uncommon, but it is not of concern. Some areas are more likely to bruise than others, including the shoulders, chest, face, and portions of the extremities. Large bruising rarely occurs, but is possible. Use ice to help decrease the bruising and if you feel  concern, please call your provider.   It is common to feel tired/fatigued, energized, emotional, giggly, or out of it after treatment. This is a normal response that can last up to an hour or two after treatment. If this lasts beyond a day, contact your provider as a precaution.  There are times when treatment may actually exacerbate your symptoms. This is normal and may indicate that you need to follow up sooner with your practitioner to continue treatment. If this continues past the 24-48 hour window, keep note of it, as this can help your provider adjust your treatment plan if needed based on your report. This does not mean that FDN cannot help your condition.    What should I do after my treatment and what is recommended?    We highly recommend increasing your water intake for the next 24 hours after treatment to help avoid or reduce soreness. We also recommend soaking in a hot bath or hot tub to help relieve post treatment soreness and to soften the symptoms associated with the treatment you received. After dry needling treatment, you may do the following based on your comfort level. Please note that if it hurts or exacerbates your symptoms, then discontinuing the activity is best.    Work out and/or stretch.  Participate in normal physical activity.  Massage the area.  Use heat or ice as preferred for post treatment soreness.  Stay hydrated.   If you have prescription medicines, continue to take them as prescribed.    What should I avoid after treatment?    Unfamiliar physical activities or sports.  Doing more than you normally do.  Excessive alcohol intake.    If you are feeling light headed, or experience difficulty breathing, chest pain, or any other concerning symptoms after treatment, call us immediately. If you are unable to get a hold of us, please call your physician.   Patient educated on the importance of improved core and upper and lower extremity strength in order to improve alignment of the  spine and upper and lower extremities with static positions and dynamic movement.   Patient educated on the importance of strong core and lower extremity musculature in order to improve both static and dynamic balance, improve gait mechanics, reduce fall risk and improve household and community mobility.       Written Home Exercises Provided: Patient instructed to cont prior HEP.  Exercises were reviewed and Yue was able to demonstrate them prior to the end of the session.  Yue demonstrated good  understanding of the education provided.     See EMR under Patient Instructions for exercises provided 4/1/2024.    ASSESSMENT   PT continued to note trigger points in the bicep that were severe. Pt still declined DN so PT used manual therapy and will consider adding AISTM to assist.  Pt demonstrated good understanding of exercises and required moderate cueing to maintain proper form.      Yue Is progressing well towards her goals.   Pt prognosis is Good.     Pt will continue to benefit from skilled outpatient physical therapy to address the deficits listed in the problem list box on initial evaluation, provide pt/family education and to maximize pt's level of independence in the home and community environment.     Pt's spiritual, cultural and educational needs considered and pt agreeable to plan of care and goals.     Anticipated barriers to physical therapy: none    Goals:   Short Term Goals: In 4 weeks   1.Pt to be educated on HEP. met  2.Patient to increase GH ROM to 150 degrees flexion or better. met  3.Patient to increase strength to 3+/5 with external rotation or better. met  4.Patient to have pain less than 5/10 or better at all times. met  5.Patient to improve score on the FOTO by 10%. met  6. Pt to be educated on postural and body mechanics awareness. progressing     Long Term Goals: In 10 weeks to 7/5/24  1. Patient to improve score on the FOTO to 66. To be assessed  2. Patient to demo increase in UE  strength to 4/5 with flexion and rotation. met  3. Patient to have decreased pain to 2/10 at all times. progressing  4. Patient to demo increase GH ROM to 65 degrees or better with Internal rotation and reaching behind back to T9 or better. progressing  5. Patient to perform daily activities including dressing without help, lifting up to 5 # overhead and carrying up to 20 # or better in the R UE. progressing        Plan   Plan of care Certification: 3/25/2024 to 7/5/24     Outpatient Physical Therapy 2 times weekly for 5 weeks (effective week of 5/30/24) to include the following interventions: Cervical/Lumbar Traction, Electrical Stimulation IFC, NMES, Manual Therapy, Moist Heat/ Ice, Neuromuscular Re-ed, Patient Education, Self Care, Therapeutic Activities, Therapeutic Exercise, and DN.      Continue Plan of Care (POC) and progress per patient tolerance.    Nithya Hummel, PT, CIDN, SFMA

## 2024-07-12 ENCOUNTER — OFFICE VISIT (OUTPATIENT)
Dept: URGENT CARE | Facility: CLINIC | Age: 54
End: 2024-07-12

## 2024-07-12 VITALS
SYSTOLIC BLOOD PRESSURE: 128 MMHG | TEMPERATURE: 98 F | DIASTOLIC BLOOD PRESSURE: 75 MMHG | OXYGEN SATURATION: 98 % | RESPIRATION RATE: 17 BRPM | HEIGHT: 62 IN | WEIGHT: 165.19 LBS | HEART RATE: 81 BPM | BODY MASS INDEX: 30.4 KG/M2

## 2024-07-12 DIAGNOSIS — M54.50 ACUTE BILATERAL LOW BACK PAIN WITHOUT SCIATICA: Primary | ICD-10-CM

## 2024-07-12 RX ORDER — CYCLOBENZAPRINE HCL 10 MG
10 TABLET ORAL 3 TIMES DAILY PRN
Qty: 15 TABLET | Refills: 0 | Status: SHIPPED | OUTPATIENT
Start: 2024-07-12 | End: 2024-07-17

## 2024-07-12 RX ORDER — GABAPENTIN 100 MG/1
100 CAPSULE ORAL 3 TIMES DAILY
COMMUNITY
Start: 2024-07-03

## 2024-07-12 NOTE — PATIENT INSTRUCTIONS
Back Pain Discharge Instructions:      - Please avoid heavy lifting and strenuous exercise for the next several days.  - Alternate heat and ice for first 48 hours then apply heat.   - You may do gently stretching within your limits of pain.  - Moist warm compresss to area several times daily.    - May use a heating pad on LOW to provide heat over a towel which was dampended with warm water. DO NOT FALL ASLEEP WITH HEATING PAD ON.    - Do not stay in one position to long.  When sleeping on your back place a pillow under knees to reduce tension on back.  If sleeping on your side, place pillow between knees to keep spine in better alinement.    - Wear supportive shoes such as tennis shoes for support of the lower back.    - Take any medication as directed.  If you were not prescribed an anti-inflammatory medication, and if you do not have any history of stomach/intestinal ulcers, or kidney disease, or are not taking a blood thinner such as Coumadin, Plavix, Pradaxa, Eloquis, or Xaralta for example, it is OK to take over the counter Ibuprofen or Advil or Motrin or Aleve as directed WHICH ARE (NSAIDS).  Do not take these medications on an empty stomach.      - You have been prescribed Flexeril for muscle pain.      Flexeril Warning:   The medication you have been prescribed may cause drowsiness and impair your judgement.  Therefore, you should avoid driving, climbing, using machinery, etc., so as not to increase your risk of injury.  Do NOT drink any alcohol while on this medication(s).       Please follow-up with your primary care provider if your symptoms continue.    Go to the emergency department for any new or worsening symptoms including but not limited to: loss of control of your bowel and/or bladder, sensation loss in between your legs by your genitalia and/or rectum.

## 2024-07-12 NOTE — PROGRESS NOTES
"Subjective:      Patient ID: Yue Serrato is a 54 y.o. female.    Vitals:  height is 5' 2" (1.575 m) and weight is 74.9 kg (165 lb 3.2 oz). Her oral temperature is 98.1 °F (36.7 °C). Her blood pressure is 128/75 and her pulse is 81. Her respiration is 17 and oxygen saturation is 98%.     Chief Complaint: Back Pain    53 y/o female presents to clinic with a c/c of bilateral lower back pain that started 2 days ago.  Pt stated she had bent down and felt a shooting pain.  Rates pain at rest as a 3/10 but worsens with movement. Pt has been taking tylenol and alternating with advil back and muscle pain with no relief.  Pain 4/10 at present.  Pain is worse when squatting or bending. Denies known trauma. Denies fever, chills, abdominal pain, flank pain, n/v, urinary symptoms. Denies previous back surgeries. Denies neck pain. Patient denies any radiating leg pain, paresthesias, weakness, saddle anesthesia or B/B dysfunction. Allergic to morphine.       Back Pain  This is a new problem. The current episode started in the past 7 days. The problem occurs intermittently. The problem is unchanged. The pain is present in the lumbar spine. The quality of the pain is described as shooting, cramping and burning. The pain does not radiate. The pain is at a severity of 4/10. The pain is moderate. The pain is The same all the time. The symptoms are aggravated by lying down, bending, sitting and twisting. Stiffness is present All day. Pertinent negatives include no abdominal pain, bladder incontinence, bowel incontinence, chest pain, dysuria, fever, leg pain, numbness, tingling or weakness. She has tried NSAIDs and heat for the symptoms. The treatment provided no relief.       Constitution: Negative for chills and fever.   Cardiovascular:  Negative for chest pain.   Gastrointestinal:  Negative for abdominal pain, nausea, vomiting, constipation, diarrhea and bowel incontinence.   Genitourinary:  Negative for dysuria, frequency, " urgency, flank pain and bladder incontinence.   Musculoskeletal:  Positive for back pain and history of spine disorder (bulging disc).   Neurological:  Negative for numbness.      Objective:     Vitals:    07/12/24 1127   BP: 128/75   Pulse: 81   Resp: 17   Temp: 98.1 °F (36.7 °C)       Physical Exam   Constitutional: She is oriented to person, place, and time. She appears well-developed. She is cooperative.  Non-toxic appearance. She does not appear ill. No distress.   HENT:   Head: Normocephalic and atraumatic.   Nose: Nose normal.   Mouth/Throat: Oropharynx is clear and moist and mucous membranes are normal.   Eyes: Conjunctivae and lids are normal.   Neck: Trachea normal and phonation normal. Neck supple.   Cardiovascular: Normal rate, regular rhythm, normal heart sounds and normal pulses.   Pulmonary/Chest: Effort normal and breath sounds normal. No stridor. No respiratory distress. She has no wheezes. She has no rhonchi. She has no rales.   Abdominal: Normal appearance and bowel sounds are normal. She exhibits no distension and no mass. Soft. There is no abdominal tenderness. There is no rebound and no guarding. No hernia.   Musculoskeletal:         General: No deformity.      Thoracic back: Normal.      Lumbar back: She exhibits tenderness. She exhibits no bony tenderness, no swelling, no deformity and no spasm.      Comments: Back exam:   No open wounds, redness, bruising, swelling, warmth  No bony vertebral ttp of the C/T/L spine. Bilateral lumbar paraspinal ttp.   SLR R/L WNL  ROM intact. Sensation intact. Ambulated without difficulty or assistance.   Neurological: She is alert and oriented to person, place, and time. She has normal strength and normal reflexes. No sensory deficit.   Skin: Skin is warm, dry, intact and not diaphoretic.   Psychiatric: Her speech is normal and behavior is normal. Judgment and thought content normal.   Nursing note and vitals reviewed.      Assessment:     1. Acute bilateral  low back pain without sciatica        Plan:       Acute bilateral low back pain without sciatica  -     cyclobenzaprine (FLEXERIL) 10 MG tablet; Take 1 tablet (10 mg total) by mouth 3 (three) times daily as needed for Muscle spasms.  Dispense: 15 tablet; Refill: 0        Patient Instructions   Back Pain Discharge Instructions:      - Please avoid heavy lifting and strenuous exercise for the next several days.  - Alternate heat and ice for first 48 hours then apply heat.   - You may do gently stretching within your limits of pain.  - Moist warm compresss to area several times daily.    - May use a heating pad on LOW to provide heat over a towel which was dampended with warm water. DO NOT FALL ASLEEP WITH HEATING PAD ON.    - Do not stay in one position to long.  When sleeping on your back place a pillow under knees to reduce tension on back.  If sleeping on your side, place pillow between knees to keep spine in better alinement.    - Wear supportive shoes such as tennis shoes for support of the lower back.    - Take any medication as directed.  If you were not prescribed an anti-inflammatory medication, and if you do not have any history of stomach/intestinal ulcers, or kidney disease, or are not taking a blood thinner such as Coumadin, Plavix, Pradaxa, Eloquis, or Xaralta for example, it is OK to take over the counter Ibuprofen or Advil or Motrin or Aleve as directed WHICH ARE (NSAIDS).  Do not take these medications on an empty stomach.      - You have been prescribed Flexeril for muscle pain.      Flexeril Warning:   The medication you have been prescribed may cause drowsiness and impair your judgement.  Therefore, you should avoid driving, climbing, using machinery, etc., so as not to increase your risk of injury.  Do NOT drink any alcohol while on this medication(s).       Please follow-up with your primary care provider if your symptoms continue.    Go to the emergency department for any new or worsening  symptoms including but not limited to: loss of control of your bowel and/or bladder, sensation loss in between your legs by your genitalia and/or rectum.      Medical Decision Making:   History:   Old Medical Records: I decided to obtain old medical records.  Urgent Care Management:  Patient well appearing, VSS. FROM of back with no midline tenderness.   Imaging not indicated based on physical exam and history.  Discussed muscle strain versus pulled muscle. Advised patient on over the counter measures and home remedies. Also discussed treatment with Flexeril. Return to clinic or follow-up with PCP if symptoms do not resolve.  Strict ER precautions given for back pain including numbness, tingling, saddle anesthesia, bowel or bladder incontinence or worsening back pain that does not resolve with treatment regimen. Patient agreed with treatment plan and has no further questions at this time. Discharge instructions reviewed with patient and patient exits exam room in no acute distress.

## 2024-07-19 DIAGNOSIS — M79.642 BILATERAL HAND PAIN: Primary | ICD-10-CM

## 2024-07-19 DIAGNOSIS — M79.641 BILATERAL HAND PAIN: Primary | ICD-10-CM

## 2024-07-25 ENCOUNTER — HOSPITAL ENCOUNTER (EMERGENCY)
Facility: HOSPITAL | Age: 54
Discharge: HOME | End: 2024-07-25
Attending: EMERGENCY MEDICINE
Payer: MEDICAID

## 2024-07-25 VITALS
WEIGHT: 162.69 LBS | HEART RATE: 72 BPM | RESPIRATION RATE: 18 BRPM | BODY MASS INDEX: 29.76 KG/M2 | SYSTOLIC BLOOD PRESSURE: 152 MMHG | OXYGEN SATURATION: 99 % | DIASTOLIC BLOOD PRESSURE: 73 MMHG | TEMPERATURE: 98 F

## 2024-07-25 DIAGNOSIS — G56.03 BILATERAL CARPAL TUNNEL SYNDROME: Primary | ICD-10-CM

## 2024-07-25 DIAGNOSIS — R20.2 PARESTHESIA OF BOTH HANDS: ICD-10-CM

## 2024-07-25 LAB
ALBUMIN SERPL BCP-MCNC: 4 G/DL (ref 3.5–5.2)
ALP SERPL-CCNC: 90 U/L (ref 55–135)
ALT SERPL W/O P-5'-P-CCNC: 24 U/L (ref 10–44)
ANION GAP SERPL CALC-SCNC: 11 MMOL/L (ref 8–16)
AST SERPL-CCNC: 23 U/L (ref 10–40)
BASOPHILS # BLD AUTO: 0.04 K/UL (ref 0–0.2)
BASOPHILS NFR BLD: 0.6 % (ref 0–1.9)
BILIRUB SERPL-MCNC: 1.2 MG/DL (ref 0.1–1)
BUN SERPL-MCNC: 14 MG/DL (ref 6–20)
CALCIUM SERPL-MCNC: 10 MG/DL (ref 8.7–10.5)
CHLORIDE SERPL-SCNC: 105 MMOL/L (ref 95–110)
CO2 SERPL-SCNC: 22 MMOL/L (ref 23–29)
CREAT SERPL-MCNC: 0.8 MG/DL (ref 0.5–1.4)
DIFFERENTIAL METHOD BLD: ABNORMAL
EOSINOPHIL # BLD AUTO: 0.1 K/UL (ref 0–0.5)
EOSINOPHIL NFR BLD: 1.2 % (ref 0–8)
ERYTHROCYTE [DISTWIDTH] IN BLOOD BY AUTOMATED COUNT: 12.5 % (ref 11.5–14.5)
EST. GFR  (NO RACE VARIABLE): >60 ML/MIN/1.73 M^2
GLUCOSE SERPL-MCNC: 84 MG/DL (ref 70–110)
HCT VFR BLD AUTO: 40.3 % (ref 37–48.5)
HGB BLD-MCNC: 13.7 G/DL (ref 12–16)
IMM GRANULOCYTES # BLD AUTO: 0.04 K/UL (ref 0–0.04)
IMM GRANULOCYTES NFR BLD AUTO: 0.6 % (ref 0–0.5)
LYMPHOCYTES # BLD AUTO: 2.2 K/UL (ref 1–4.8)
LYMPHOCYTES NFR BLD: 33.4 % (ref 18–48)
MCH RBC QN AUTO: 28.9 PG (ref 27–31)
MCHC RBC AUTO-ENTMCNC: 34 G/DL (ref 32–36)
MCV RBC AUTO: 85 FL (ref 82–98)
MONOCYTES # BLD AUTO: 0.5 K/UL (ref 0.3–1)
MONOCYTES NFR BLD: 6.7 % (ref 4–15)
NEUTROPHILS # BLD AUTO: 3.9 K/UL (ref 1.8–7.7)
NEUTROPHILS NFR BLD: 57.5 % (ref 38–73)
NRBC BLD-RTO: 0 /100 WBC
PLATELET # BLD AUTO: 254 K/UL (ref 150–450)
PMV BLD AUTO: 9.3 FL (ref 9.2–12.9)
POTASSIUM SERPL-SCNC: 3.9 MMOL/L (ref 3.5–5.1)
PROT SERPL-MCNC: 7.9 G/DL (ref 6–8.4)
RBC # BLD AUTO: 4.74 M/UL (ref 4–5.4)
SODIUM SERPL-SCNC: 138 MMOL/L (ref 136–145)
WBC # BLD AUTO: 6.7 K/UL (ref 3.9–12.7)

## 2024-07-25 PROCEDURE — 99284 EMERGENCY DEPT VISIT MOD MDM: CPT | Mod: 25

## 2024-07-25 PROCEDURE — 80053 COMPREHEN METABOLIC PANEL: CPT | Performed by: PHYSICIAN ASSISTANT

## 2024-07-25 PROCEDURE — 85025 COMPLETE CBC W/AUTO DIFF WBC: CPT | Performed by: PHYSICIAN ASSISTANT

## 2024-07-25 RX ORDER — TRAMADOL HYDROCHLORIDE 50 MG/1
50 TABLET ORAL EVERY 6 HOURS PRN
Qty: 20 TABLET | Refills: 0 | Status: SHIPPED | OUTPATIENT
Start: 2024-07-25

## 2024-07-25 RX ORDER — PREDNISONE 20 MG/1
40 TABLET ORAL DAILY
Qty: 10 TABLET | Refills: 0 | Status: SHIPPED | OUTPATIENT
Start: 2024-07-25 | End: 2024-07-30

## 2024-07-25 NOTE — FIRST PROVIDER EVALUATION
Medical screening examination initiated.  I have conducted a focused provider triage encounter, findings are as follows:    Brief history of present illness:  Bilateral hand tingling for years. Worse over last month    Vitals:    07/25/24 1204   BP: (!) 141/68   Pulse: 83   Resp: 18   Temp: 98 °F (36.7 °C)   TempSrc: Oral   SpO2: 99%   Weight: 73.8 kg (162 lb 11.2 oz)       Pertinent physical exam:  Strong BUE's    Brief workup plan:  labs, c-spine    Preliminary workup initiated; this workup will be continued and followed by the physician or advanced practice provider that is assigned to the patient when roomed.

## 2024-07-25 NOTE — ED PROVIDER NOTES
"Encounter Date: 2024       History     Chief Complaint   Patient presents with    Hand Pain     Pain, numbness and tingling to bilateral hand for years, worse x one month. Patient with hx. of herniated disc in neck     54-year-old female presents emergency department complaints of pain, numbness and tingling of bilateral hands.  Patient reports symptoms have waxed and waned over the last several years.  Patient states symptoms worsened over the last month.  Patient denies any chest pain, shortness of breath, nausea/vomiting, abdominal pain or any other symptoms.    The history is provided by the patient.     Review of patient's allergies indicates:   Allergen Reactions    Morphine Other (See Comments)     Reports "headache"  Other reaction(s): HEADACHE     Past Medical History:   Diagnosis Date    Anxiety     Depression     Diabetes mellitus type II     Diverticulitis 2012    History of abnormal cervical Pap smear     HSV infection     Hyperlipidemia     Hypertension     Liver disease     fatty liver     Past Surgical History:   Procedure Laterality Date     SECTION      x3    COLONOSCOPY  2019    normal    CYSTOSCOPY WITH BIOPSY OF BLADDER N/A 2024    Procedure: CYSTOSCOPY, WITH BLADDER BIOPSY, WITH FULGURATION IF INDICATED;  Surgeon: Jovi Jackson MD;  Location: [facility];  Service: Urology;  Laterality: N/A;    ESOPHAGOGASTRODUODENOSCOPY N/A 2021    Procedure: EGD (ESOPHAGOGASTRODUODENOSCOPY);  Surgeon: Prerna Poole MD;  Location: [facility];  Service: Endoscopy;  Laterality: N/A;    ESOPHAGOGASTRODUODENOSCOPY N/A 2023    Procedure: EGD (ESOPHAGOGASTRODUODENOSCOPY);  Surgeon: Hector Reyes MD;  Location: [facility];  Service: Endoscopy;  Laterality: N/A;    LIPOMA RESECTION      upper back    TOTAL ABDOMINAL HYSTERECTOMY W/ BILATERAL SALPINGOOPHORECTOMY Bilateral 2005    dyplasia     Family History   Problem Relation Name Age of Onset    Heart disease Mother   "        CAD    Diabetes Mother      Hypertension Mother      Hyperlipidemia Mother      COPD Mother      COPD Father      Diabetes Father      Hyperlipidemia Father      Hypertension Father      Heart disease Father          MI    Colon cancer Neg Hx       Social History     Tobacco Use    Smoking status: Never     Passive exposure: Never    Smokeless tobacco: Never   Substance Use Topics    Alcohol use: Yes     Comment: occasionally    Drug use: No     Review of Systems   Constitutional:  Negative for fever.   HENT:  Negative for sore throat.    Respiratory:  Negative for shortness of breath.    Cardiovascular:  Negative for chest pain.   Gastrointestinal:  Negative for nausea.   Genitourinary:  Negative for dysuria.   Musculoskeletal:  Negative for back pain.        +bilateral hand pain/numbness   Skin:  Negative for rash.   Neurological:  Negative for weakness.   Hematological:  Does not bruise/bleed easily.   All other systems reviewed and are negative.      Physical Exam     Initial Vitals [07/25/24 1204]   BP Pulse Resp Temp SpO2   (!) 141/68 83 18 98 °F (36.7 °C) 99 %      MAP       --         Physical Exam    Constitutional: She appears well-developed and well-nourished. She is not diaphoretic. No distress.   HENT:   Head: Normocephalic and atraumatic.   Eyes: Conjunctivae and EOM are normal. Pupils are equal, round, and reactive to light.   Neck: Neck supple.   Normal range of motion.  Cardiovascular:  Normal rate, regular rhythm and normal heart sounds.           No murmur heard.  Pulmonary/Chest: Breath sounds normal. No respiratory distress. She has no wheezes. She has no rales.   Abdominal: Abdomen is soft. Bowel sounds are normal. There is no abdominal tenderness. There is no rebound and no guarding.   Musculoskeletal:         General: No edema. Normal range of motion.      Right hand: Tenderness present. No swelling. Normal range of motion. There is no disruption of two-point discrimination. Normal  capillary refill. Normal pulse.      Left hand: Tenderness present. No swelling. Normal range of motion. There is no disruption of two-point discrimination. Normal capillary refill. Normal pulse.      Cervical back: Normal range of motion and neck supple.     Neurological: She is alert and oriented to person, place, and time. No cranial nerve deficit. GCS score is 15. GCS eye subscore is 4. GCS verbal subscore is 5. GCS motor subscore is 6.   Skin: Skin is warm and dry. Capillary refill takes less than 2 seconds.   Psychiatric: She has a normal mood and affect. Thought content normal.         ED Course   Procedures  Labs Reviewed   CBC W/ AUTO DIFFERENTIAL - Abnormal       Result Value    WBC 6.70      RBC 4.74      Hemoglobin 13.7      Hematocrit 40.3      MCV 85      MCH 28.9      MCHC 34.0      RDW 12.5      Platelets 254      MPV 9.3      Immature Granulocytes 0.6 (*)     Gran # (ANC) 3.9      Immature Grans (Abs) 0.04      Lymph # 2.2      Mono # 0.5      Eos # 0.1      Baso # 0.04      nRBC 0      Gran % 57.5      Lymph % 33.4      Mono % 6.7      Eosinophil % 1.2      Basophil % 0.6      Differential Method Automated     COMPREHENSIVE METABOLIC PANEL - Abnormal    Sodium 138      Potassium 3.9      Chloride 105      CO2 22 (*)     Glucose 84      BUN 14      Creatinine 0.8      Calcium 10.0      Total Protein 7.9      Albumin 4.0      Total Bilirubin 1.2 (*)     Alkaline Phosphatase 90      AST 23      ALT 24      eGFR >60      Anion Gap 11            Imaging Results              X-Ray Cervical Spine AP And Lateral (Final result)  Result time 07/25/24 12:43:28      Final result by Esvin Perrin MD (07/25/24 12:43:28)                   Impression:      Normal cervical spine radiographs.      Electronically signed by: Esvin Perrin MD  Date:    07/25/2024  Time:    12:43               Narrative:    EXAMINATION:  XR CERVICAL SPINE AP LATERAL    CLINICAL HISTORY:  . Paresthesia of skin    TECHNIQUE:  AP,  Lateral,  and  open mouth views of the cervical spine were performed.    COMPARISON:  None    FINDINGS:  There is normal alignment to the cervical spine.  No fracture or dislocation is seen.  Mild disc space narrowing and spondylosis at C6/7                                       Medications - No data to display  Medical Decision Making  Amount and/or Complexity of Data Reviewed  Discussion of management or test interpretation with external provider(s): Symptoms likely related to carpal tunnel syndrome.  Will start on brief course of steroids and tramadol as needed for pain.  Patient should follow up with the primary care physician.  If symptoms worsen patient should return the emergency department.                                      Clinical Impression:  Final diagnoses:  [R20.2] Paresthesia of both hands  [G56.03] Bilateral carpal tunnel syndrome (Primary)          ED Disposition Condition    Discharge Stable          ED Prescriptions       Medication Sig Dispense Start Date End Date Auth. Provider    predniSONE (DELTASONE) 20 MG tablet Take 2 tablets (40 mg total) by mouth once daily. for 5 days 10 tablet 7/25/2024 7/30/2024 Josesito Ledezma Jr., FNP    traMADoL (ULTRAM) 50 mg tablet Take 1 tablet (50 mg total) by mouth every 6 (six) hours as needed for Pain. 20 tablet 7/25/2024 -- Josesito Ledezma Jr., FNP          Follow-up Information       Follow up With Specialties Details Why Contact Info    Melanie Vigil FNP Family Medicine In 1 week  [address] [facility] [address]  [address] [place] [address] [id number] [phone]               Josesito Ledezma Jr., FNP  07/25/24 0184

## 2024-08-11 ENCOUNTER — HOSPITAL ENCOUNTER (EMERGENCY)
Facility: HOSPITAL | Age: 54
Discharge: HOME | End: 2024-08-11
Attending: STUDENT IN AN ORGANIZED HEALTH CARE EDUCATION/TRAINING PROGRAM
Payer: MEDICAID

## 2024-08-11 VITALS
SYSTOLIC BLOOD PRESSURE: 165 MMHG | BODY MASS INDEX: 30 KG/M2 | WEIGHT: 164 LBS | RESPIRATION RATE: 16 BRPM | DIASTOLIC BLOOD PRESSURE: 72 MMHG | OXYGEN SATURATION: 97 % | HEART RATE: 95 BPM | TEMPERATURE: 99 F

## 2024-08-11 DIAGNOSIS — L08.9 PUSTULES DETERMINED BY EXAMINATION: Primary | ICD-10-CM

## 2024-08-11 PROCEDURE — 99284 EMERGENCY DEPT VISIT MOD MDM: CPT | Mod: 25

## 2024-08-11 PROCEDURE — 63600175 PHARM REV CODE 636 W HCPCS: Mod: JZ,JG | Performed by: NURSE PRACTITIONER

## 2024-08-11 PROCEDURE — 96372 THER/PROPH/DIAG INJ SC/IM: CPT | Performed by: NURSE PRACTITIONER

## 2024-08-11 RX ORDER — CLINDAMYCIN HYDROCHLORIDE 150 MG/1
450 CAPSULE ORAL 3 TIMES DAILY
Qty: 63 CAPSULE | Refills: 0 | Status: SHIPPED | OUTPATIENT
Start: 2024-08-11 | End: 2024-08-18

## 2024-08-11 RX ORDER — HYDROCODONE BITARTRATE AND ACETAMINOPHEN 5; 325 MG/1; MG/1
1 TABLET ORAL EVERY 6 HOURS PRN
Qty: 12 TABLET | Refills: 0 | Status: SHIPPED | OUTPATIENT
Start: 2024-08-11

## 2024-08-11 RX ORDER — CLINDAMYCIN PHOSPHATE 150 MG/ML
600 INJECTION, SOLUTION INTRAVENOUS
Status: COMPLETED | OUTPATIENT
Start: 2024-08-11 | End: 2024-08-11

## 2024-08-11 RX ADMIN — CLINDAMYCIN PHOSPHATE 600 MG: 150 INJECTION, SOLUTION INTRAMUSCULAR; INTRAVENOUS at 09:08

## 2024-08-12 NOTE — ED PROVIDER NOTES
"Encounter Date: 2024       History     Chief Complaint   Patient presents with    Rash     Rash under right breast 4 days. Noticed similar puss filled bumps upper right leg earlier today.      54-year-old female presents the emergency department for a rash noted to the right abdominal region and right upper leg.  Reports onset several days ago.  Patient any fever, chills, chest pain, shortness of breath, back pain, abdominal pain, nausea, vomiting, and all other concerns at this time    The history is provided by the patient. No  was used.     Review of patient's allergies indicates:   Allergen Reactions    Morphine Other (See Comments)     Reports "headache"  Other reaction(s): HEADACHE     Past Medical History:   Diagnosis Date    Anxiety     Depression     Diabetes mellitus type II     Diverticulitis 2012    History of abnormal cervical Pap smear     HSV infection     Hyperlipidemia     Hypertension     Liver disease     fatty liver     Past Surgical History:   Procedure Laterality Date     SECTION      x3    COLONOSCOPY  2019    normal    CYSTOSCOPY WITH BIOPSY OF BLADDER N/A 2024    Procedure: CYSTOSCOPY, WITH BLADDER BIOPSY, WITH FULGURATION IF INDICATED;  Surgeon: Jovi Jackson MD;  Location: [facility];  Service: Urology;  Laterality: N/A;    ESOPHAGOGASTRODUODENOSCOPY N/A 2021    Procedure: EGD (ESOPHAGOGASTRODUODENOSCOPY);  Surgeon: Prerna Poole MD;  Location: [facility];  Service: Endoscopy;  Laterality: N/A;    ESOPHAGOGASTRODUODENOSCOPY N/A 2023    Procedure: EGD (ESOPHAGOGASTRODUODENOSCOPY);  Surgeon: Hector Reyes MD;  Location: [facility];  Service: Endoscopy;  Laterality: N/A;    LIPOMA RESECTION      upper back    TOTAL ABDOMINAL HYSTERECTOMY W/ BILATERAL SALPINGOOPHORECTOMY Bilateral 2005    dyplasia     Family History   Problem Relation Name Age of Onset    Heart disease Mother          CAD    Diabetes Mother      " Hypertension Mother      Hyperlipidemia Mother      COPD Mother      COPD Father      Diabetes Father      Hyperlipidemia Father      Hypertension Father      Heart disease Father          MI    Colon cancer Neg Hx       Social History     Tobacco Use    Smoking status: Never     Passive exposure: Never    Smokeless tobacco: Never   Substance Use Topics    Alcohol use: Yes     Comment: occasionally    Drug use: No     Review of Systems   Constitutional:  Negative for fever.   HENT:  Negative for sore throat.    Respiratory:  Negative for shortness of breath.    Cardiovascular:  Negative for chest pain.   Gastrointestinal:  Negative for abdominal pain, nausea and vomiting.   Genitourinary:  Negative for dysuria.   Musculoskeletal:  Negative for back pain.   Skin:  Positive for rash.   Neurological:  Negative for weakness.   Hematological:  Does not bruise/bleed easily.       Physical Exam     Initial Vitals [08/11/24 2131]   BP Pulse Resp Temp SpO2   (!) 190/87 94 18 98.5 °F (36.9 °C) 99 %      MAP       --         Physical Exam    Nursing note and vitals reviewed.  Constitutional: She appears well-developed and well-nourished. She is not diaphoretic. No distress.   HENT:   Head: Normocephalic and atraumatic.   Eyes: Right eye exhibits no discharge. Left eye exhibits no discharge.   Neck: Neck supple.   Normal range of motion.  Cardiovascular:  Normal rate.           Pulmonary/Chest: No respiratory distress.   Abdominal: She exhibits no distension.   Musculoskeletal:         General: Normal range of motion.      Cervical back: Normal range of motion and neck supple.     Neurological: She is alert and oriented to person, place, and time. She has normal strength.   Skin: Skin is warm and dry.   Pustules noted to the right abdominal wall just below the breast.  There are also pustules noted to the anterior right upper leg.  Female chaperone present for exam.   Psychiatric: She has a normal mood and affect. Her behavior  is normal. Thought content normal.         ED Course   Procedures  Labs Reviewed - No data to display       Imaging Results    None          Medications   clindamycin injection 600 mg (600 mg Intramuscular Given 8/11/24 215)     Medical Decision Making  Risk  Prescription drug management.                                      Clinical Impression:  Final diagnoses:  [L08.9] Pustules determined by examination (Primary)          ED Disposition Condition    Discharge Stable          ED Prescriptions       Medication Sig Dispense Start Date End Date Auth. Provider    clindamycin (CLEOCIN) 150 MG capsule Take 3 capsules (450 mg total) by mouth 3 (three) times daily. for 7 days 63 capsule 8/11/2024 8/18/2024 Mick Buitrago, NP    HYDROcodone-acetaminophen (NORCO) 5-325 mg per tablet Take 1 tablet by mouth every 6 (six) hours as needed for Pain. 12 tablet 8/11/2024 -- Mick Buitrago NP          Follow-up Information       Follow up With Specialties Details Why Contact Info    pcp of choice   As needed     [facility] - Emergency Dept. Emergency Medicine  If symptoms worsen [id number] [facility] [id number] [phone]             Mick Buitrago NP  08/11/24 2844

## 2024-09-05 ENCOUNTER — TELEPHONE (OUTPATIENT)
Dept: UROLOGY | Facility: CLINIC | Age: 54
End: 2024-09-05
Payer: MEDICAID

## 2024-09-05 RX ORDER — PHENAZOPYRIDINE HYDROCHLORIDE 100 MG/1
100 TABLET, FILM COATED ORAL 3 TIMES DAILY PRN
Qty: 30 TABLET | Refills: 2 | Status: SHIPPED | OUTPATIENT
Start: 2024-09-05 | End: 2024-10-05

## 2024-09-05 NOTE — TELEPHONE ENCOUNTER
Spoke with patient, she wants Dr Jackson call in pyridium to her pharmacy    ----- Message from Papa Baker sent at 9/5/2024 11:48 AM CDT -----  Contact: self   .Type: Patient Call Back        Who called:Patient         What is the request in detail:    Called in concerning being seen by the provider . Patient states the medication she was prescribed isnt working . Please call back   Can the clinic reply by [facility]?           Would the patient rather a call back or a response via [facility]?      call   Best call back number:  [phone]

## 2024-09-06 ENCOUNTER — TELEPHONE (OUTPATIENT)
Dept: UROLOGY | Facility: CLINIC | Age: 54
End: 2024-09-06
Payer: MEDICAID

## 2024-09-06 NOTE — TELEPHONE ENCOUNTER
I contacted [facility] to obtain patient most recent urine culture results. I gave the nurse the fax number, awaiting results.

## 2024-09-25 ENCOUNTER — OFFICE VISIT (OUTPATIENT)
Dept: UROLOGY | Facility: CLINIC | Age: 54
End: 2024-09-25
Payer: MEDICAID

## 2024-09-25 VITALS
WEIGHT: 156.94 LBS | SYSTOLIC BLOOD PRESSURE: 131 MMHG | BODY MASS INDEX: 27.81 KG/M2 | HEART RATE: 81 BPM | DIASTOLIC BLOOD PRESSURE: 76 MMHG

## 2024-09-25 DIAGNOSIS — R31.0 GROSS HEMATURIA: Primary | ICD-10-CM

## 2024-09-25 PROCEDURE — 99214 OFFICE O/P EST MOD 30 MIN: CPT | Mod: PBBFAC | Performed by: UROLOGY

## 2024-09-25 PROCEDURE — 1160F RVW MEDS BY RX/DR IN RCRD: CPT | Mod: CPTII,,, | Performed by: UROLOGY

## 2024-09-25 PROCEDURE — 99999 PR PBB SHADOW E&M-EST. PATIENT-LVL IV: CPT | Mod: PBBFAC,,, | Performed by: UROLOGY

## 2024-09-25 PROCEDURE — 1159F MED LIST DOCD IN RCRD: CPT | Mod: CPTII,,, | Performed by: UROLOGY

## 2024-09-25 PROCEDURE — 3008F BODY MASS INDEX DOCD: CPT | Mod: CPTII,,, | Performed by: UROLOGY

## 2024-09-25 PROCEDURE — 3075F SYST BP GE 130 - 139MM HG: CPT | Mod: CPTII,,, | Performed by: UROLOGY

## 2024-09-25 PROCEDURE — 3078F DIAST BP <80 MM HG: CPT | Mod: CPTII,,, | Performed by: UROLOGY

## 2024-09-25 PROCEDURE — 99214 OFFICE O/P EST MOD 30 MIN: CPT | Mod: S$PBB,,, | Performed by: UROLOGY

## 2024-09-25 NOTE — PROGRESS NOTES
Chief Complaint:  Recurrent UTIs, microscopic hematuria    HPI:   09/25/2024 - returns today for follow-up, an instance of gross hematuria in May and continues to have UTIs, had a UTI early this month, went to urgent care and urine culture grew out Klebsiella resistant ampicillin and intermediate to Macrobid, took a day and a half of Bactrim but had a lot of nausea with it, usually tolerates that well, urine today completely clear, side gynecology late last month and they put her back on Vagifem but she has not restarted this, took the Hiprex but made her feel awful so she stopped this    04/30/2024 - returns today for follow-up and review of her pathology, this shows no cancer from her biopsy, patient notes worsening issues with dysuria and frequency, urine shows blood leukocytes and +glucose    04/02/2024 - patient returns today for cystoscopy, no recent UTIs    02/23/2024 - 52 yo female that presents for evaluation of recurrent UTIs and microscopic hematuria.  Patient notes that she has had UTIs all my life but more recently over the last 2-3 months she has had issues with recurrent infections.  Notes that it takes towards the end of the antibiotics cycle for her symptoms to improve, and then her symptoms will improve for 5-7 days and then will return.  Patient notes dysuria, urgency, and suprapubic discomfort.  Patient notes history of poorly-controlled diabetes, A1c was 12 and then improved to 8.5.  She has not currently sexually active, notes that the last time she had intercourse, she had discomfort and bleeding.  She denies any gross hematuria.  Denies family history of  cancers.      PMH:  Past Medical History:   Diagnosis Date    Anxiety     Depression     Diabetes mellitus type II     Diverticulitis 2012    History of abnormal cervical Pap smear     HSV infection     Hyperlipidemia     Hypertension     Liver disease     fatty liver       PSH:  Past Surgical History:   Procedure Laterality Date      SECTION      x3    COLONOSCOPY  2019    normal    CYSTOSCOPY WITH BIOPSY OF BLADDER N/A 2024    Procedure: CYSTOSCOPY, WITH BLADDER BIOPSY, WITH FULGURATION IF INDICATED;  Surgeon: Jovi Jackson MD;  Location: [facility] OR;  Service: Urology;  Laterality: N/A;    ESOPHAGOGASTRODUODENOSCOPY N/A 2021    Procedure: EGD (ESOPHAGOGASTRODUODENOSCOPY);  Surgeon: Prerna Poole MD;  Location: [facility] ENDO;  Service: Endoscopy;  Laterality: N/A;    ESOPHAGOGASTRODUODENOSCOPY N/A 2023    Procedure: EGD (ESOPHAGOGASTRODUODENOSCOPY);  Surgeon: Hector Reyes MD;  Location: [facility] ENDO;  Service: Endoscopy;  Laterality: N/A;    LIPOMA RESECTION      upper back    TOTAL ABDOMINAL HYSTERECTOMY W/ BILATERAL SALPINGOOPHORECTOMY Bilateral 2005    dyplasia       Family History:  Family History   Problem Relation Name Age of Onset    Heart disease Mother          CAD    Diabetes Mother      Hypertension Mother      Hyperlipidemia Mother      COPD Mother      COPD Father      Diabetes Father      Hyperlipidemia Father      Hypertension Father      Heart disease Father          MI    Colon cancer Neg Hx         Social History:  Social History     Tobacco Use    Smoking status: Never     Passive exposure: Never    Smokeless tobacco: Never   Substance Use Topics    Alcohol use: Yes     Comment: occasionally    Drug use: No        Review of Systems:  General: No fever, chills  Skin: No rashes  Chest:  Denies cough and sputum production  Heart: Denies chest pain  Resp: Denies dyspnea  Abdomen: Denies diarrhea, abdominal pain, hematemesis, or blood in stool.  Musculoskeletal: No joint stiffness or swelling. Denies back pain.  : see HPI  Neuro: no dizziness or weakness    Allergies:  Morphine    Medications:    Current Outpatient Medications:     acetaminophen (TYLENOL) 500 MG tablet, Tylenol Take @0700 No date recorded No form recorded No frequency recorded No route recorded No set duration recorded  No set duration amount recorded suspended No dosage strength recorded No dosage strength units of measure recorded, Disp: , Rfl:     atorvastatin (LIPITOR) 10 MG tablet, Take 10 mg by mouth once daily., Disp: , Rfl:     atorvastatin (LIPITOR) 40 MG tablet, Take 40 mg by mouth., Disp: , Rfl:     blood sugar diagnostic (ACCU-CHEK SMARTVIEW TEST STRIP) Strp, Check BS fastin and 2 hrs after biggest meal, Disp: 60 strip, Rfl: 0    clonazePAM (KLONOPIN) 0.5 MG tablet, Take 0.5 mg by mouth 2 (two) times daily as needed for Anxiety., Disp: , Rfl:     estradioL (VAGIFEM) 10 mcg Tab, Place 1 tablet (10 mcg total) vaginally twice a week., Disp: 8 tablet, Rfl: 11    fenofibrate (TRICOR) 48 MG tablet, 1 tablet Orally Once a day for 30 day(s), Disp: , Rfl:     fenofibrate 160 MG Tab, Take 160 mg by mouth once daily., Disp: , Rfl:     fluticasone propionate (FLONASE) 50 mcg/actuation nasal spray, by Each Nostril route., Disp: , Rfl:     gabapentin (NEURONTIN) 100 MG capsule, Take 100 mg by mouth 3 (three) times daily., Disp: , Rfl:     HYDROcodone-acetaminophen (NORCO)  mg per tablet, Take by mouth., Disp: , Rfl:     HYDROcodone-acetaminophen (NORCO) 5-325 mg per tablet, Take 1 tablet by mouth every 6 to 8 hours as needed for Pain., Disp: 28 tablet, Rfl: 0    HYDROcodone-acetaminophen (NORCO) 5-325 mg per tablet, Take 1 tablet by mouth every 6 (six) hours as needed for Pain., Disp: 12 tablet, Rfl: 0    lancets (ACCU-CHEK SOFTCLIX LANCETS) Misc, Check BS fastin and 2 hrs after biggest meal, Disp: 60 each, Rfl: 0    methenamine (HIPREX) 1 gram Tab, Take 1 tablet (1 g total) by mouth 2 (two) times daily., Disp: 60 tablet, Rfl: 11    nabumetone (RELAFEN) 750 MG tablet, Take 1 tablet (750 mg total) by mouth 2 (two) times daily., Disp: 60 tablet, Rfl: 1    ONETOUCH DELICA PLUS LANCET 33 gauge Misc, Apply topically., Disp: , Rfl:     ONETOUCH VERIO FLEX METER Misc, use as directed, Disp: , Rfl:     OZEMPIC 2 mg/dose (8 mg/3 mL)  PnIj, , Disp: , Rfl:     pantoprazole (PROTONIX) 40 MG tablet, Take 1 tablet (40 mg total) by mouth once daily., Disp: 30 tablet, Rfl: 3    phenazopyridine (PYRIDIUM) 100 MG tablet, Take 1 tablet (100 mg total) by mouth 3 (three) times daily as needed for Pain., Disp: 30 tablet, Rfl: 2    traMADoL (ULTRAM) 50 mg tablet, Take 1 tablet (50 mg total) by mouth every 6 (six) hours as needed for Pain., Disp: 20 tablet, Rfl: 0    TRESIBA FLEXTOUCH U-200 200 unit/mL (3 mL) InPn, INJECT 100 UNITS SUBCUTANEOUSLY ONCE DAILY, Disp: , Rfl: 5    TRINTELLIX 10 mg Tab, Take 1 tablet by mouth., Disp: , Rfl:     vortioxetine (TRINTELLIX) 10 mg Tab, Take 1 tablet by mouth every day, Disp: 30 tablet, Rfl: 1    ZORYVE 0.3 % Crea, Apply topically as needed., Disp: , Rfl:     nystatin (MYCOSTATIN) cream, Apply topically 2 (two) times daily. for 7 days, Disp: 30 g, Rfl: 0    Physical Exam:  Vitals:    09/25/24 0856   BP: 131/76   Pulse: 81     Body mass index is 27.81 kg/m².  General: awake, alert, cooperative  Head: NC/AT  Ears: external ears normal  Eyes: sclera normal  Lungs: normal inspiration, NAD  Heart: well-perfused  Skin: The skin is warm and dry  Ext: No c/c/e.  Neuro: grossly intact, AOx3    RADIOLOGY:  US RETROPERITONEAL KIDNEY AND BLADDER     Indication: [facility] Pacs merged reason for exam: R30.0:Dysuria R31.9:Hematuria, unspecified     Additional information:     Comparison: September 27, 2022 ultrasound abdomen complete     RENAL LENGTH   11.3 x 4.9 x 4.3 cm in length on the right   11.4 x 5.1 x 4.9 cm in length on the left.     RENAL VOLUME   Right: 1/25/2021 ml   Left:   150.1 ml     Cortical echotexture is normal.   No solid mass, shadowing calculus, or hydronephrosis is seen.     Note:     In adults, the normal kidney is 10-14 cm long in males and 9-13 cm long in females, 3-5 cm wide.     Normal volume 110 to 190 ml in men and 90 to 150 ml in women. Renal volume can be estimated by  multiply length times width times height divided by 2.     Imaging performed of the bladder. The bladder is incompletely distended at time of imaging with a prevoiding volume of 98.2 cc. No suspicious bladder mass identified on submitted images.     Note made of echogenic appearance of the liver suggesting hepatic steatosis.     IMPRESSION:     No acute or focal suspicious abnormality of the kidneys.   Note made of echogenic appearance of the liver compatible hepatic steatosis   The bladder is partially distended at the time of imaging with no obvious focal abnormality identified.     LABS:  I personally reviewed the following lab values:  Lab Results   Component Value Date    WBC 6.70 07/25/2024    HGB 13.7 07/25/2024    HCT 40.3 07/25/2024     07/25/2024     07/25/2024    K 3.9 07/25/2024     07/25/2024    CREATININE 0.8 07/25/2024    BUN 14 07/25/2024    CO2 22 (L) 07/25/2024    TSH 1.758 09/10/2013    INR 0.9 05/07/2024    HGBA1C 9.9 (H) 12/12/2013    CHOL 223 (H) 12/12/2013    TRIG 232 (H) 12/12/2013    HDL 48 12/12/2013    ALT 24 07/25/2024    AST 23 07/25/2024     URINALYSIS:  Urinalysis obtained in clinic today specific gravity < 1.005, pH 5.5, negative all    Procedure:  Diagnostic Cystoscopy    Indications:  Hematuria    UA: normal, see lab results for values    Procedure in Detail: After proper consents were obtained, the patient was prepped and draped in normal sterile fashion for diagnostic cystoscopy. 5 ml of lidocaine jelly was instilled in the urethra. The flexible cystoscope was then introduced into the urethra, and advanced into the bladder under direct vision. The urethral mucosa appeared normal, and no strictures were noted. The sphincter was normal. The bladder neck was normal. Inspection of the interior of the bladder was then carried out. The trigone was unremarkable, with no mucosal lesions. The ureteral orifices were normal in position and configuration. Systematic  inspection of the mucosa of the bladder it was then carried out, rotating the cystoscope so that all areas of the left and right lateral walls, the dome of the bladder, and the posterior wall were all visualized.  Along the left posterior aspect of the bladder, erythematous tissue was noted.  The cystoscope was then advanced further into the bladder, and maximum deflection of the scope was performed so that the bladder neck could be inspected. No mucosal lesions were noted there. The cystoscope was then removed, and the procedure terminated. Patient tolerated the procedure well. No complications.     Findings:  Erythematous tissue noted along the left posterior aspect of the bladder      Assessment/Plan:   Yue Serrato is a 54 y.o. female with:    Gross hematuria - obtain CT urogram, follow-up after for virtual visit to review    Bladder lesion - negative biopsy    Recurrent UTIs - restart Vagifem    Jovi Jackson MD  Urology

## 2024-10-10 ENCOUNTER — HOSPITAL ENCOUNTER (OUTPATIENT)
Dept: RADIOLOGY | Facility: HOSPITAL | Age: 54
Discharge: HOME | End: 2024-10-10
Attending: UROLOGY
Payer: MEDICAID

## 2024-10-10 DIAGNOSIS — R31.0 GROSS HEMATURIA: ICD-10-CM

## 2024-10-10 PROCEDURE — 74178 CT ABD&PLV WO CNTR FLWD CNTR: CPT | Mod: TC

## 2024-10-10 PROCEDURE — 25500020 PHARM REV CODE 255: Performed by: UROLOGY

## 2024-10-10 PROCEDURE — 74178 CT ABD&PLV WO CNTR FLWD CNTR: CPT | Mod: 26,,, | Performed by: STUDENT IN AN ORGANIZED HEALTH CARE EDUCATION/TRAINING PROGRAM

## 2024-10-10 RX ADMIN — IOHEXOL 100 ML: 350 INJECTION, SOLUTION INTRAVENOUS at 02:10

## 2024-10-18 ENCOUNTER — TELEPHONE (OUTPATIENT)
Dept: ORTHOPEDICS | Facility: CLINIC | Age: 54
End: 2024-10-18
Payer: MEDICAID

## 2024-10-18 ENCOUNTER — HOSPITAL ENCOUNTER (EMERGENCY)
Facility: HOSPITAL | Age: 54
Discharge: HOME | End: 2024-10-18
Attending: EMERGENCY MEDICINE
Payer: MEDICAID

## 2024-10-18 VITALS
RESPIRATION RATE: 16 BRPM | TEMPERATURE: 100 F | SYSTOLIC BLOOD PRESSURE: 139 MMHG | OXYGEN SATURATION: 98 % | HEART RATE: 85 BPM | DIASTOLIC BLOOD PRESSURE: 66 MMHG

## 2024-10-18 DIAGNOSIS — M25.561 ACUTE PAIN OF RIGHT KNEE: Primary | ICD-10-CM

## 2024-10-18 PROCEDURE — 99283 EMERGENCY DEPT VISIT LOW MDM: CPT

## 2024-10-18 RX ORDER — OXYCODONE AND ACETAMINOPHEN 10; 325 MG/1; MG/1
1 TABLET ORAL EVERY 6 HOURS PRN
Qty: 8 TABLET | Refills: 0 | Status: SHIPPED | OUTPATIENT
Start: 2024-10-18

## 2024-10-18 NOTE — TELEPHONE ENCOUNTER
----- Message from Maryann sent at 10/18/2024 11:33 AM CDT -----  Contact: patient  Type:  Sooner Apoointment Request    Caller is requesting a sooner appointment.  Caller declined first available appointment listed below.  Caller will not accept being placed on the waitlist and is requesting a message be sent to doctor.  Name of Caller:Yeu Serrato   When is the first available appointment?N/a  Symptoms: Right knee fracture   Would the patient rather a call back or a response via [facility]?  Call   Best Call Back Number: [phone]   Additional Information:

## 2024-10-18 NOTE — TELEPHONE ENCOUNTER
Spoke with patient and informed her that [facility] is not accepting new Medicaid patient's at this time. Patient verbalized understanding.

## 2024-10-21 NOTE — ED PROVIDER NOTES
"Encounter Date: 10/18/2024       History     Chief Complaint   Patient presents with    Knee Pain     Pt. States she was seen at  last week and was told she has a knee cap FX. Pt has immobilizer on in triage. Pt states she can't get into see an ortho DRAngelo And was advised to come to ED.      Patient complains of knee pain told that she had a knee fracture in his unable to get in to see orthopedic is requesting some help with follow up.        Review of patient's allergies indicates:   Allergen Reactions    Morphine Other (See Comments)     Reports "headache"  Other reaction(s): HEADACHE     Past Medical History:   Diagnosis Date    Anxiety     Depression     Diabetes mellitus type II     Diverticulitis 2012    History of abnormal cervical Pap smear     HSV infection     Hyperlipidemia     Hypertension     Liver disease     fatty liver     Past Surgical History:   Procedure Laterality Date     SECTION      x3    COLONOSCOPY  2019    normal    CYSTOSCOPY WITH BIOPSY OF BLADDER N/A 2024    Procedure: CYSTOSCOPY, WITH BLADDER BIOPSY, WITH FULGURATION IF INDICATED;  Surgeon: Jovi Jackson MD;  Location: [facility];  Service: Urology;  Laterality: N/A;    ESOPHAGOGASTRODUODENOSCOPY N/A 2021    Procedure: EGD (ESOPHAGOGASTRODUODENOSCOPY);  Surgeon: Prerna Poole MD;  Location: [facility];  Service: Endoscopy;  Laterality: N/A;    ESOPHAGOGASTRODUODENOSCOPY N/A 2023    Procedure: EGD (ESOPHAGOGASTRODUODENOSCOPY);  Surgeon: Hector Reyes MD;  Location: [facility];  Service: Endoscopy;  Laterality: N/A;    LIPOMA RESECTION      upper back    TOTAL ABDOMINAL HYSTERECTOMY W/ BILATERAL SALPINGOOPHORECTOMY Bilateral 2005    dyplasia     Family History   Problem Relation Name Age of Onset    Heart disease Mother          CAD    Diabetes Mother      Hypertension Mother      Hyperlipidemia Mother      COPD Mother      COPD Father      Diabetes Father      Hyperlipidemia Father      " Hypertension Father      Heart disease Father          MI    Colon cancer Neg Hx       Social History     Tobacco Use    Smoking status: Never     Passive exposure: Never    Smokeless tobacco: Never   Substance Use Topics    Alcohol use: Yes     Comment: occasionally    Drug use: No     Review of Systems   Constitutional:  Negative for fever.   HENT:  Negative for sore throat.    Respiratory:  Negative for shortness of breath.    Cardiovascular:  Negative for chest pain.   Gastrointestinal:  Negative for nausea.   Genitourinary:  Negative for dysuria.   Musculoskeletal:  Negative for back pain.   Skin:  Negative for rash.   Neurological:  Negative for weakness.   Hematological:  Does not bruise/bleed easily.       Physical Exam     Initial Vitals [10/18/24 1337]   BP Pulse Resp Temp SpO2   (!) 141/68 85 18 99.5 °F (37.5 °C) 98 %      MAP       --         Physical Exam    Nursing note and vitals reviewed.  Constitutional: She appears well-developed and well-nourished. She is not diaphoretic. She is active.  Non-toxic appearance. No distress.   HENT:   Head: Normocephalic and atraumatic.   Eyes: Conjunctivae are normal. Right eye exhibits no discharge. Left eye exhibits no discharge. No scleral icterus.   Neck:   Normal range of motion.  Cardiovascular:  Normal rate, regular rhythm and intact distal pulses.           No murmur heard.  Pulmonary/Chest: Breath sounds normal. No respiratory distress. She has no wheezes.   Abdominal: She exhibits no distension.   Musculoskeletal:         General: No tenderness. Normal range of motion.      Cervical back: Normal range of motion.     Neurological: She is alert and oriented to person, place, and time. No cranial nerve deficit. GCS score is 15. GCS eye subscore is 4. GCS verbal subscore is 5. GCS motor subscore is 6.   Skin: Skin is warm and dry. Capillary refill takes less than 2 seconds. No rash noted.   Psychiatric: She has a normal mood and affect. Her behavior is normal.  Judgment and thought content normal.         ED Course   Procedures  Labs Reviewed - No data to display       Imaging Results    None          Medications - No data to display  Medical Decision Making  Differential diagnosis considered but not limited to; knee pain, knee injury    Risk  Prescription drug management.                                      Clinical Impression:  Final diagnoses:  [M25.561] Acute pain of right knee (Primary)          ED Disposition Condition    Discharge Stable          ED Prescriptions       Medication Sig Dispense Start Date End Date Auth. Provider    oxyCODONE-acetaminophen (PERCOCET)  mg per tablet Take 1 tablet by mouth every 6 (six) hours as needed for Pain. 8 tablet 10/18/2024 -- Jimi Hickey NP          Follow-up Information       Follow up With Specialties Details Why Contact Info    Surgery, [facility]- Ortho Orthopedic Surgery   [address] [facility] [address] [phone]      Care, [facility]    [address] [facility] [address] [phone]               Jimi Hickey NP  10/20/24 5560

## 2024-10-25 ENCOUNTER — TELEPHONE (OUTPATIENT)
Dept: ORTHOPEDICS | Facility: CLINIC | Age: 54
End: 2024-10-25
Payer: MEDICAID

## 2024-10-25 DIAGNOSIS — M25.561 RIGHT KNEE PAIN, UNSPECIFIED CHRONICITY: Primary | ICD-10-CM

## 2024-10-25 NOTE — TELEPHONE ENCOUNTER
----- Message from Landon Lockett PA-C sent at 10/25/2024  3:37 PM CDT -----  Next week with xrays  ----- Message -----  From: Melissa Montes MA  Sent: 10/24/2024   1:35 PM CDT  To: Melissa Montes MA; Landon Lockett PA-C; #    When should patient follow up in clinic?

## 2024-10-25 NOTE — TELEPHONE ENCOUNTER
Spoke with patient and was able to get her follow up appointment scheduled. Patient verbalized understanding of appointment date, time, and location.

## 2024-10-30 DIAGNOSIS — M25.562 PAIN IN BOTH KNEES, UNSPECIFIED CHRONICITY: Primary | ICD-10-CM

## 2024-10-30 DIAGNOSIS — M25.561 PAIN IN BOTH KNEES, UNSPECIFIED CHRONICITY: Primary | ICD-10-CM

## 2024-10-31 ENCOUNTER — OFFICE VISIT (OUTPATIENT)
Dept: ORTHOPEDICS | Facility: CLINIC | Age: 54
End: 2024-10-31
Payer: MEDICAID

## 2024-10-31 ENCOUNTER — TELEPHONE (OUTPATIENT)
Dept: ORTHOPEDICS | Facility: CLINIC | Age: 54
End: 2024-10-31

## 2024-10-31 ENCOUNTER — HOSPITAL ENCOUNTER (OUTPATIENT)
Dept: RADIOLOGY | Facility: HOSPITAL | Age: 54
Discharge: HOME | End: 2024-10-31
Attending: PHYSICIAN ASSISTANT
Payer: MEDICAID

## 2024-10-31 VITALS
BODY MASS INDEX: 27.81 KG/M2 | WEIGHT: 156.94 LBS | DIASTOLIC BLOOD PRESSURE: 74 MMHG | SYSTOLIC BLOOD PRESSURE: 136 MMHG | HEIGHT: 63 IN | HEART RATE: 88 BPM

## 2024-10-31 DIAGNOSIS — M25.562 PAIN IN BOTH KNEES, UNSPECIFIED CHRONICITY: ICD-10-CM

## 2024-10-31 DIAGNOSIS — M25.561 RIGHT KNEE PAIN, UNSPECIFIED CHRONICITY: Primary | ICD-10-CM

## 2024-10-31 DIAGNOSIS — S82.001A CLOSED NONDISPLACED FRACTURE OF RIGHT PATELLA, UNSPECIFIED FRACTURE MORPHOLOGY, INITIAL ENCOUNTER: Primary | ICD-10-CM

## 2024-10-31 DIAGNOSIS — M25.561 PAIN IN BOTH KNEES, UNSPECIFIED CHRONICITY: ICD-10-CM

## 2024-10-31 PROCEDURE — 3078F DIAST BP <80 MM HG: CPT | Mod: CPTII,,, | Performed by: PHYSICIAN ASSISTANT

## 2024-10-31 PROCEDURE — 1159F MED LIST DOCD IN RCRD: CPT | Mod: CPTII,,, | Performed by: PHYSICIAN ASSISTANT

## 2024-10-31 PROCEDURE — 73564 X-RAY EXAM KNEE 4 OR MORE: CPT | Mod: 26,50,, | Performed by: RADIOLOGY

## 2024-10-31 PROCEDURE — 99999 PR PBB SHADOW E&M-EST. PATIENT-LVL IV: CPT | Mod: PBBFAC,,, | Performed by: PHYSICIAN ASSISTANT

## 2024-10-31 PROCEDURE — 3075F SYST BP GE 130 - 139MM HG: CPT | Mod: CPTII,,, | Performed by: PHYSICIAN ASSISTANT

## 2024-10-31 PROCEDURE — 99214 OFFICE O/P EST MOD 30 MIN: CPT | Mod: PBBFAC,25 | Performed by: PHYSICIAN ASSISTANT

## 2024-10-31 PROCEDURE — 1160F RVW MEDS BY RX/DR IN RCRD: CPT | Mod: CPTII,,, | Performed by: PHYSICIAN ASSISTANT

## 2024-10-31 PROCEDURE — 99214 OFFICE O/P EST MOD 30 MIN: CPT | Mod: S$PBB,,, | Performed by: PHYSICIAN ASSISTANT

## 2024-10-31 PROCEDURE — 73564 X-RAY EXAM KNEE 4 OR MORE: CPT | Mod: TC,50

## 2024-10-31 PROCEDURE — 3008F BODY MASS INDEX DOCD: CPT | Mod: CPTII,,, | Performed by: PHYSICIAN ASSISTANT

## 2024-11-21 ENCOUNTER — OFFICE VISIT (OUTPATIENT)
Dept: ORTHOPEDICS | Facility: CLINIC | Age: 54
End: 2024-11-21
Payer: MEDICAID

## 2024-11-21 ENCOUNTER — HOSPITAL ENCOUNTER (OUTPATIENT)
Dept: RADIOLOGY | Facility: HOSPITAL | Age: 54
Discharge: HOME | End: 2024-11-21
Attending: PHYSICIAN ASSISTANT
Payer: MEDICAID

## 2024-11-21 VITALS
BODY MASS INDEX: 27.81 KG/M2 | HEIGHT: 63 IN | HEART RATE: 79 BPM | SYSTOLIC BLOOD PRESSURE: 141 MMHG | DIASTOLIC BLOOD PRESSURE: 83 MMHG | WEIGHT: 156.94 LBS

## 2024-11-21 DIAGNOSIS — M25.561 RIGHT KNEE PAIN, UNSPECIFIED CHRONICITY: ICD-10-CM

## 2024-11-21 DIAGNOSIS — S82.001D CLOSED NONDISPLACED FRACTURE OF RIGHT PATELLA WITH ROUTINE HEALING, UNSPECIFIED FRACTURE MORPHOLOGY, SUBSEQUENT ENCOUNTER: Primary | ICD-10-CM

## 2024-11-21 PROCEDURE — 73564 X-RAY EXAM KNEE 4 OR MORE: CPT | Mod: 26,RT,, | Performed by: STUDENT IN AN ORGANIZED HEALTH CARE EDUCATION/TRAINING PROGRAM

## 2024-11-21 PROCEDURE — 99214 OFFICE O/P EST MOD 30 MIN: CPT | Mod: S$PBB,,, | Performed by: PHYSICIAN ASSISTANT

## 2024-11-21 PROCEDURE — 1160F RVW MEDS BY RX/DR IN RCRD: CPT | Mod: CPTII,,, | Performed by: PHYSICIAN ASSISTANT

## 2024-11-21 PROCEDURE — 1159F MED LIST DOCD IN RCRD: CPT | Mod: CPTII,,, | Performed by: PHYSICIAN ASSISTANT

## 2024-11-21 PROCEDURE — 3077F SYST BP >= 140 MM HG: CPT | Mod: CPTII,,, | Performed by: PHYSICIAN ASSISTANT

## 2024-11-21 PROCEDURE — 3079F DIAST BP 80-89 MM HG: CPT | Mod: CPTII,,, | Performed by: PHYSICIAN ASSISTANT

## 2024-11-21 PROCEDURE — 3008F BODY MASS INDEX DOCD: CPT | Mod: CPTII,,, | Performed by: PHYSICIAN ASSISTANT

## 2024-11-21 PROCEDURE — 99215 OFFICE O/P EST HI 40 MIN: CPT | Mod: PBBFAC,25 | Performed by: PHYSICIAN ASSISTANT

## 2024-11-21 PROCEDURE — 99999 PR PBB SHADOW E&M-EST. PATIENT-LVL V: CPT | Mod: PBBFAC,,, | Performed by: PHYSICIAN ASSISTANT

## 2024-11-21 PROCEDURE — 73564 X-RAY EXAM KNEE 4 OR MORE: CPT | Mod: TC,RT

## 2024-11-21 RX ORDER — INSULIN LISPRO 100 [IU]/ML
INJECTION, SOLUTION INTRAVENOUS; SUBCUTANEOUS
COMMUNITY
Start: 2024-11-19

## 2024-11-21 NOTE — PROGRESS NOTES
"Subjective:      Patient ID: Yue Serrato is a 54 y.o. female.    History of Present Illness    CHIEF COMPLAINT:  - Yue presents today for follow-up of right patella fracture that occurred approximately six weeks ago due to a fall.    HPI:  Yue presents for follow-up of a right patella fracture that occurred approximately six weeks ago. The injury happened on October 12th when she fell while trying to  her puppy. She initially went to urgent care on October 14th and was seen in this department on October 31st. At that time, she was recommended to continue using the knee immobilizer that was placed at the urgent care and emergency department. She was advised that she could be weight-bearing in the knee immobilizer, using crutches if needed.    Yue reports improved condition, with mild residual pain. She has been removing the immobilizer to bend her knee and states she can achieve full flexion. Yue experiences stinging pains, particularly when fully extending or flexing the knee to a certain point. She describes the pain as "tight" and indicates it is tolerable and does not prevent movement.        Past Medical History:   Diagnosis Date    Anxiety     Depression     Diabetes mellitus type II     Diverticulitis 2012    History of abnormal cervical Pap smear     HSV infection     Hyperlipidemia     Hypertension     Liver disease     fatty liver     Current Outpatient Medications:     atorvastatin (LIPITOR) 40 MG tablet, Take 40 mg by mouth., Disp: , Rfl:     blood sugar diagnostic (ACCU-CHEK SMARTVIEW TEST STRIP) Strp, Check BS fastin and 2 hrs after biggest meal, Disp: 60 strip, Rfl: 0    clonazePAM (KLONOPIN) 0.5 MG tablet, Take 0.5 mg by mouth 2 (two) times daily as needed for Anxiety., Disp: , Rfl:     estradioL (VAGIFEM) 10 mcg Tab, Place 1 tablet (10 mcg total) vaginally twice a week., Disp: 8 tablet, Rfl: 11    fenofibrate 160 MG Tab, Take 160 mg by mouth once daily., Disp: , Rfl:     " fluticasone propionate (FLONASE) 50 mcg/actuation nasal spray, by Each Nostril route., Disp: , Rfl:     HUMALOG KWIKPEN INSULIN 100 unit/mL pen, Inject per sliding scale up to 36 u per day, Disp: , Rfl:     lancets (ACCU-CHEK SOFTCLIX LANCETS) Misc, Check BS fastin and 2 hrs after biggest meal, Disp: 60 each, Rfl: 0    ONETOUCH DELICA PLUS LANCET 33 gauge Misc, Apply topically., Disp: , Rfl:     ONETOUCH VERIO FLEX METER Misc, use as directed, Disp: , Rfl:     OZEMPIC 2 mg/dose (8 mg/3 mL) PnIj, , Disp: , Rfl:     pantoprazole (PROTONIX) 40 MG tablet, Take 1 tablet (40 mg total) by mouth once daily., Disp: 30 tablet, Rfl: 3    traMADoL (ULTRAM) 50 mg tablet, Take 1 tablet (50 mg total) by mouth every 6 (six) hours as needed for Pain., Disp: 20 tablet, Rfl: 0    TRESIBA FLEXTOUCH U-200 200 unit/mL (3 mL) InPn, INJECT 100 UNITS SUBCUTANEOUSLY ONCE DAILY, Disp: , Rfl: 5    TRINTELLIX 10 mg Tab, Take 1 tablet by mouth., Disp: , Rfl:     acetaminophen (TYLENOL) 500 MG tablet, Tylenol Take @0700 No date recorded No form recorded No frequency recorded No route recorded No set duration recorded No set duration amount recorded suspended No dosage strength recorded No dosage strength units of measure recorded (Patient not taking: Reported on 11/21/2024), Disp: , Rfl:     atorvastatin (LIPITOR) 10 MG tablet, Take 10 mg by mouth once daily. (Patient not taking: Reported on 11/21/2024), Disp: , Rfl:     fenofibrate (TRICOR) 48 MG tablet, 1 tablet Orally Once a day for 30 day(s) (Patient not taking: Reported on 11/21/2024), Disp: , Rfl:     nabumetone (RELAFEN) 750 MG tablet, Take 1 tablet (750 mg total) by mouth 2 (two) times daily. (Patient not taking: Reported on 11/21/2024), Disp: 60 tablet, Rfl: 1    vortioxetine (TRINTELLIX) 10 mg Tab, Take 1 tablet by mouth every day (Patient not taking: Reported on 11/21/2024), Disp: 30 tablet, Rfl: 1    Review of patient's allergies indicates:   Allergen Reactions    Morphine Other (See  "Comments)     Reports "headache"  Other reaction(s): HEADACHE       BP (!) 141/83   Pulse 79   Ht 5' 3" (1.6 m)   Wt 71.2 kg (156 lb 15.5 oz)   BMI 27.81 kg/m²       Objective:    Ortho Exam   Right knee:   Skin is intact   No edema   Moderate TTP over the medial inferior portion of the patella   Patient is able to move the knee without much pain, flexing it past 90°   Calf and compartments are soft and compressible   Motor exam normal   Sensation and pulses intact   Cap refill brisk    GEN: Well developed, well nourished female. AAOX3. No acute distress.   Head: Normocephalic, atraumatic.   Eyes: NABIL  Neck: Trachea is midline, no adenopathy  Resp: Breathing unlabored.  Neuro: Motor function normal, Cranial nerves intact  Psych: Mood and affect appropriate.    Assessment:     Imaging:  X-ray right knee obtained today was reviewed and shows previously noted cortical irregularity of the medial portion of the patella in similar alignment to previous films.  This likely represents nondisplaced fracture.      1. Closed nondisplaced fracture of right patella with routine healing, unspecified fracture morphology, subsequent encounter        Plan:     - Reviewed the radiographs with the patient and showed her the area of concern.    - Explained that there has been no change in alignment and she is moving the knee better, so she should continue to increase ROM as much as tolerated.  - Recommended using a hinged knee brace for ambulation.  - Referred to physical therapy for improving range of motion and strengthening quadriceps muscles.  - Return to clinic in about 6 weeks for repeat radiographs and further evaluation for us to assess the progress she has made with physical therapy.      Orders Placed This Encounter    Ambulatory referral/consult to Physical/Occupational Therapy     Follow up in about 6 weeks (around 1/2/2025).      Patient note was created using MModal Dictation.  Any errors in syntax or even " information may not have been identified and edited on initial review prior to signing this note.    This note was generated with the assistance of ambient listening technology. Verbal consent was obtained by the patient and accompanying visitor(s) for the recording of patient appointment to facilitate this note. I attest to having reviewed and edited the generated note for accuracy, though some syntax or spelling errors may persist. Please contact the author of this note for any clarification.

## 2025-01-02 ENCOUNTER — OFFICE VISIT (OUTPATIENT)
Dept: ORTHOPEDICS | Facility: CLINIC | Age: 55
End: 2025-01-02
Payer: MEDICAID

## 2025-01-02 ENCOUNTER — HOSPITAL ENCOUNTER (OUTPATIENT)
Dept: RADIOLOGY | Facility: HOSPITAL | Age: 55
Discharge: HOME | End: 2025-01-02
Attending: PHYSICIAN ASSISTANT
Payer: MEDICAID

## 2025-01-02 VITALS
DIASTOLIC BLOOD PRESSURE: 88 MMHG | WEIGHT: 156.94 LBS | HEART RATE: 81 BPM | SYSTOLIC BLOOD PRESSURE: 138 MMHG | HEIGHT: 62 IN | BODY MASS INDEX: 28.88 KG/M2

## 2025-01-02 DIAGNOSIS — S82.001D CLOSED NONDISPLACED FRACTURE OF RIGHT PATELLA WITH ROUTINE HEALING, UNSPECIFIED FRACTURE MORPHOLOGY, SUBSEQUENT ENCOUNTER: Primary | ICD-10-CM

## 2025-01-02 DIAGNOSIS — S82.001D CLOSED NONDISPLACED FRACTURE OF RIGHT PATELLA WITH ROUTINE HEALING, UNSPECIFIED FRACTURE MORPHOLOGY, SUBSEQUENT ENCOUNTER: ICD-10-CM

## 2025-01-02 PROCEDURE — 99214 OFFICE O/P EST MOD 30 MIN: CPT | Mod: PBBFAC,25 | Performed by: PHYSICIAN ASSISTANT

## 2025-01-02 PROCEDURE — 1160F RVW MEDS BY RX/DR IN RCRD: CPT | Mod: CPTII,,, | Performed by: PHYSICIAN ASSISTANT

## 2025-01-02 PROCEDURE — 99999 PR PBB SHADOW E&M-EST. PATIENT-LVL IV: CPT | Mod: PBBFAC,,, | Performed by: PHYSICIAN ASSISTANT

## 2025-01-02 PROCEDURE — 73562 X-RAY EXAM OF KNEE 3: CPT | Mod: TC,RT

## 2025-01-02 PROCEDURE — 99214 OFFICE O/P EST MOD 30 MIN: CPT | Mod: S$PBB,,, | Performed by: PHYSICIAN ASSISTANT

## 2025-01-02 PROCEDURE — 3079F DIAST BP 80-89 MM HG: CPT | Mod: CPTII,,, | Performed by: PHYSICIAN ASSISTANT

## 2025-01-02 PROCEDURE — 3075F SYST BP GE 130 - 139MM HG: CPT | Mod: CPTII,,, | Performed by: PHYSICIAN ASSISTANT

## 2025-01-02 PROCEDURE — 73560 X-RAY EXAM OF KNEE 1 OR 2: CPT | Mod: 26,59,LT, | Performed by: RADIOLOGY

## 2025-01-02 PROCEDURE — 73562 X-RAY EXAM OF KNEE 3: CPT | Mod: 26,RT,, | Performed by: RADIOLOGY

## 2025-01-02 PROCEDURE — 3008F BODY MASS INDEX DOCD: CPT | Mod: CPTII,,, | Performed by: PHYSICIAN ASSISTANT

## 2025-01-02 PROCEDURE — 1159F MED LIST DOCD IN RCRD: CPT | Mod: CPTII,,, | Performed by: PHYSICIAN ASSISTANT

## 2025-01-02 RX ORDER — ARIPIPRAZOLE 5 MG/1
5 TABLET ORAL DAILY
COMMUNITY
Start: 2024-12-23

## 2025-01-02 RX ORDER — VORTIOXETINE 20 MG/1
20 TABLET, FILM COATED ORAL DAILY
COMMUNITY
Start: 2024-11-04

## 2025-01-02 RX ORDER — PROMETHAZINE HYDROCHLORIDE AND DEXTROMETHORPHAN HYDROBROMIDE 6.25; 15 MG/5ML; MG/5ML
SYRUP ORAL
COMMUNITY
Start: 2024-11-04

## 2025-01-02 RX ORDER — TRAZODONE HYDROCHLORIDE 50 MG/1
50 TABLET ORAL NIGHTLY
COMMUNITY
Start: 2024-12-23

## 2025-01-02 RX ORDER — HYDROXYZINE HYDROCHLORIDE 50 MG/1
50 TABLET, FILM COATED ORAL NIGHTLY
COMMUNITY
Start: 2024-12-23

## 2025-01-02 NOTE — PROGRESS NOTES
Subjective:      Patient ID: Yue Serrato is a 54 y.o. female.    History of Present Illness    CHIEF COMPLAINT:  - Yue presents today for follow-up of right patella fracture sustained on October 12th, 2024.    HPI:  Yue presents for follow-up of a right patella fracture that occurred on October 12th, 2024, when she fell while trying to  her puppy. She was last seen in this clinic on November 21st, 2024, where she was encouraged to increase range of motion (ROM) as tolerated and use a hinged knee brace for ambulation. She was also started on therapy for ROM and quadriceps strengthening.    Her knee is improving and she states that she ran on it for the first time yesterday. When it gets cold, it bothers her slightly, but overall states that it feels good. She has been wearing a brace for several weeks. She denies any tenderness in the knee.    Unrelated, she reports having a herniated disc in her neck that affects her shoulder. She indicates pain in her shoulder area. She describes that sometimes the pain radiates down and extends into her shoulder blade. She mentions having had injections for this in the past but expresses that she does not want pain management. She has been doing exercises for her neck/shoulder issue.    She also mentions having diabetes that has been poorly controlled for years and is unsure if she should see a specialist for it.       Past Medical History:   Diagnosis Date    Anxiety     Depression     Diabetes mellitus type II     Diverticulitis 2012    History of abnormal cervical Pap smear     HSV infection     Hyperlipidemia     Hypertension     Liver disease     fatty liver     Current Outpatient Medications:     ARIPiprazole (ABILIFY) 5 MG Tab, Take 5 mg by mouth once daily., Disp: , Rfl:     atorvastatin (LIPITOR) 10 MG tablet, Take 10 mg by mouth once daily., Disp: , Rfl:     atorvastatin (LIPITOR) 40 MG tablet, Take 40 mg by mouth., Disp: , Rfl:     blood sugar  "diagnostic (ACCU-CHEK SMARTVIEW TEST STRIP) Strp, Check BS fastin and 2 hrs after biggest meal, Disp: 60 strip, Rfl: 0    clonazePAM (KLONOPIN) 0.5 MG tablet, Take 0.5 mg by mouth 2 (two) times daily as needed for Anxiety., Disp: , Rfl:     estradioL (VAGIFEM) 10 mcg Tab, Place 1 tablet (10 mcg total) vaginally twice a week., Disp: 8 tablet, Rfl: 11    fenofibrate 160 MG Tab, Take 160 mg by mouth once daily., Disp: , Rfl:     fluticasone propionate (FLONASE) 50 mcg/actuation nasal spray, by Each Nostril route., Disp: , Rfl:     HUMALOG KWIKPEN INSULIN 100 unit/mL pen, Inject per sliding scale up to 36 u per day, Disp: , Rfl:     hydrOXYzine (ATARAX) 50 MG tablet, Take 50 mg by mouth every evening., Disp: , Rfl:     lancets (ACCU-CHEK SOFTCLIX LANCETS) Misc, Check BS fastin and 2 hrs after biggest meal, Disp: 60 each, Rfl: 0    ONETOUCH DELICA PLUS LANCET 33 gauge Misc, Apply topically., Disp: , Rfl:     ONETOUCH VERIO FLEX METER Misc, use as directed, Disp: , Rfl:     OZEMPIC 2 mg/dose (8 mg/3 mL) PnIj, , Disp: , Rfl:     pantoprazole (PROTONIX) 40 MG tablet, Take 1 tablet (40 mg total) by mouth once daily., Disp: 30 tablet, Rfl: 3    promethazine-dextromethorphan (PROMETHAZINE-DM) 6.25-15 mg/5 mL Syrp, Take 5 mL as needed by oral route., Disp: , Rfl:     traZODone (DESYREL) 50 MG tablet, Take 50 mg by mouth every evening., Disp: , Rfl:     TRESIBA FLEXTOUCH U-200 200 unit/mL (3 mL) InPn, INJECT 100 UNITS SUBCUTANEOUSLY ONCE DAILY, Disp: , Rfl: 5    TRINTELLIX 20 mg Tab, Take 20 mg by mouth once daily., Disp: , Rfl:     Review of patient's allergies indicates:   Allergen Reactions    Morphine Other (See Comments)     Reports "headache"  Other reaction(s): HEADACHE       /88 (BP Location: Left arm, Patient Position: Sitting)   Pulse 81   Ht 5' 2" (1.575 m)   Wt 71.2 kg (156 lb 15.5 oz)   BMI 28.71 kg/m²       Objective:    Ortho Exam   Right knee:   Skin is intact   No edema   No TTP  ROM: Extension 0°, " flexion 120°   Calf and compartments are soft and compressible  Motor exam normal   Sensation and pulses intact   Cap refill brisk    GEN: Well developed, well nourished female. AAOX3. No acute distress.   Head: Normocephalic, atraumatic.   Eyes: NABIL  Neck: Trachea is midline, no adenopathy  Resp: Breathing unlabored.  Neuro: Motor function normal, Cranial nerves intact  Psych: Mood and affect appropriate.    Assessment:     Imaging:  X-ray right knee obtained today shows previously noted cortical irregularity of the medial portion of the patella in similar alignment to previous films with continued obscuring.  No detrimental changes.      1. Closed nondisplaced fracture of right patella with routine healing, unspecified fracture morphology, subsequent encounter        Plan:     - Reviewed X-ray of right knee, which shows interval healing at the previously noted medial patella fracture with no detrimental changes.  - Encouraged the patient to increase all activities as tolerated.  - For her neck/shoulder problems I offered her a referral to a neurosurgeon that takes her insurance, but she says that the pain is not significant enough at this time to warrant further treatment, but she will let me know in the future if she feels it is necessary.  - Return if knee causes any trouble or for any other orthopedic problems.      Follow up if symptoms worsen or fail to improve.      Patient note was created using MModal Dictation.  Any errors in syntax or even information may not have been identified and edited on initial review prior to signing this note.    This note was generated with the assistance of ambient listening technology. Verbal consent was obtained by the patient and accompanying visitor(s) for the recording of patient appointment to facilitate this note. I attest to having reviewed and edited the generated note for accuracy, though some syntax or spelling errors may persist. Please contact the author of this  note for any clarification.

## (undated) DEVICE — PAD UNDERPAD 30X30

## (undated) DEVICE — KIT TURNOVER

## (undated) DEVICE — SOL IRR NACL .9% 3000ML

## (undated) DEVICE — ELECTRODE REM PLYHSV RETURN 9

## (undated) DEVICE — MARKER SKIN RULER STERILE

## (undated) DEVICE — GLOVE SURG BIOGEL LATEX SZ 7.5

## (undated) DEVICE — SOL WATER STRL IRR 1000ML

## (undated) DEVICE — SET IRR URLGY 2LINE UNIV SPIKE

## (undated) DEVICE — SYR ONLY LUER LOCK 20CC

## (undated) DEVICE — TOWEL OR DISP STRL BLUE 4/PK

## (undated) DEVICE — UROVIEW 2600/2800

## (undated) DEVICE — SUPPORT ULNA NERVE PROTECTOR

## (undated) DEVICE — CONTAINER SPECIMEN OR STER 4OZ

## (undated) DEVICE — COVER CAMERA OPERATING ROOM

## (undated) DEVICE — SPONGE COTTON TRAY 4X4IN

## (undated) DEVICE — BOWL STERILE LARGE 32OZ

## (undated) DEVICE — GOWN POLY REINF BRTH SLV XL

## (undated) DEVICE — DRAPE T CYSTOSCOPY STERILE

## (undated) DEVICE — TRAY SKIN SCRUB WET PREMIUM

## (undated) DEVICE — COVER LIGHT HANDLE 80/CA